# Patient Record
Sex: MALE | Race: BLACK OR AFRICAN AMERICAN | NOT HISPANIC OR LATINO | Employment: FULL TIME | RURAL
[De-identification: names, ages, dates, MRNs, and addresses within clinical notes are randomized per-mention and may not be internally consistent; named-entity substitution may affect disease eponyms.]

---

## 2021-11-12 ENCOUNTER — ANESTHESIA EVENT (OUTPATIENT)
Dept: SURGERY | Facility: HOSPITAL | Age: 46
DRG: 500 | End: 2021-11-12
Payer: COMMERCIAL

## 2021-11-12 ENCOUNTER — ANESTHESIA (OUTPATIENT)
Dept: SURGERY | Facility: HOSPITAL | Age: 46
DRG: 500 | End: 2021-11-12
Payer: COMMERCIAL

## 2021-11-12 ENCOUNTER — HOSPITAL ENCOUNTER (INPATIENT)
Facility: HOSPITAL | Age: 46
LOS: 28 days | Discharge: HOME HEALTH | DRG: 500 | End: 2021-12-10
Attending: INTERNAL MEDICINE | Admitting: INTERNAL MEDICINE
Payer: COMMERCIAL

## 2021-11-12 VITALS — RESPIRATION RATE: 2 BRPM

## 2021-11-12 DIAGNOSIS — R00.0 TACHYCARDIA: ICD-10-CM

## 2021-11-12 DIAGNOSIS — L02.416 ABSCESS OF LEG, LEFT: ICD-10-CM

## 2021-11-12 DIAGNOSIS — E11.10 DKA (DIABETIC KETOACIDOSIS): ICD-10-CM

## 2021-11-12 DIAGNOSIS — L97.509 ULCER OF TOE, UNSPECIFIED LATERALITY, UNSPECIFIED ULCER STAGE: ICD-10-CM

## 2021-11-12 DIAGNOSIS — J96.02 ACUTE RESPIRATORY FAILURE WITH HYPOXIA AND HYPERCARBIA: ICD-10-CM

## 2021-11-12 DIAGNOSIS — E11.22 TYPE 2 DIABETES MELLITUS WITH STAGE 3B CHRONIC KIDNEY DISEASE, WITHOUT LONG-TERM CURRENT USE OF INSULIN: Chronic | ICD-10-CM

## 2021-11-12 DIAGNOSIS — J96.01 ACUTE RESPIRATORY FAILURE WITH HYPOXIA AND HYPERCARBIA: ICD-10-CM

## 2021-11-12 DIAGNOSIS — E87.20 METABOLIC ACIDOSIS: ICD-10-CM

## 2021-11-12 DIAGNOSIS — L02.214 ABSCESS OF GROIN, LEFT: ICD-10-CM

## 2021-11-12 DIAGNOSIS — N17.9 ACUTE RENAL FAILURE SUPERIMPOSED ON STAGE 3A CHRONIC KIDNEY DISEASE, UNSPECIFIED ACUTE RENAL FAILURE TYPE: ICD-10-CM

## 2021-11-12 DIAGNOSIS — F05 ACUTE CONFUSIONAL STATE: ICD-10-CM

## 2021-11-12 DIAGNOSIS — I96 GANGRENE OF FOOT: ICD-10-CM

## 2021-11-12 DIAGNOSIS — E11.10 DIABETIC KETOACIDOSIS: ICD-10-CM

## 2021-11-12 DIAGNOSIS — N18.32 TYPE 2 DIABETES MELLITUS WITH STAGE 3B CHRONIC KIDNEY DISEASE, WITHOUT LONG-TERM CURRENT USE OF INSULIN: Chronic | ICD-10-CM

## 2021-11-12 DIAGNOSIS — N18.31 ACUTE RENAL FAILURE SUPERIMPOSED ON STAGE 3A CHRONIC KIDNEY DISEASE, UNSPECIFIED ACUTE RENAL FAILURE TYPE: ICD-10-CM

## 2021-11-12 DIAGNOSIS — Z93.3 S/P COLOSTOMY: ICD-10-CM

## 2021-11-12 DIAGNOSIS — M72.6 NECROTIZING FASCIITIS: Primary | ICD-10-CM

## 2021-11-12 DIAGNOSIS — D64.9 ANEMIA, UNSPECIFIED TYPE: ICD-10-CM

## 2021-11-12 DIAGNOSIS — I10 BENIGN ESSENTIAL HTN: ICD-10-CM

## 2021-11-12 DIAGNOSIS — J96.01 ACUTE HYPOXEMIC RESPIRATORY FAILURE: ICD-10-CM

## 2021-11-12 DIAGNOSIS — E87.6 HYPOKALEMIA: ICD-10-CM

## 2021-11-12 DIAGNOSIS — G47.33 OSA (OBSTRUCTIVE SLEEP APNEA): ICD-10-CM

## 2021-11-12 LAB
ANION GAP SERPL CALCULATED.3IONS-SCNC: 27 MMOL/L (ref 7–16)
BUN SERPL-MCNC: 20 MG/DL (ref 7–18)
BUN/CREAT SERPL: 11 (ref 6–20)
CALCIUM SERPL-MCNC: 9.8 MG/DL (ref 8.5–10.1)
CHLORIDE SERPL-SCNC: 96 MMOL/L (ref 98–107)
CO2 SERPL-SCNC: 10 MMOL/L (ref 21–32)
CREAT SERPL-MCNC: 1.81 MG/DL (ref 0.7–1.3)
GLUCOSE SERPL-MCNC: 200 MG/DL (ref 70–105)
GLUCOSE SERPL-MCNC: 249 MG/DL (ref 70–105)
GLUCOSE SERPL-MCNC: 258 MG/DL (ref 70–105)
GLUCOSE SERPL-MCNC: 261 MG/DL (ref 70–105)
GLUCOSE SERPL-MCNC: 406 MG/DL (ref 70–105)
GLUCOSE SERPL-MCNC: 406 MG/DL (ref 74–106)
PHOSPHATE SERPL-MCNC: 3.1 MG/DL (ref 2.5–4.5)
POTASSIUM SERPL-SCNC: 4.3 MMOL/L (ref 3.5–5.1)
SODIUM SERPL-SCNC: 129 MMOL/L (ref 136–145)

## 2021-11-12 PROCEDURE — D9220A PRA ANESTHESIA: ICD-10-PCS | Mod: CRNA,,, | Performed by: NURSE ANESTHETIST, CERTIFIED REGISTERED

## 2021-11-12 PROCEDURE — 88305 TISSUE EXAM BY PATHOLOGIST: CPT | Mod: SUR | Performed by: STUDENT IN AN ORGANIZED HEALTH CARE EDUCATION/TRAINING PROGRAM

## 2021-11-12 PROCEDURE — 88305 TISSUE EXAM BY PATHOLOGIST: CPT | Mod: 26,XU,, | Performed by: PATHOLOGY

## 2021-11-12 PROCEDURE — 97597 PR DEBRIDEMENT OPEN WOUND 20 SQ CM<: ICD-10-PCS | Mod: XU,,, | Performed by: STUDENT IN AN ORGANIZED HEALTH CARE EDUCATION/TRAINING PROGRAM

## 2021-11-12 PROCEDURE — 20000000 HC ICU ROOM

## 2021-11-12 PROCEDURE — 97597 DBRDMT OPN WND 1ST 20 CM/<: CPT | Mod: XU,,, | Performed by: STUDENT IN AN ORGANIZED HEALTH CARE EDUCATION/TRAINING PROGRAM

## 2021-11-12 PROCEDURE — 10140 I&D HMTMA SEROMA/FLUID COLLJ: CPT | Mod: 51,,, | Performed by: STUDENT IN AN ORGANIZED HEALTH CARE EDUCATION/TRAINING PROGRAM

## 2021-11-12 PROCEDURE — 87040 BLOOD CULTURE FOR BACTERIA: CPT | Performed by: INTERNAL MEDICINE

## 2021-11-12 PROCEDURE — 88311 DECALCIFY TISSUE: CPT | Mod: 26,,, | Performed by: PATHOLOGY

## 2021-11-12 PROCEDURE — 63600175 PHARM REV CODE 636 W HCPCS: Performed by: NURSE ANESTHETIST, CERTIFIED REGISTERED

## 2021-11-12 PROCEDURE — 10140 PR DRAINAGE OF HEMATOMA/FLUID: ICD-10-PCS | Mod: 51,,, | Performed by: STUDENT IN AN ORGANIZED HEALTH CARE EDUCATION/TRAINING PROGRAM

## 2021-11-12 PROCEDURE — D9220A PRA ANESTHESIA: Mod: CRNA,,, | Performed by: NURSE ANESTHETIST, CERTIFIED REGISTERED

## 2021-11-12 PROCEDURE — 27000716 HC OXISENSOR PROBE, ANY SIZE: Performed by: ANESTHESIOLOGY

## 2021-11-12 PROCEDURE — 25000003 PHARM REV CODE 250: Performed by: STUDENT IN AN ORGANIZED HEALTH CARE EDUCATION/TRAINING PROGRAM

## 2021-11-12 PROCEDURE — 36000706: Performed by: STUDENT IN AN ORGANIZED HEALTH CARE EDUCATION/TRAINING PROGRAM

## 2021-11-12 PROCEDURE — 87075 CULTR BACTERIA EXCEPT BLOOD: CPT | Performed by: STUDENT IN AN ORGANIZED HEALTH CARE EDUCATION/TRAINING PROGRAM

## 2021-11-12 PROCEDURE — 80048 BASIC METABOLIC PNL TOTAL CA: CPT | Performed by: INTERNAL MEDICINE

## 2021-11-12 PROCEDURE — 63600175 PHARM REV CODE 636 W HCPCS: Performed by: INTERNAL MEDICINE

## 2021-11-12 PROCEDURE — 82962 GLUCOSE BLOOD TEST: CPT

## 2021-11-12 PROCEDURE — 87070 CULTURE OTHR SPECIMN AEROBIC: CPT | Performed by: STUDENT IN AN ORGANIZED HEALTH CARE EDUCATION/TRAINING PROGRAM

## 2021-11-12 PROCEDURE — 27000689 HC BLADE LARYNGOSCOPE ANY SIZE: Performed by: ANESTHESIOLOGY

## 2021-11-12 PROCEDURE — 99223 1ST HOSP IP/OBS HIGH 75: CPT | Mod: ,,, | Performed by: INTERNAL MEDICINE

## 2021-11-12 PROCEDURE — 93010 EKG 12-LEAD: ICD-10-PCS | Mod: ,,, | Performed by: INTERNAL MEDICINE

## 2021-11-12 PROCEDURE — P9045 ALBUMIN (HUMAN), 5%, 250 ML: HCPCS | Performed by: NURSE ANESTHETIST, CERTIFIED REGISTERED

## 2021-11-12 PROCEDURE — 11006 DBRDMT SKIN XTRNL GENT PER: CPT | Mod: ,,, | Performed by: STUDENT IN AN ORGANIZED HEALTH CARE EDUCATION/TRAINING PROGRAM

## 2021-11-12 PROCEDURE — 37000008 HC ANESTHESIA 1ST 15 MINUTES: Performed by: STUDENT IN AN ORGANIZED HEALTH CARE EDUCATION/TRAINING PROGRAM

## 2021-11-12 PROCEDURE — 25000003 PHARM REV CODE 250: Performed by: INTERNAL MEDICINE

## 2021-11-12 PROCEDURE — 27201423 OPTIME MED/SURG SUP & DEVICES STERILE SUPPLY: Performed by: STUDENT IN AN ORGANIZED HEALTH CARE EDUCATION/TRAINING PROGRAM

## 2021-11-12 PROCEDURE — 28820 AMPUTATION OF TOE: CPT | Mod: T1,51,, | Performed by: STUDENT IN AN ORGANIZED HEALTH CARE EDUCATION/TRAINING PROGRAM

## 2021-11-12 PROCEDURE — 27000510 HC BLANKET BAIR HUGGER ANY SIZE: Performed by: ANESTHESIOLOGY

## 2021-11-12 PROCEDURE — 27000165 HC TUBE, ETT CUFFED: Performed by: ANESTHESIOLOGY

## 2021-11-12 PROCEDURE — 36415 COLL VENOUS BLD VENIPUNCTURE: CPT | Performed by: INTERNAL MEDICINE

## 2021-11-12 PROCEDURE — 37000009 HC ANESTHESIA EA ADD 15 MINS: Performed by: STUDENT IN AN ORGANIZED HEALTH CARE EDUCATION/TRAINING PROGRAM

## 2021-11-12 PROCEDURE — 84100 ASSAY OF PHOSPHORUS: CPT | Performed by: INTERNAL MEDICINE

## 2021-11-12 PROCEDURE — 93010 ELECTROCARDIOGRAM REPORT: CPT | Mod: ,,, | Performed by: INTERNAL MEDICINE

## 2021-11-12 PROCEDURE — 88305 TISSUE EXAM BY PATHOLOGIST: CPT | Mod: 26,,, | Performed by: PATHOLOGY

## 2021-11-12 PROCEDURE — D9220A PRA ANESTHESIA: ICD-10-PCS | Mod: ANES,,, | Performed by: ANESTHESIOLOGY

## 2021-11-12 PROCEDURE — 88305 TISSUE EXAM BY PATHOLOGIST: ICD-10-PCS | Mod: 26,XU,, | Performed by: PATHOLOGY

## 2021-11-12 PROCEDURE — 80048 BASIC METABOLIC PNL TOTAL CA: CPT | Performed by: STUDENT IN AN ORGANIZED HEALTH CARE EDUCATION/TRAINING PROGRAM

## 2021-11-12 PROCEDURE — 25000003 PHARM REV CODE 250: Performed by: NURSE ANESTHETIST, CERTIFIED REGISTERED

## 2021-11-12 PROCEDURE — 28820 PR AMPUTATION TOE,MT-P JT: ICD-10-PCS | Mod: T1,51,, | Performed by: STUDENT IN AN ORGANIZED HEALTH CARE EDUCATION/TRAINING PROGRAM

## 2021-11-12 PROCEDURE — 93005 ELECTROCARDIOGRAM TRACING: CPT

## 2021-11-12 PROCEDURE — 84100 ASSAY OF PHOSPHORUS: CPT | Performed by: STUDENT IN AN ORGANIZED HEALTH CARE EDUCATION/TRAINING PROGRAM

## 2021-11-12 PROCEDURE — D9220A PRA ANESTHESIA: Mod: ANES,,, | Performed by: ANESTHESIOLOGY

## 2021-11-12 PROCEDURE — 88311 SURGICAL PATHOLOGY: ICD-10-PCS | Mod: 26,,, | Performed by: PATHOLOGY

## 2021-11-12 PROCEDURE — 99223 PR INITIAL HOSPITAL CARE,LEVL III: ICD-10-PCS | Mod: ,,, | Performed by: INTERNAL MEDICINE

## 2021-11-12 PROCEDURE — 36000707: Performed by: STUDENT IN AN ORGANIZED HEALTH CARE EDUCATION/TRAINING PROGRAM

## 2021-11-12 PROCEDURE — 63600175 PHARM REV CODE 636 W HCPCS: Performed by: STUDENT IN AN ORGANIZED HEALTH CARE EDUCATION/TRAINING PROGRAM

## 2021-11-12 PROCEDURE — 27000260 *HC AIRWAY ORAL: Performed by: ANESTHESIOLOGY

## 2021-11-12 PROCEDURE — 11006 PR DEBRIDE NECROT SKIN/ TISS, GENIT,PERI, ADB WALL: ICD-10-PCS | Mod: ,,, | Performed by: STUDENT IN AN ORGANIZED HEALTH CARE EDUCATION/TRAINING PROGRAM

## 2021-11-12 RX ORDER — SODIUM CHLORIDE 9 MG/ML
INJECTION, SOLUTION INTRAVENOUS CONTINUOUS
Status: CANCELLED | OUTPATIENT
Start: 2021-11-12

## 2021-11-12 RX ORDER — PROPOFOL 10 MG/ML
VIAL (ML) INTRAVENOUS
Status: DISCONTINUED | OUTPATIENT
Start: 2021-11-12 | End: 2021-11-12

## 2021-11-12 RX ORDER — HYDROMORPHONE HYDROCHLORIDE 2 MG/ML
1 INJECTION, SOLUTION INTRAMUSCULAR; INTRAVENOUS; SUBCUTANEOUS
Status: DISCONTINUED | OUTPATIENT
Start: 2021-11-12 | End: 2021-11-15

## 2021-11-12 RX ORDER — ENOXAPARIN SODIUM 100 MG/ML
40 INJECTION SUBCUTANEOUS EVERY 24 HOURS
Status: DISCONTINUED | OUTPATIENT
Start: 2021-11-12 | End: 2021-11-17 | Stop reason: SDUPTHER

## 2021-11-12 RX ORDER — FENTANYL CITRATE 50 UG/ML
INJECTION, SOLUTION INTRAMUSCULAR; INTRAVENOUS
Status: DISCONTINUED | OUTPATIENT
Start: 2021-11-12 | End: 2021-11-12

## 2021-11-12 RX ORDER — SODIUM CHLORIDE 9 MG/ML
INJECTION, SOLUTION INTRAVENOUS CONTINUOUS
Status: DISCONTINUED | OUTPATIENT
Start: 2021-11-12 | End: 2021-11-15

## 2021-11-12 RX ORDER — OXYCODONE AND ACETAMINOPHEN 10; 325 MG/1; MG/1
1 TABLET ORAL EVERY 6 HOURS PRN
Status: DISCONTINUED | OUTPATIENT
Start: 2021-11-12 | End: 2021-11-15

## 2021-11-12 RX ORDER — ALBUMIN HUMAN 50 G/1000ML
SOLUTION INTRAVENOUS CONTINUOUS PRN
Status: DISCONTINUED | OUTPATIENT
Start: 2021-11-12 | End: 2021-11-12

## 2021-11-12 RX ORDER — SODIUM CHLORIDE 0.9 % (FLUSH) 0.9 %
10 SYRINGE (ML) INJECTION
Status: DISCONTINUED | OUTPATIENT
Start: 2021-11-12 | End: 2021-12-10 | Stop reason: HOSPADM

## 2021-11-12 RX ORDER — ROCURONIUM BROMIDE 10 MG/ML
INJECTION, SOLUTION INTRAVENOUS
Status: DISCONTINUED | OUTPATIENT
Start: 2021-11-12 | End: 2021-11-12

## 2021-11-12 RX ORDER — LIDOCAINE HYDROCHLORIDE 20 MG/ML
INJECTION, SOLUTION EPIDURAL; INFILTRATION; INTRACAUDAL; PERINEURAL
Status: DISCONTINUED | OUTPATIENT
Start: 2021-11-12 | End: 2021-11-12

## 2021-11-12 RX ORDER — ONDANSETRON 2 MG/ML
INJECTION INTRAMUSCULAR; INTRAVENOUS
Status: DISCONTINUED | OUTPATIENT
Start: 2021-11-12 | End: 2021-11-12

## 2021-11-12 RX ORDER — MIDAZOLAM HYDROCHLORIDE 1 MG/ML
INJECTION INTRAMUSCULAR; INTRAVENOUS
Status: DISCONTINUED | OUTPATIENT
Start: 2021-11-12 | End: 2021-11-12

## 2021-11-12 RX ORDER — CLINDAMYCIN PHOSPHATE 900 MG/50ML
900 INJECTION, SOLUTION INTRAVENOUS
Status: DISCONTINUED | OUTPATIENT
Start: 2021-11-12 | End: 2021-11-22

## 2021-11-12 RX ADMIN — ALBUMIN (HUMAN): 12.5 INJECTION, SOLUTION INTRAVENOUS at 07:11

## 2021-11-12 RX ADMIN — FENTANYL CITRATE 25 MCG: 50 INJECTION INTRAMUSCULAR; INTRAVENOUS at 07:11

## 2021-11-12 RX ADMIN — VANCOMYCIN HYDROCHLORIDE 2250 MG: 5 INJECTION, POWDER, LYOPHILIZED, FOR SOLUTION INTRAVENOUS at 09:11

## 2021-11-12 RX ADMIN — ROCURONIUM BROMIDE 20 MG: 10 INJECTION, SOLUTION INTRAVENOUS at 06:11

## 2021-11-12 RX ADMIN — SODIUM CHLORIDE: 9 INJECTION, SOLUTION INTRAVENOUS at 11:11

## 2021-11-12 RX ADMIN — HUMAN INSULIN 20 UNITS: 100 INJECTION, SOLUTION SUBCUTANEOUS at 05:11

## 2021-11-12 RX ADMIN — MIDAZOLAM HYDROCHLORIDE 2 MG: 1 INJECTION, SOLUTION INTRAMUSCULAR; INTRAVENOUS at 05:11

## 2021-11-12 RX ADMIN — FENTANYL CITRATE 25 MCG: 50 INJECTION INTRAMUSCULAR; INTRAVENOUS at 06:11

## 2021-11-12 RX ADMIN — LIDOCAINE HYDROCHLORIDE 80 MG: 20 INJECTION, SOLUTION INTRAVENOUS at 05:11

## 2021-11-12 RX ADMIN — FENTANYL CITRATE 50 MCG: 50 INJECTION INTRAMUSCULAR; INTRAVENOUS at 05:11

## 2021-11-12 RX ADMIN — SODIUM CHLORIDE: 9 INJECTION, SOLUTION INTRAVENOUS at 06:11

## 2021-11-12 RX ADMIN — SODIUM CHLORIDE: 9 INJECTION, SOLUTION INTRAVENOUS at 05:11

## 2021-11-12 RX ADMIN — SODIUM CHLORIDE 3 UNITS/HR: 9 INJECTION, SOLUTION INTRAVENOUS at 08:11

## 2021-11-12 RX ADMIN — HYDROMORPHONE HYDROCHLORIDE 1 MG: 2 INJECTION INTRAMUSCULAR; INTRAVENOUS; SUBCUTANEOUS at 09:11

## 2021-11-12 RX ADMIN — PIPERACILLIN AND TAZOBACTAM 4.5 G: 4; .5 INJECTION, POWDER, FOR SOLUTION INTRAVENOUS at 06:11

## 2021-11-12 RX ADMIN — PROPOFOL 170 MG: 10 INJECTION, EMULSION INTRAVENOUS at 05:11

## 2021-11-12 RX ADMIN — ROCURONIUM BROMIDE 50 MG: 10 INJECTION, SOLUTION INTRAVENOUS at 05:11

## 2021-11-12 RX ADMIN — CLINDAMYCIN IN 5 PERCENT DEXTROSE 900 MG: 18 INJECTION, SOLUTION INTRAVENOUS at 11:11

## 2021-11-12 RX ADMIN — FENTANYL CITRATE 50 MCG: 50 INJECTION INTRAMUSCULAR; INTRAVENOUS at 06:11

## 2021-11-12 RX ADMIN — SUGAMMADEX 200 MG: 100 INJECTION, SOLUTION INTRAVENOUS at 07:11

## 2021-11-12 RX ADMIN — ONDANSETRON 8 MG: 2 INJECTION INTRAMUSCULAR; INTRAVENOUS at 05:11

## 2021-11-12 RX ADMIN — HUMAN INSULIN 20 UNITS: 100 INJECTION, SOLUTION SUBCUTANEOUS at 06:11

## 2021-11-13 PROBLEM — M72.6 NECROTIZING FASCIITIS: Status: ACTIVE | Noted: 2021-11-12

## 2021-11-13 LAB
ANION GAP SERPL CALCULATED.3IONS-SCNC: 13 MMOL/L (ref 7–16)
ANION GAP SERPL CALCULATED.3IONS-SCNC: 18 MMOL/L (ref 7–16)
ANION GAP SERPL CALCULATED.3IONS-SCNC: 18 MMOL/L (ref 7–16)
ANION GAP SERPL CALCULATED.3IONS-SCNC: 20 MMOL/L (ref 7–16)
ANION GAP SERPL CALCULATED.3IONS-SCNC: 21 MMOL/L (ref 7–16)
ANION GAP SERPL CALCULATED.3IONS-SCNC: 23 MMOL/L (ref 7–16)
BUN SERPL-MCNC: 21 MG/DL (ref 7–18)
BUN SERPL-MCNC: 22 MG/DL (ref 7–18)
BUN SERPL-MCNC: 23 MG/DL (ref 7–18)
BUN SERPL-MCNC: 24 MG/DL (ref 7–18)
BUN SERPL-MCNC: 25 MG/DL (ref 7–18)
BUN SERPL-MCNC: <1 MG/DL (ref 7–18)
BUN/CREAT SERPL: 10 (ref 6–20)
BUN/CREAT SERPL: 10 (ref 6–20)
BUN/CREAT SERPL: 7 (ref 6–20)
BUN/CREAT SERPL: 8 (ref 6–20)
BUN/CREAT SERPL: 8 (ref 6–20)
BUN/CREAT SERPL: <0 (ref 6–20)
CALCIUM SERPL-MCNC: 8.2 MG/DL (ref 8.5–10.1)
CALCIUM SERPL-MCNC: 8.3 MG/DL (ref 8.5–10.1)
CALCIUM SERPL-MCNC: 8.4 MG/DL (ref 8.5–10.1)
CALCIUM SERPL-MCNC: 8.5 MG/DL (ref 8.5–10.1)
CALCIUM SERPL-MCNC: 8.6 MG/DL (ref 8.5–10.1)
CALCIUM SERPL-MCNC: 8.8 MG/DL (ref 8.5–10.1)
CHLORIDE SERPL-SCNC: 100 MMOL/L (ref 98–107)
CHLORIDE SERPL-SCNC: 100 MMOL/L (ref 98–107)
CHLORIDE SERPL-SCNC: 101 MMOL/L (ref 98–107)
CHLORIDE SERPL-SCNC: 96 MMOL/L (ref 98–107)
CHLORIDE SERPL-SCNC: 99 MMOL/L (ref 98–107)
CHLORIDE SERPL-SCNC: 99 MMOL/L (ref 98–107)
CO2 SERPL-SCNC: 12 MMOL/L (ref 21–32)
CO2 SERPL-SCNC: 13 MMOL/L (ref 21–32)
CO2 SERPL-SCNC: 14 MMOL/L (ref 21–32)
CO2 SERPL-SCNC: 16 MMOL/L (ref 21–32)
CO2 SERPL-SCNC: 16 MMOL/L (ref 21–32)
CO2 SERPL-SCNC: 19 MMOL/L (ref 21–32)
CREAT SERPL-MCNC: 2.07 MG/DL (ref 0.7–1.3)
CREAT SERPL-MCNC: 2.33 MG/DL (ref 0.7–1.3)
CREAT SERPL-MCNC: 2.6 MG/DL (ref 0.7–1.3)
CREAT SERPL-MCNC: 2.95 MG/DL (ref 0.7–1.3)
CREAT SERPL-MCNC: 3.24 MG/DL (ref 0.7–1.3)
CREAT SERPL-MCNC: 3.5 MG/DL (ref 0.7–1.3)
GLUCOSE SERPL-MCNC: 201 MG/DL (ref 70–105)
GLUCOSE SERPL-MCNC: 205 MG/DL (ref 70–105)
GLUCOSE SERPL-MCNC: 214 MG/DL (ref 70–105)
GLUCOSE SERPL-MCNC: 217 MG/DL (ref 74–106)
GLUCOSE SERPL-MCNC: 226 MG/DL (ref 70–105)
GLUCOSE SERPL-MCNC: 230 MG/DL (ref 70–105)
GLUCOSE SERPL-MCNC: 231 MG/DL (ref 74–106)
GLUCOSE SERPL-MCNC: 232 MG/DL (ref 74–106)
GLUCOSE SERPL-MCNC: 235 MG/DL (ref 70–105)
GLUCOSE SERPL-MCNC: 238 MG/DL (ref 70–105)
GLUCOSE SERPL-MCNC: 238 MG/DL (ref 70–105)
GLUCOSE SERPL-MCNC: 240 MG/DL (ref 70–105)
GLUCOSE SERPL-MCNC: 241 MG/DL (ref 70–105)
GLUCOSE SERPL-MCNC: 242 MG/DL (ref 70–105)
GLUCOSE SERPL-MCNC: 247 MG/DL (ref 70–105)
GLUCOSE SERPL-MCNC: 250 MG/DL (ref 70–105)
GLUCOSE SERPL-MCNC: 255 MG/DL (ref 74–106)
GLUCOSE SERPL-MCNC: 264 MG/DL (ref 70–105)
GLUCOSE SERPL-MCNC: 264 MG/DL (ref 70–105)
GLUCOSE SERPL-MCNC: 265 MG/DL (ref 70–105)
GLUCOSE SERPL-MCNC: 265 MG/DL (ref 74–106)
GLUCOSE SERPL-MCNC: 266 MG/DL (ref 74–106)
GLUCOSE SERPL-MCNC: 268 MG/DL (ref 70–105)
GLUCOSE SERPL-MCNC: 275 MG/DL (ref 70–105)
GLUCOSE SERPL-MCNC: 282 MG/DL (ref 70–105)
GLUCOSE SERPL-MCNC: 299 MG/DL (ref 70–105)
PHOSPHATE SERPL-MCNC: 1.9 MG/DL (ref 2.5–4.5)
PHOSPHATE SERPL-MCNC: 2 MG/DL (ref 2.5–4.5)
PHOSPHATE SERPL-MCNC: 2 MG/DL (ref 2.5–4.5)
PHOSPHATE SERPL-MCNC: 2.1 MG/DL (ref 2.5–4.5)
PHOSPHATE SERPL-MCNC: 2.3 MG/DL (ref 2.5–4.5)
PHOSPHATE SERPL-MCNC: 2.4 MG/DL (ref 2.5–4.5)
POTASSIUM SERPL-SCNC: 3.2 MMOL/L (ref 3.5–5.1)
POTASSIUM SERPL-SCNC: 3.4 MMOL/L (ref 3.5–5.1)
POTASSIUM SERPL-SCNC: 3.6 MMOL/L (ref 3.5–5.1)
POTASSIUM SERPL-SCNC: 3.7 MMOL/L (ref 3.5–5.1)
POTASSIUM SERPL-SCNC: 3.8 MMOL/L (ref 3.5–5.1)
POTASSIUM SERPL-SCNC: 4.3 MMOL/L (ref 3.5–5.1)
SODIUM SERPL-SCNC: 125 MMOL/L (ref 136–145)
SODIUM SERPL-SCNC: 130 MMOL/L (ref 136–145)
SODIUM SERPL-SCNC: 131 MMOL/L (ref 136–145)

## 2021-11-13 PROCEDURE — 82962 GLUCOSE BLOOD TEST: CPT

## 2021-11-13 PROCEDURE — 25000003 PHARM REV CODE 250: Performed by: STUDENT IN AN ORGANIZED HEALTH CARE EDUCATION/TRAINING PROGRAM

## 2021-11-13 PROCEDURE — 99233 PR SUBSEQUENT HOSPITAL CARE,LEVL III: ICD-10-PCS | Mod: ,,, | Performed by: INTERNAL MEDICINE

## 2021-11-13 PROCEDURE — 80048 BASIC METABOLIC PNL TOTAL CA: CPT | Performed by: STUDENT IN AN ORGANIZED HEALTH CARE EDUCATION/TRAINING PROGRAM

## 2021-11-13 PROCEDURE — 36415 COLL VENOUS BLD VENIPUNCTURE: CPT | Performed by: STUDENT IN AN ORGANIZED HEALTH CARE EDUCATION/TRAINING PROGRAM

## 2021-11-13 PROCEDURE — 63600175 PHARM REV CODE 636 W HCPCS: Performed by: INTERNAL MEDICINE

## 2021-11-13 PROCEDURE — 99233 SBSQ HOSP IP/OBS HIGH 50: CPT | Mod: ,,, | Performed by: INTERNAL MEDICINE

## 2021-11-13 PROCEDURE — 84100 ASSAY OF PHOSPHORUS: CPT | Performed by: STUDENT IN AN ORGANIZED HEALTH CARE EDUCATION/TRAINING PROGRAM

## 2021-11-13 PROCEDURE — 63600175 PHARM REV CODE 636 W HCPCS: Performed by: STUDENT IN AN ORGANIZED HEALTH CARE EDUCATION/TRAINING PROGRAM

## 2021-11-13 PROCEDURE — 20000000 HC ICU ROOM

## 2021-11-13 RX ORDER — POTASSIUM CHLORIDE 7.45 MG/ML
40 INJECTION INTRAVENOUS ONCE
Status: COMPLETED | OUTPATIENT
Start: 2021-11-13 | End: 2021-11-14

## 2021-11-13 RX ORDER — ONDANSETRON 2 MG/ML
4 INJECTION INTRAMUSCULAR; INTRAVENOUS EVERY 6 HOURS PRN
Status: DISCONTINUED | OUTPATIENT
Start: 2021-11-13 | End: 2021-11-17

## 2021-11-13 RX ORDER — PROCHLORPERAZINE EDISYLATE 5 MG/ML
5 INJECTION INTRAMUSCULAR; INTRAVENOUS EVERY 6 HOURS PRN
Status: DISCONTINUED | OUTPATIENT
Start: 2021-11-13 | End: 2021-12-10 | Stop reason: HOSPADM

## 2021-11-13 RX ADMIN — ENOXAPARIN SODIUM 40 MG: 40 INJECTION SUBCUTANEOUS at 05:11

## 2021-11-13 RX ADMIN — PIPERACILLIN AND TAZOBACTAM 4.5 G: 4; .5 INJECTION, POWDER, FOR SOLUTION INTRAVENOUS at 05:11

## 2021-11-13 RX ADMIN — ONDANSETRON 4 MG: 2 INJECTION INTRAMUSCULAR; INTRAVENOUS at 09:11

## 2021-11-13 RX ADMIN — POTASSIUM CHLORIDE 40 MEQ: 7.46 INJECTION, SOLUTION INTRAVENOUS at 08:11

## 2021-11-13 RX ADMIN — SODIUM CHLORIDE: 9 INJECTION, SOLUTION INTRAVENOUS at 06:11

## 2021-11-13 RX ADMIN — HYDROMORPHONE HYDROCHLORIDE 1 MG: 2 INJECTION INTRAMUSCULAR; INTRAVENOUS; SUBCUTANEOUS at 01:11

## 2021-11-13 RX ADMIN — PIPERACILLIN AND TAZOBACTAM 4.5 G: 4; .5 INJECTION, POWDER, FOR SOLUTION INTRAVENOUS at 09:11

## 2021-11-13 RX ADMIN — HYDROMORPHONE HYDROCHLORIDE 1 MG: 2 INJECTION INTRAMUSCULAR; INTRAVENOUS; SUBCUTANEOUS at 09:11

## 2021-11-13 RX ADMIN — PIPERACILLIN AND TAZOBACTAM 4.5 G: 4; .5 INJECTION, POWDER, FOR SOLUTION INTRAVENOUS at 02:11

## 2021-11-13 RX ADMIN — OXYCODONE HYDROCHLORIDE AND ACETAMINOPHEN 1 TABLET: 10; 325 TABLET ORAL at 03:11

## 2021-11-13 RX ADMIN — HYDROMORPHONE HYDROCHLORIDE 1 MG: 2 INJECTION INTRAMUSCULAR; INTRAVENOUS; SUBCUTANEOUS at 03:11

## 2021-11-13 RX ADMIN — CLINDAMYCIN IN 5 PERCENT DEXTROSE 900 MG: 18 INJECTION, SOLUTION INTRAVENOUS at 06:11

## 2021-11-13 RX ADMIN — HYDROMORPHONE HYDROCHLORIDE 1 MG: 2 INJECTION INTRAMUSCULAR; INTRAVENOUS; SUBCUTANEOUS at 07:11

## 2021-11-13 RX ADMIN — SODIUM CHLORIDE 3.95 UNITS/HR: 9 INJECTION, SOLUTION INTRAVENOUS at 01:11

## 2021-11-13 RX ADMIN — SODIUM CHLORIDE: 9 INJECTION, SOLUTION INTRAVENOUS at 01:11

## 2021-11-13 RX ADMIN — SODIUM CHLORIDE: 9 INJECTION, SOLUTION INTRAVENOUS at 08:11

## 2021-11-13 RX ADMIN — CLINDAMYCIN IN 5 PERCENT DEXTROSE 900 MG: 18 INJECTION, SOLUTION INTRAVENOUS at 03:11

## 2021-11-13 RX ADMIN — VANCOMYCIN HYDROCHLORIDE 2250 MG: 5 INJECTION, POWDER, LYOPHILIZED, FOR SOLUTION INTRAVENOUS at 09:11

## 2021-11-13 RX ADMIN — CLINDAMYCIN IN 5 PERCENT DEXTROSE 900 MG: 18 INJECTION, SOLUTION INTRAVENOUS at 11:11

## 2021-11-14 ENCOUNTER — ANESTHESIA (OUTPATIENT)
Dept: SURGERY | Facility: HOSPITAL | Age: 46
DRG: 500 | End: 2021-11-14
Payer: COMMERCIAL

## 2021-11-14 ENCOUNTER — ANESTHESIA EVENT (OUTPATIENT)
Dept: SURGERY | Facility: HOSPITAL | Age: 46
DRG: 500 | End: 2021-11-14
Payer: COMMERCIAL

## 2021-11-14 LAB
ANION GAP SERPL CALCULATED.3IONS-SCNC: 11 MMOL/L (ref 7–16)
ANION GAP SERPL CALCULATED.3IONS-SCNC: 15 MMOL/L (ref 7–16)
ANION GAP SERPL CALCULATED.3IONS-SCNC: 16 MMOL/L (ref 7–16)
ANION GAP SERPL CALCULATED.3IONS-SCNC: 16 MMOL/L (ref 7–16)
ANION GAP SERPL CALCULATED.3IONS-SCNC: 21 MMOL/L (ref 7–16)
BASOPHILS # BLD AUTO: 0.05 K/UL (ref 0–0.2)
BASOPHILS NFR BLD AUTO: 0.3 % (ref 0–1)
BUN SERPL-MCNC: 27 MG/DL (ref 7–18)
BUN SERPL-MCNC: 29 MG/DL (ref 7–18)
BUN SERPL-MCNC: 31 MG/DL (ref 7–18)
BUN SERPL-MCNC: 33 MG/DL (ref 7–18)
BUN SERPL-MCNC: 37 MG/DL (ref 7–18)
BUN/CREAT SERPL: 17 (ref 6–20)
BUN/CREAT SERPL: 8 (ref 6–20)
BUN/CREAT SERPL: 9 (ref 6–20)
CALCIUM SERPL-MCNC: 8 MG/DL (ref 8.5–10.1)
CALCIUM SERPL-MCNC: 8.2 MG/DL (ref 8.5–10.1)
CALCIUM SERPL-MCNC: 8.5 MG/DL (ref 8.5–10.1)
CALCIUM SERPL-MCNC: 8.6 MG/DL (ref 8.5–10.1)
CALCIUM SERPL-MCNC: 8.7 MG/DL (ref 8.5–10.1)
CHLORIDE SERPL-SCNC: 102 MMOL/L (ref 98–107)
CHLORIDE SERPL-SCNC: 104 MMOL/L (ref 98–107)
CHLORIDE SERPL-SCNC: 107 MMOL/L (ref 98–107)
CHLORIDE SERPL-SCNC: 108 MMOL/L (ref 98–107)
CHLORIDE SERPL-SCNC: 96 MMOL/L (ref 98–107)
CO2 SERPL-SCNC: 14 MMOL/L (ref 21–32)
CO2 SERPL-SCNC: 15 MMOL/L (ref 21–32)
CO2 SERPL-SCNC: 16 MMOL/L (ref 21–32)
CO2 SERPL-SCNC: 19 MMOL/L (ref 21–32)
CO2 SERPL-SCNC: 24 MMOL/L (ref 21–32)
CREAT SERPL-MCNC: 2.14 MG/DL (ref 0.7–1.3)
CREAT SERPL-MCNC: 3.53 MG/DL (ref 0.7–1.3)
CREAT SERPL-MCNC: 3.6 MG/DL (ref 0.7–1.3)
CREAT SERPL-MCNC: 3.69 MG/DL (ref 0.7–1.3)
CREAT SERPL-MCNC: 3.78 MG/DL (ref 0.7–1.3)
DIFFERENTIAL METHOD BLD: ABNORMAL
EOSINOPHIL # BLD AUTO: 0.11 K/UL (ref 0–0.5)
EOSINOPHIL NFR BLD AUTO: 0.6 % (ref 1–4)
ERYTHROCYTE [DISTWIDTH] IN BLOOD BY AUTOMATED COUNT: 13.2 % (ref 11.5–14.5)
GLUCOSE SERPL-MCNC: 198 MG/DL (ref 74–106)
GLUCOSE SERPL-MCNC: 202 MG/DL (ref 70–105)
GLUCOSE SERPL-MCNC: 209 MG/DL (ref 70–105)
GLUCOSE SERPL-MCNC: 211 MG/DL (ref 74–106)
GLUCOSE SERPL-MCNC: 213 MG/DL (ref 70–105)
GLUCOSE SERPL-MCNC: 221 MG/DL (ref 70–105)
GLUCOSE SERPL-MCNC: 233 MG/DL (ref 70–105)
GLUCOSE SERPL-MCNC: 236 MG/DL (ref 70–105)
GLUCOSE SERPL-MCNC: 238 MG/DL (ref 74–106)
GLUCOSE SERPL-MCNC: 243 MG/DL (ref 70–105)
GLUCOSE SERPL-MCNC: 244 MG/DL (ref 74–106)
GLUCOSE SERPL-MCNC: 246 MG/DL (ref 70–105)
GLUCOSE SERPL-MCNC: 250 MG/DL (ref 70–105)
GLUCOSE SERPL-MCNC: 253 MG/DL (ref 70–105)
GLUCOSE SERPL-MCNC: 256 MG/DL (ref 70–105)
GLUCOSE SERPL-MCNC: 259 MG/DL (ref 70–105)
GLUCOSE SERPL-MCNC: 262 MG/DL (ref 70–105)
GLUCOSE SERPL-MCNC: 263 MG/DL (ref 70–105)
GLUCOSE SERPL-MCNC: 265 MG/DL (ref 70–105)
GLUCOSE SERPL-MCNC: 267 MG/DL (ref 70–105)
GLUCOSE SERPL-MCNC: 272 MG/DL (ref 70–105)
GLUCOSE SERPL-MCNC: 276 MG/DL (ref 70–105)
GLUCOSE SERPL-MCNC: 286 MG/DL (ref 70–105)
GLUCOSE SERPL-MCNC: 99 MG/DL (ref 74–106)
HCT VFR BLD AUTO: 30.2 % (ref 40–54)
HGB BLD-MCNC: 10.2 G/DL (ref 13.5–18)
IMM GRANULOCYTES # BLD AUTO: 0.17 K/UL (ref 0–0.04)
IMM GRANULOCYTES NFR BLD: 0.9 % (ref 0–0.4)
LYMPHOCYTES # BLD AUTO: 0.95 K/UL (ref 1–4.8)
LYMPHOCYTES NFR BLD AUTO: 4.8 % (ref 27–41)
MCH RBC QN AUTO: 30.4 PG (ref 27–31)
MCHC RBC AUTO-ENTMCNC: 33.8 G/DL (ref 32–36)
MCV RBC AUTO: 90.1 FL (ref 80–96)
MONOCYTES # BLD AUTO: 0.88 K/UL (ref 0–0.8)
MONOCYTES NFR BLD AUTO: 4.4 % (ref 2–6)
MPC BLD CALC-MCNC: 9.9 FL (ref 9.4–12.4)
NEUTROPHILS # BLD AUTO: 17.62 K/UL (ref 1.8–7.7)
NEUTROPHILS NFR BLD AUTO: 89 % (ref 53–65)
NRBC # BLD AUTO: 0 X10E3/UL
NRBC, AUTO (.00): 0 %
PHOSPHATE SERPL-MCNC: 2 MG/DL (ref 2.5–4.5)
PHOSPHATE SERPL-MCNC: 2.1 MG/DL (ref 2.5–4.5)
PHOSPHATE SERPL-MCNC: 2.7 MG/DL (ref 2.5–4.5)
PHOSPHATE SERPL-MCNC: 2.8 MG/DL (ref 2.5–4.5)
PHOSPHATE SERPL-MCNC: 3.6 MG/DL (ref 2.5–4.5)
PLATELET # BLD AUTO: 314 K/UL (ref 150–400)
POTASSIUM SERPL-SCNC: 3.3 MMOL/L (ref 3.5–5.1)
POTASSIUM SERPL-SCNC: 3.4 MMOL/L (ref 3.5–5.1)
POTASSIUM SERPL-SCNC: 3.5 MMOL/L (ref 3.5–5.1)
POTASSIUM SERPL-SCNC: 3.6 MMOL/L (ref 3.5–5.1)
POTASSIUM SERPL-SCNC: 3.8 MMOL/L (ref 3.5–5.1)
RBC # BLD AUTO: 3.35 M/UL (ref 4.6–6.2)
SODIUM SERPL-SCNC: 128 MMOL/L (ref 136–145)
SODIUM SERPL-SCNC: 134 MMOL/L (ref 136–145)
SODIUM SERPL-SCNC: 134 MMOL/L (ref 136–145)
SODIUM SERPL-SCNC: 135 MMOL/L (ref 136–145)
SODIUM SERPL-SCNC: 135 MMOL/L (ref 136–145)
VANCOMYCIN TROUGH SERPL-MCNC: <0.8 ΜG/ML (ref 10–20)
WBC # BLD AUTO: 19.78 K/UL (ref 4.5–11)

## 2021-11-14 PROCEDURE — 36415 COLL VENOUS BLD VENIPUNCTURE: CPT | Performed by: STUDENT IN AN ORGANIZED HEALTH CARE EDUCATION/TRAINING PROGRAM

## 2021-11-14 PROCEDURE — 80048 BASIC METABOLIC PNL TOTAL CA: CPT | Performed by: STUDENT IN AN ORGANIZED HEALTH CARE EDUCATION/TRAINING PROGRAM

## 2021-11-14 PROCEDURE — 36000712 HC OR TIME LEV V 1ST 15 MIN: Performed by: STUDENT IN AN ORGANIZED HEALTH CARE EDUCATION/TRAINING PROGRAM

## 2021-11-14 PROCEDURE — 11006 DBRDMT SKIN XTRNL GENT PER: CPT | Mod: 79,51,, | Performed by: STUDENT IN AN ORGANIZED HEALTH CARE EDUCATION/TRAINING PROGRAM

## 2021-11-14 PROCEDURE — 85025 COMPLETE CBC W/AUTO DIFF WBC: CPT | Performed by: STUDENT IN AN ORGANIZED HEALTH CARE EDUCATION/TRAINING PROGRAM

## 2021-11-14 PROCEDURE — 27000716 HC OXISENSOR PROBE, ANY SIZE: Performed by: ANESTHESIOLOGY

## 2021-11-14 PROCEDURE — D9220A PRA ANESTHESIA: Mod: CRNA,,, | Performed by: NURSE ANESTHETIST, CERTIFIED REGISTERED

## 2021-11-14 PROCEDURE — 63600175 PHARM REV CODE 636 W HCPCS: Performed by: NURSE ANESTHETIST, CERTIFIED REGISTERED

## 2021-11-14 PROCEDURE — 44188 LAP COLOSTOMY: CPT | Mod: 79,,, | Performed by: STUDENT IN AN ORGANIZED HEALTH CARE EDUCATION/TRAINING PROGRAM

## 2021-11-14 PROCEDURE — 84100 ASSAY OF PHOSPHORUS: CPT | Performed by: STUDENT IN AN ORGANIZED HEALTH CARE EDUCATION/TRAINING PROGRAM

## 2021-11-14 PROCEDURE — 63600175 PHARM REV CODE 636 W HCPCS: Performed by: STUDENT IN AN ORGANIZED HEALTH CARE EDUCATION/TRAINING PROGRAM

## 2021-11-14 PROCEDURE — 20000000 HC ICU ROOM

## 2021-11-14 PROCEDURE — 25000003 PHARM REV CODE 250: Performed by: STUDENT IN AN ORGANIZED HEALTH CARE EDUCATION/TRAINING PROGRAM

## 2021-11-14 PROCEDURE — 11006 PR DEBRIDE NECROT SKIN/ TISS, GENIT,PERI, ADB WALL: ICD-10-PCS | Mod: 79,51,, | Performed by: STUDENT IN AN ORGANIZED HEALTH CARE EDUCATION/TRAINING PROGRAM

## 2021-11-14 PROCEDURE — 80202 ASSAY OF VANCOMYCIN: CPT | Performed by: INTERNAL MEDICINE

## 2021-11-14 PROCEDURE — 27000165 HC TUBE, ETT CUFFED: Performed by: ANESTHESIOLOGY

## 2021-11-14 PROCEDURE — 27201423 OPTIME MED/SURG SUP & DEVICES STERILE SUPPLY: Performed by: STUDENT IN AN ORGANIZED HEALTH CARE EDUCATION/TRAINING PROGRAM

## 2021-11-14 PROCEDURE — 37000008 HC ANESTHESIA 1ST 15 MINUTES: Performed by: STUDENT IN AN ORGANIZED HEALTH CARE EDUCATION/TRAINING PROGRAM

## 2021-11-14 PROCEDURE — 99900035 HC TECH TIME PER 15 MIN (STAT)

## 2021-11-14 PROCEDURE — 36415 COLL VENOUS BLD VENIPUNCTURE: CPT | Performed by: INTERNAL MEDICINE

## 2021-11-14 PROCEDURE — D9220A PRA ANESTHESIA: ICD-10-PCS | Mod: ANES,,, | Performed by: ANESTHESIOLOGY

## 2021-11-14 PROCEDURE — 27000655: Performed by: ANESTHESIOLOGY

## 2021-11-14 PROCEDURE — 63600175 PHARM REV CODE 636 W HCPCS: Performed by: INTERNAL MEDICINE

## 2021-11-14 PROCEDURE — 25000242 PHARM REV CODE 250 ALT 637 W/ HCPCS

## 2021-11-14 PROCEDURE — 37000009 HC ANESTHESIA EA ADD 15 MINS: Performed by: STUDENT IN AN ORGANIZED HEALTH CARE EDUCATION/TRAINING PROGRAM

## 2021-11-14 PROCEDURE — 27000510 HC BLANKET BAIR HUGGER ANY SIZE: Performed by: ANESTHESIOLOGY

## 2021-11-14 PROCEDURE — 94761 N-INVAS EAR/PLS OXIMETRY MLT: CPT

## 2021-11-14 PROCEDURE — 27000260 *HC AIRWAY ORAL: Performed by: ANESTHESIOLOGY

## 2021-11-14 PROCEDURE — 25000003 PHARM REV CODE 250: Performed by: NURSE ANESTHETIST, CERTIFIED REGISTERED

## 2021-11-14 PROCEDURE — D9220A PRA ANESTHESIA: Mod: ANES,,, | Performed by: ANESTHESIOLOGY

## 2021-11-14 PROCEDURE — D9220A PRA ANESTHESIA: ICD-10-PCS | Mod: CRNA,,, | Performed by: NURSE ANESTHETIST, CERTIFIED REGISTERED

## 2021-11-14 PROCEDURE — 25000003 PHARM REV CODE 250: Performed by: INTERNAL MEDICINE

## 2021-11-14 PROCEDURE — 99233 SBSQ HOSP IP/OBS HIGH 50: CPT | Mod: ,,, | Performed by: INTERNAL MEDICINE

## 2021-11-14 PROCEDURE — 27000689 HC BLADE LARYNGOSCOPE ANY SIZE: Performed by: ANESTHESIOLOGY

## 2021-11-14 PROCEDURE — P9045 ALBUMIN (HUMAN), 5%, 250 ML: HCPCS | Performed by: INTERNAL MEDICINE

## 2021-11-14 PROCEDURE — 27000221 HC OXYGEN, UP TO 24 HOURS

## 2021-11-14 PROCEDURE — 44188 PR LAP, SURG COLOSTOMY: ICD-10-PCS | Mod: 79,,, | Performed by: STUDENT IN AN ORGANIZED HEALTH CARE EDUCATION/TRAINING PROGRAM

## 2021-11-14 PROCEDURE — 82962 GLUCOSE BLOOD TEST: CPT

## 2021-11-14 PROCEDURE — 36000713 HC OR TIME LEV V EA ADD 15 MIN: Performed by: STUDENT IN AN ORGANIZED HEALTH CARE EDUCATION/TRAINING PROGRAM

## 2021-11-14 PROCEDURE — 99233 PR SUBSEQUENT HOSPITAL CARE,LEVL III: ICD-10-PCS | Mod: ,,, | Performed by: INTERNAL MEDICINE

## 2021-11-14 RX ORDER — IPRATROPIUM BROMIDE AND ALBUTEROL SULFATE 2.5; .5 MG/3ML; MG/3ML
3 SOLUTION RESPIRATORY (INHALATION) EVERY 4 HOURS PRN
Status: DISCONTINUED | OUTPATIENT
Start: 2021-11-14 | End: 2021-12-10 | Stop reason: HOSPADM

## 2021-11-14 RX ORDER — GLYCOPYRROLATE 0.2 MG/ML
INJECTION INTRAMUSCULAR; INTRAVENOUS
Status: DISCONTINUED | OUTPATIENT
Start: 2021-11-14 | End: 2021-11-14

## 2021-11-14 RX ORDER — NEOSTIGMINE METHYLSULFATE 1 MG/ML
INJECTION, SOLUTION INTRAVENOUS
Status: DISCONTINUED | OUTPATIENT
Start: 2021-11-14 | End: 2021-11-14

## 2021-11-14 RX ORDER — LIDOCAINE HYDROCHLORIDE AND EPINEPHRINE 10; 10 MG/ML; UG/ML
INJECTION, SOLUTION INFILTRATION; PERINEURAL
Status: DISCONTINUED | OUTPATIENT
Start: 2021-11-14 | End: 2021-11-14 | Stop reason: HOSPADM

## 2021-11-14 RX ORDER — LIDOCAINE HYDROCHLORIDE 20 MG/ML
INJECTION, SOLUTION EPIDURAL; INFILTRATION; INTRACAUDAL; PERINEURAL
Status: DISCONTINUED | OUTPATIENT
Start: 2021-11-14 | End: 2021-11-14

## 2021-11-14 RX ORDER — FENTANYL CITRATE 50 UG/ML
INJECTION, SOLUTION INTRAMUSCULAR; INTRAVENOUS
Status: DISCONTINUED | OUTPATIENT
Start: 2021-11-14 | End: 2021-11-14

## 2021-11-14 RX ORDER — AMLODIPINE BESYLATE 10 MG/1
10 TABLET ORAL DAILY
Status: DISCONTINUED | OUTPATIENT
Start: 2021-11-15 | End: 2021-11-14

## 2021-11-14 RX ORDER — PROPOFOL 10 MG/ML
VIAL (ML) INTRAVENOUS
Status: DISCONTINUED | OUTPATIENT
Start: 2021-11-14 | End: 2021-11-14

## 2021-11-14 RX ORDER — IPRATROPIUM BROMIDE AND ALBUTEROL SULFATE 2.5; .5 MG/3ML; MG/3ML
SOLUTION RESPIRATORY (INHALATION)
Status: COMPLETED
Start: 2021-11-14 | End: 2021-11-14

## 2021-11-14 RX ORDER — AMLODIPINE BESYLATE 10 MG/1
10 TABLET ORAL DAILY
Status: DISCONTINUED | OUTPATIENT
Start: 2021-11-14 | End: 2021-12-10 | Stop reason: HOSPADM

## 2021-11-14 RX ORDER — ROCURONIUM BROMIDE 10 MG/ML
INJECTION, SOLUTION INTRAVENOUS
Status: DISCONTINUED | OUTPATIENT
Start: 2021-11-14 | End: 2021-11-14

## 2021-11-14 RX ORDER — BUPIVACAINE HYDROCHLORIDE 2.5 MG/ML
INJECTION, SOLUTION EPIDURAL; INFILTRATION; INTRACAUDAL
Status: DISCONTINUED | OUTPATIENT
Start: 2021-11-14 | End: 2021-11-14 | Stop reason: HOSPADM

## 2021-11-14 RX ORDER — LABETALOL HYDROCHLORIDE 5 MG/ML
INJECTION, SOLUTION INTRAVENOUS
Status: DISCONTINUED | OUTPATIENT
Start: 2021-11-14 | End: 2021-11-14

## 2021-11-14 RX ORDER — ALBUMIN HUMAN 50 G/1000ML
25 SOLUTION INTRAVENOUS ONCE
Status: COMPLETED | OUTPATIENT
Start: 2021-11-14 | End: 2021-11-14

## 2021-11-14 RX ADMIN — SODIUM CHLORIDE: 9 INJECTION, SOLUTION INTRAVENOUS at 03:11

## 2021-11-14 RX ADMIN — AMLODIPINE BESYLATE 10 MG: 10 TABLET ORAL at 04:11

## 2021-11-14 RX ADMIN — HYDROMORPHONE HYDROCHLORIDE 1 MG: 2 INJECTION INTRAMUSCULAR; INTRAVENOUS; SUBCUTANEOUS at 04:11

## 2021-11-14 RX ADMIN — LABETALOL HYDROCHLORIDE 5 MG: 5 INJECTION, SOLUTION INTRAVENOUS at 09:11

## 2021-11-14 RX ADMIN — ROCURONIUM BROMIDE 50 MG: 10 INJECTION, SOLUTION INTRAVENOUS at 08:11

## 2021-11-14 RX ADMIN — PIPERACILLIN AND TAZOBACTAM 4.5 G: 4; .5 INJECTION, POWDER, FOR SOLUTION INTRAVENOUS at 02:11

## 2021-11-14 RX ADMIN — PROPOFOL 150 MG: 10 INJECTION, EMULSION INTRAVENOUS at 08:11

## 2021-11-14 RX ADMIN — CLINDAMYCIN IN 5 PERCENT DEXTROSE 900 MG: 18 INJECTION, SOLUTION INTRAVENOUS at 02:11

## 2021-11-14 RX ADMIN — LIDOCAINE HYDROCHLORIDE 100 MG: 20 INJECTION, SOLUTION INTRAVENOUS at 08:11

## 2021-11-14 RX ADMIN — FENTANYL CITRATE 100 MCG: 50 INJECTION INTRAMUSCULAR; INTRAVENOUS at 08:11

## 2021-11-14 RX ADMIN — OXYCODONE HYDROCHLORIDE AND ACETAMINOPHEN 1 TABLET: 10; 325 TABLET ORAL at 01:11

## 2021-11-14 RX ADMIN — HYDROMORPHONE HYDROCHLORIDE 1 MG: 2 INJECTION INTRAMUSCULAR; INTRAVENOUS; SUBCUTANEOUS at 08:11

## 2021-11-14 RX ADMIN — HYDROMORPHONE HYDROCHLORIDE 1 MG: 2 INJECTION INTRAMUSCULAR; INTRAVENOUS; SUBCUTANEOUS at 01:11

## 2021-11-14 RX ADMIN — CLINDAMYCIN IN 5 PERCENT DEXTROSE 900 MG: 18 INJECTION, SOLUTION INTRAVENOUS at 11:11

## 2021-11-14 RX ADMIN — ALBUMIN (HUMAN) 25 G: 2.5 SOLUTION INTRAVENOUS at 12:11

## 2021-11-14 RX ADMIN — NEOSTIGMINE METHYLSULFATE 3 MG: 1 INJECTION INTRAVENOUS at 11:11

## 2021-11-14 RX ADMIN — GLYCOPYRROLATE 0.4 MG: 0.2 INJECTION INTRAMUSCULAR; INTRAVENOUS at 11:11

## 2021-11-14 RX ADMIN — FENTANYL CITRATE 100 MCG: 50 INJECTION INTRAMUSCULAR; INTRAVENOUS at 09:11

## 2021-11-14 RX ADMIN — SUGAMMADEX 200 MG: 100 INJECTION, SOLUTION INTRAVENOUS at 11:11

## 2021-11-14 RX ADMIN — PIPERACILLIN AND TAZOBACTAM 4.5 G: 4; .5 INJECTION, POWDER, FOR SOLUTION INTRAVENOUS at 01:11

## 2021-11-14 RX ADMIN — CLINDAMYCIN IN 5 PERCENT DEXTROSE 900 MG: 18 INJECTION, SOLUTION INTRAVENOUS at 06:11

## 2021-11-14 RX ADMIN — SODIUM CHLORIDE: 9 INJECTION, SOLUTION INTRAVENOUS at 11:11

## 2021-11-14 RX ADMIN — SODIUM CHLORIDE 2.24 UNITS/HR: 9 INJECTION, SOLUTION INTRAVENOUS at 12:11

## 2021-11-14 RX ADMIN — ONDANSETRON 4 MG: 2 INJECTION INTRAMUSCULAR; INTRAVENOUS at 11:11

## 2021-11-14 RX ADMIN — SODIUM CHLORIDE: 9 INJECTION, SOLUTION INTRAVENOUS at 05:11

## 2021-11-14 RX ADMIN — IPRATROPIUM BROMIDE AND ALBUTEROL SULFATE 3 ML: .5; 3 SOLUTION RESPIRATORY (INHALATION) at 12:11

## 2021-11-14 RX ADMIN — PIPERACILLIN AND TAZOBACTAM 4.5 G: 4; .5 INJECTION, POWDER, FOR SOLUTION INTRAVENOUS at 09:11

## 2021-11-15 PROBLEM — E87.6 HYPOKALEMIA: Status: ACTIVE | Noted: 2021-11-15

## 2021-11-15 LAB
ACETONE SERPL QL SCN: NEGATIVE
ANION GAP SERPL CALCULATED.3IONS-SCNC: 13 MMOL/L (ref 7–16)
ANION GAP SERPL CALCULATED.3IONS-SCNC: 14 MMOL/L (ref 7–16)
ANION GAP SERPL CALCULATED.3IONS-SCNC: 17 MMOL/L (ref 7–16)
BACTERIA SPEC ANAEROBE CULT: NORMAL
BASOPHILS # BLD AUTO: 0.05 K/UL (ref 0–0.2)
BASOPHILS NFR BLD AUTO: 0.3 % (ref 0–1)
BUN SERPL-MCNC: 35 MG/DL (ref 7–18)
BUN SERPL-MCNC: 39 MG/DL (ref 7–18)
BUN SERPL-MCNC: 39 MG/DL (ref 7–18)
BUN/CREAT SERPL: 9 (ref 6–20)
CALCIUM SERPL-MCNC: 8.2 MG/DL (ref 8.5–10.1)
CALCIUM SERPL-MCNC: 8.4 MG/DL (ref 8.5–10.1)
CALCIUM SERPL-MCNC: 8.4 MG/DL (ref 8.5–10.1)
CHLORIDE SERPL-SCNC: 109 MMOL/L (ref 98–107)
CHLORIDE SERPL-SCNC: 109 MMOL/L (ref 98–107)
CHLORIDE SERPL-SCNC: 111 MMOL/L (ref 98–107)
CO2 SERPL-SCNC: 12 MMOL/L (ref 21–32)
CO2 SERPL-SCNC: 16 MMOL/L (ref 21–32)
CO2 SERPL-SCNC: 18 MMOL/L (ref 21–32)
CREAT SERPL-MCNC: 4.01 MG/DL (ref 0.7–1.3)
CREAT SERPL-MCNC: 4.12 MG/DL (ref 0.7–1.3)
CREAT SERPL-MCNC: 4.21 MG/DL (ref 0.7–1.3)
DIFFERENTIAL METHOD BLD: ABNORMAL
EOSINOPHIL # BLD AUTO: 0.01 K/UL (ref 0–0.5)
EOSINOPHIL NFR BLD AUTO: 0.1 % (ref 1–4)
ERYTHROCYTE [DISTWIDTH] IN BLOOD BY AUTOMATED COUNT: 13.8 % (ref 11.5–14.5)
GLUCOSE SERPL-MCNC: 188 MG/DL (ref 74–106)
GLUCOSE SERPL-MCNC: 201 MG/DL (ref 70–105)
GLUCOSE SERPL-MCNC: 201 MG/DL (ref 70–105)
GLUCOSE SERPL-MCNC: 202 MG/DL (ref 70–105)
GLUCOSE SERPL-MCNC: 210 MG/DL (ref 70–105)
GLUCOSE SERPL-MCNC: 215 MG/DL (ref 70–105)
GLUCOSE SERPL-MCNC: 217 MG/DL (ref 70–105)
GLUCOSE SERPL-MCNC: 222 MG/DL (ref 74–106)
GLUCOSE SERPL-MCNC: 224 MG/DL (ref 70–105)
GLUCOSE SERPL-MCNC: 224 MG/DL (ref 74–106)
GLUCOSE SERPL-MCNC: 229 MG/DL (ref 70–105)
GLUCOSE SERPL-MCNC: 239 MG/DL (ref 70–105)
GLUCOSE SERPL-MCNC: 241 MG/DL (ref 70–105)
GLUCOSE SERPL-MCNC: 250 MG/DL (ref 70–105)
GLUCOSE SERPL-MCNC: 261 MG/DL (ref 70–105)
GLUCOSE SERPL-MCNC: 272 MG/DL (ref 70–105)
GLUCOSE SERPL-MCNC: 276 MG/DL (ref 70–105)
GLUCOSE SERPL-MCNC: 283 MG/DL (ref 70–105)
GLUCOSE SERPL-MCNC: 300 MG/DL (ref 70–105)
GLUCOSE SERPL-MCNC: 389 MG/DL (ref 70–105)
HCO3 UR-SCNC: 14.1 MMOL/L (ref 21–28)
HCT VFR BLD AUTO: 30.4 % (ref 40–54)
HGB BLD-MCNC: 10.2 G/DL (ref 13.5–18)
IMM GRANULOCYTES # BLD AUTO: 0.28 K/UL (ref 0–0.04)
IMM GRANULOCYTES NFR BLD: 1.8 % (ref 0–0.4)
LACTATE SERPL-SCNC: 0.7 MMOL/L (ref 0.4–2)
LYMPHOCYTES # BLD AUTO: 0.75 K/UL (ref 1–4.8)
LYMPHOCYTES NFR BLD AUTO: 4.7 % (ref 27–41)
LYMPHOCYTES NFR BLD MANUAL: 12 % (ref 27–41)
MCH RBC QN AUTO: 30.7 PG (ref 27–31)
MCHC RBC AUTO-ENTMCNC: 33.6 G/DL (ref 32–36)
MCV RBC AUTO: 91.6 FL (ref 80–96)
MICROORGANISM SPEC CULT: NORMAL
MONOCYTES # BLD AUTO: 0.83 K/UL (ref 0–0.8)
MONOCYTES NFR BLD AUTO: 5.2 % (ref 2–6)
MONOCYTES NFR BLD MANUAL: 6 % (ref 2–6)
MPC BLD CALC-MCNC: 10.3 FL (ref 9.4–12.4)
NEUTROPHILS # BLD AUTO: 13.97 K/UL (ref 1.8–7.7)
NEUTROPHILS NFR BLD AUTO: 87.9 % (ref 53–65)
NEUTS BAND NFR BLD MANUAL: 9 % (ref 1–5)
NEUTS SEG NFR BLD MANUAL: 73 % (ref 50–62)
NRBC # BLD AUTO: 0.02 X10E3/UL
NRBC, AUTO (.00): 0.1 %
PCO2 BLDA: 36 MMHG (ref 35–48)
PH SMN: 7.2 [PH] (ref 7.35–7.45)
PHOSPHATE SERPL-MCNC: 2.6 MG/DL (ref 2.5–4.5)
PHOSPHATE SERPL-MCNC: 2.9 MG/DL (ref 2.5–4.5)
PHOSPHATE SERPL-MCNC: 3 MG/DL (ref 2.5–4.5)
PLATELET # BLD AUTO: 283 K/UL (ref 150–400)
PLATELET MORPHOLOGY: ABNORMAL
PO2 BLDA: 47 MMHG (ref 83–108)
POC BASE EXCESS: -13 MMOL/L (ref -2–3)
POC SATURATED O2: 71 % (ref 95–98)
POTASSIUM SERPL-SCNC: 3.2 MMOL/L (ref 3.5–5.1)
POTASSIUM SERPL-SCNC: 3.3 MMOL/L (ref 3.5–5.1)
POTASSIUM SERPL-SCNC: 3.4 MMOL/L (ref 3.5–5.1)
RBC # BLD AUTO: 3.32 M/UL (ref 4.6–6.2)
RBC MORPH BLD: NORMAL
SODIUM SERPL-SCNC: 136 MMOL/L (ref 136–145)
SODIUM SERPL-SCNC: 137 MMOL/L (ref 136–145)
SODIUM SERPL-SCNC: 137 MMOL/L (ref 136–145)
WBC # BLD AUTO: 15.89 K/UL (ref 4.5–11)

## 2021-11-15 PROCEDURE — 63600175 PHARM REV CODE 636 W HCPCS: Performed by: STUDENT IN AN ORGANIZED HEALTH CARE EDUCATION/TRAINING PROGRAM

## 2021-11-15 PROCEDURE — 82009 KETONE BODYS QUAL: CPT | Performed by: NURSE PRACTITIONER

## 2021-11-15 PROCEDURE — 36600 WITHDRAWAL OF ARTERIAL BLOOD: CPT

## 2021-11-15 PROCEDURE — 99233 SBSQ HOSP IP/OBS HIGH 50: CPT | Mod: ,,, | Performed by: INTERNAL MEDICINE

## 2021-11-15 PROCEDURE — 25000003 PHARM REV CODE 250: Performed by: INTERNAL MEDICINE

## 2021-11-15 PROCEDURE — 36415 COLL VENOUS BLD VENIPUNCTURE: CPT | Performed by: STUDENT IN AN ORGANIZED HEALTH CARE EDUCATION/TRAINING PROGRAM

## 2021-11-15 PROCEDURE — 94761 N-INVAS EAR/PLS OXIMETRY MLT: CPT

## 2021-11-15 PROCEDURE — 83605 ASSAY OF LACTIC ACID: CPT | Performed by: NURSE PRACTITIONER

## 2021-11-15 PROCEDURE — 85025 COMPLETE CBC W/AUTO DIFF WBC: CPT | Performed by: STUDENT IN AN ORGANIZED HEALTH CARE EDUCATION/TRAINING PROGRAM

## 2021-11-15 PROCEDURE — S5010 5% DEXTROSE AND 0.45% SALINE: HCPCS | Performed by: NURSE PRACTITIONER

## 2021-11-15 PROCEDURE — 99233 PR SUBSEQUENT HOSPITAL CARE,LEVL III: ICD-10-PCS | Mod: ,,, | Performed by: INTERNAL MEDICINE

## 2021-11-15 PROCEDURE — 25000003 PHARM REV CODE 250: Performed by: STUDENT IN AN ORGANIZED HEALTH CARE EDUCATION/TRAINING PROGRAM

## 2021-11-15 PROCEDURE — 20000000 HC ICU ROOM

## 2021-11-15 PROCEDURE — 25000003 PHARM REV CODE 250: Performed by: NURSE PRACTITIONER

## 2021-11-15 PROCEDURE — 82803 BLOOD GASES ANY COMBINATION: CPT

## 2021-11-15 PROCEDURE — 80048 BASIC METABOLIC PNL TOTAL CA: CPT | Performed by: STUDENT IN AN ORGANIZED HEALTH CARE EDUCATION/TRAINING PROGRAM

## 2021-11-15 PROCEDURE — 99900035 HC TECH TIME PER 15 MIN (STAT)

## 2021-11-15 PROCEDURE — C9399 UNCLASSIFIED DRUGS OR BIOLOG: HCPCS | Performed by: NURSE PRACTITIONER

## 2021-11-15 PROCEDURE — 63600175 PHARM REV CODE 636 W HCPCS: Performed by: NURSE PRACTITIONER

## 2021-11-15 PROCEDURE — 84100 ASSAY OF PHOSPHORUS: CPT | Performed by: STUDENT IN AN ORGANIZED HEALTH CARE EDUCATION/TRAINING PROGRAM

## 2021-11-15 PROCEDURE — 36592 COLLECT BLOOD FROM PICC: CPT | Performed by: STUDENT IN AN ORGANIZED HEALTH CARE EDUCATION/TRAINING PROGRAM

## 2021-11-15 PROCEDURE — 82962 GLUCOSE BLOOD TEST: CPT

## 2021-11-15 PROCEDURE — 27000221 HC OXYGEN, UP TO 24 HOURS

## 2021-11-15 RX ORDER — DEXTROSE MONOHYDRATE AND SODIUM CHLORIDE 5; .45 G/100ML; G/100ML
INJECTION, SOLUTION INTRAVENOUS CONTINUOUS
Status: DISCONTINUED | OUTPATIENT
Start: 2021-11-15 | End: 2021-11-16

## 2021-11-15 RX ORDER — GLUCAGON 1 MG
1 KIT INJECTION
Status: DISCONTINUED | OUTPATIENT
Start: 2021-11-15 | End: 2021-12-10 | Stop reason: HOSPADM

## 2021-11-15 RX ORDER — SODIUM CHLORIDE, SODIUM GLUCONATE, SODIUM ACETATE, POTASSIUM CHLORIDE AND MAGNESIUM CHLORIDE 30; 37; 368; 526; 502 MG/100ML; MG/100ML; MG/100ML; MG/100ML; MG/100ML
1000 INJECTION, SOLUTION INTRAVENOUS CONTINUOUS
Status: DISPENSED | OUTPATIENT
Start: 2021-11-15 | End: 2021-11-15

## 2021-11-15 RX ORDER — METOPROLOL TARTRATE 25 MG/1
25 TABLET, FILM COATED ORAL 2 TIMES DAILY
Status: DISCONTINUED | OUTPATIENT
Start: 2021-11-15 | End: 2021-11-24

## 2021-11-15 RX ORDER — INSULIN ASPART 100 [IU]/ML
1-10 INJECTION, SOLUTION INTRAVENOUS; SUBCUTANEOUS
Status: DISCONTINUED | OUTPATIENT
Start: 2021-11-15 | End: 2021-11-27

## 2021-11-15 RX ORDER — FUROSEMIDE 10 MG/ML
60 INJECTION INTRAMUSCULAR; INTRAVENOUS ONCE
Status: COMPLETED | OUTPATIENT
Start: 2021-11-15 | End: 2021-11-15

## 2021-11-15 RX ORDER — HYDROCODONE BITARTRATE AND ACETAMINOPHEN 5; 325 MG/1; MG/1
1 TABLET ORAL EVERY 6 HOURS PRN
Status: DISCONTINUED | OUTPATIENT
Start: 2021-11-15 | End: 2021-11-24

## 2021-11-15 RX ORDER — LINEZOLID 2 MG/ML
600 INJECTION, SOLUTION INTRAVENOUS
Status: DISCONTINUED | OUTPATIENT
Start: 2021-11-15 | End: 2021-11-18

## 2021-11-15 RX ORDER — SODIUM BICARBONATE 650 MG/1
650 TABLET ORAL 3 TIMES DAILY
Status: DISCONTINUED | OUTPATIENT
Start: 2021-11-15 | End: 2021-12-10 | Stop reason: HOSPADM

## 2021-11-15 RX ORDER — FOLIC ACID 1 MG/1
1 TABLET ORAL DAILY
Status: DISCONTINUED | OUTPATIENT
Start: 2021-11-16 | End: 2021-12-10 | Stop reason: HOSPADM

## 2021-11-15 RX ORDER — THIAMINE HCL 100 MG
100 TABLET ORAL DAILY
Status: DISCONTINUED | OUTPATIENT
Start: 2021-11-16 | End: 2021-11-24

## 2021-11-15 RX ORDER — POTASSIUM CHLORIDE 20 MEQ/1
40 TABLET, EXTENDED RELEASE ORAL ONCE
Status: COMPLETED | OUTPATIENT
Start: 2021-11-15 | End: 2021-11-15

## 2021-11-15 RX ADMIN — ENOXAPARIN SODIUM 40 MG: 40 INJECTION SUBCUTANEOUS at 04:11

## 2021-11-15 RX ADMIN — SODIUM BICARBONATE 650 MG: 650 TABLET ORAL at 02:11

## 2021-11-15 RX ADMIN — SODIUM CHLORIDE: 9 INJECTION, SOLUTION INTRAVENOUS at 05:11

## 2021-11-15 RX ADMIN — DEXTROSE AND SODIUM CHLORIDE: 5; 450 INJECTION, SOLUTION INTRAVENOUS at 01:11

## 2021-11-15 RX ADMIN — OXYCODONE HYDROCHLORIDE AND ACETAMINOPHEN 1 TABLET: 10; 325 TABLET ORAL at 02:11

## 2021-11-15 RX ADMIN — OXYCODONE HYDROCHLORIDE AND ACETAMINOPHEN 1 TABLET: 10; 325 TABLET ORAL at 10:11

## 2021-11-15 RX ADMIN — PIPERACILLIN AND TAZOBACTAM 4.5 G: 4; .5 INJECTION, POWDER, FOR SOLUTION INTRAVENOUS at 02:11

## 2021-11-15 RX ADMIN — CLINDAMYCIN IN 5 PERCENT DEXTROSE 900 MG: 18 INJECTION, SOLUTION INTRAVENOUS at 06:11

## 2021-11-15 RX ADMIN — INSULIN ASPART 6 UNITS: 100 INJECTION, SOLUTION INTRAVENOUS; SUBCUTANEOUS at 04:11

## 2021-11-15 RX ADMIN — SODIUM CHLORIDE, SODIUM GLUCONATE, SODIUM ACETATE, POTASSIUM CHLORIDE AND MAGNESIUM CHLORIDE 1000 ML: 526; 502; 368; 37; 30 INJECTION, SOLUTION INTRAVENOUS at 10:11

## 2021-11-15 RX ADMIN — POTASSIUM BICARBONATE 20 MEQ: 782 TABLET, EFFERVESCENT ORAL at 08:11

## 2021-11-15 RX ADMIN — PIPERACILLIN AND TAZOBACTAM 4.5 G: 4; .5 INJECTION, POWDER, FOR SOLUTION INTRAVENOUS at 05:11

## 2021-11-15 RX ADMIN — AMLODIPINE BESYLATE 10 MG: 10 TABLET ORAL at 10:11

## 2021-11-15 RX ADMIN — POTASSIUM CHLORIDE 40 MEQ: 20 TABLET, EXTENDED RELEASE ORAL at 12:11

## 2021-11-15 RX ADMIN — INSULIN DETEMIR 35 UNITS: 100 INJECTION, SOLUTION SUBCUTANEOUS at 02:11

## 2021-11-15 RX ADMIN — LINEZOLID 600 MG: 600 INJECTION, SOLUTION INTRAVENOUS at 12:11

## 2021-11-15 RX ADMIN — SODIUM BICARBONATE 650 MG: 650 TABLET ORAL at 08:11

## 2021-11-15 RX ADMIN — FUROSEMIDE 60 MG: 20 INJECTION, SOLUTION INTRAMUSCULAR; INTRAVENOUS at 06:11

## 2021-11-15 RX ADMIN — CLINDAMYCIN IN 5 PERCENT DEXTROSE 900 MG: 18 INJECTION, SOLUTION INTRAVENOUS at 10:11

## 2021-11-15 RX ADMIN — CLINDAMYCIN IN 5 PERCENT DEXTROSE 900 MG: 18 INJECTION, SOLUTION INTRAVENOUS at 04:11

## 2021-11-15 RX ADMIN — PIPERACILLIN AND TAZOBACTAM 4.5 G: 4; .5 INJECTION, POWDER, FOR SOLUTION INTRAVENOUS at 09:11

## 2021-11-15 RX ADMIN — INSULIN ASPART 5 UNITS: 100 INJECTION, SOLUTION INTRAVENOUS; SUBCUTANEOUS at 09:11

## 2021-11-15 RX ADMIN — METOPROLOL TARTRATE 25 MG: 25 TABLET, FILM COATED ORAL at 08:11

## 2021-11-16 PROBLEM — R00.0 TACHYCARDIA: Status: ACTIVE | Noted: 2021-11-16

## 2021-11-16 PROBLEM — J96.01 ACUTE HYPOXEMIC RESPIRATORY FAILURE: Status: ACTIVE | Noted: 2021-11-16

## 2021-11-16 PROBLEM — J96.01 ACUTE RESPIRATORY FAILURE WITH HYPOXIA AND HYPERCARBIA: Status: RESOLVED | Noted: 2021-11-16 | Resolved: 2021-11-16

## 2021-11-16 PROBLEM — E87.20 METABOLIC ACIDOSIS: Status: ACTIVE | Noted: 2021-11-16

## 2021-11-16 PROBLEM — J96.01 ACUTE RESPIRATORY FAILURE WITH HYPOXIA AND HYPERCARBIA: Status: ACTIVE | Noted: 2021-11-16

## 2021-11-16 PROBLEM — J96.02 ACUTE RESPIRATORY FAILURE WITH HYPOXIA AND HYPERCARBIA: Status: RESOLVED | Noted: 2021-11-16 | Resolved: 2021-11-16

## 2021-11-16 PROBLEM — J96.02 ACUTE RESPIRATORY FAILURE WITH HYPOXIA AND HYPERCARBIA: Status: ACTIVE | Noted: 2021-11-16

## 2021-11-16 PROBLEM — F05 ACUTE CONFUSIONAL STATE: Status: ACTIVE | Noted: 2021-11-16

## 2021-11-16 LAB
ANION GAP SERPL CALCULATED.3IONS-SCNC: 17 MMOL/L (ref 7–16)
BASOPHILS # BLD AUTO: 0.06 K/UL (ref 0–0.2)
BASOPHILS NFR BLD AUTO: 0.4 % (ref 0–1)
BUN SERPL-MCNC: 43 MG/DL (ref 7–18)
BUN/CREAT SERPL: 9 (ref 6–20)
CALCIUM SERPL-MCNC: 8.3 MG/DL (ref 8.5–10.1)
CHLORIDE SERPL-SCNC: 108 MMOL/L (ref 98–107)
CO2 SERPL-SCNC: 16 MMOL/L (ref 21–32)
CREAT SERPL-MCNC: 4.74 MG/DL (ref 0.7–1.3)
DIFFERENTIAL METHOD BLD: ABNORMAL
EOSINOPHIL # BLD AUTO: 0.02 K/UL (ref 0–0.5)
EOSINOPHIL NFR BLD AUTO: 0.1 % (ref 1–4)
ERYTHROCYTE [DISTWIDTH] IN BLOOD BY AUTOMATED COUNT: 13.8 % (ref 11.5–14.5)
GLUCOSE SERPL-MCNC: 214 MG/DL (ref 70–105)
GLUCOSE SERPL-MCNC: 266 MG/DL (ref 70–105)
GLUCOSE SERPL-MCNC: 337 MG/DL (ref 74–106)
GLUCOSE SERPL-MCNC: 347 MG/DL (ref 70–105)
GLUCOSE SERPL-MCNC: 349 MG/DL (ref 70–105)
GLUCOSE SERPL-MCNC: 382 MG/DL (ref 70–105)
GLUCOSE SERPL-MCNC: 405 MG/DL (ref 70–105)
HCT VFR BLD AUTO: 25.6 % (ref 40–54)
HGB BLD-MCNC: 8.6 G/DL (ref 13.5–18)
IMM GRANULOCYTES # BLD AUTO: 0.33 K/UL (ref 0–0.04)
IMM GRANULOCYTES NFR BLD: 2.1 % (ref 0–0.4)
LYMPHOCYTES # BLD AUTO: 0.97 K/UL (ref 1–4.8)
LYMPHOCYTES NFR BLD AUTO: 6.1 % (ref 27–41)
LYMPHOCYTES NFR BLD MANUAL: 5 % (ref 27–41)
MCH RBC QN AUTO: 30.4 PG (ref 27–31)
MCHC RBC AUTO-ENTMCNC: 33.6 G/DL (ref 32–36)
MCV RBC AUTO: 90.5 FL (ref 80–96)
METAMYELOCYTES NFR BLD MANUAL: 1 %
MICROORGANISM SPEC CULT: ABNORMAL
MONOCYTES # BLD AUTO: 1.17 K/UL (ref 0–0.8)
MONOCYTES NFR BLD AUTO: 7.3 % (ref 2–6)
MONOCYTES NFR BLD MANUAL: 8 % (ref 2–6)
MPC BLD CALC-MCNC: 9.5 FL (ref 9.4–12.4)
NEUTROPHILS # BLD AUTO: 13.45 K/UL (ref 1.8–7.7)
NEUTROPHILS NFR BLD AUTO: 84 % (ref 53–65)
NEUTS BAND NFR BLD MANUAL: 11 % (ref 1–5)
NEUTS SEG NFR BLD MANUAL: 75 % (ref 50–62)
NRBC # BLD AUTO: 0 X10E3/UL
NRBC, AUTO (.00): 0 %
PLATELET # BLD AUTO: 279 K/UL (ref 150–400)
PLATELET MORPHOLOGY: ABNORMAL
POTASSIUM SERPL-SCNC: 2.8 MMOL/L (ref 3.5–5.1)
RBC # BLD AUTO: 2.83 M/UL (ref 4.6–6.2)
RBC MORPH BLD: NORMAL
SODIUM SERPL-SCNC: 138 MMOL/L (ref 136–145)
WBC # BLD AUTO: 16 K/UL (ref 4.5–11)

## 2021-11-16 PROCEDURE — 36415 COLL VENOUS BLD VENIPUNCTURE: CPT | Mod: ,,, | Performed by: CLINICAL MEDICAL LABORATORY

## 2021-11-16 PROCEDURE — 25000003 PHARM REV CODE 250: Performed by: STUDENT IN AN ORGANIZED HEALTH CARE EDUCATION/TRAINING PROGRAM

## 2021-11-16 PROCEDURE — 94761 N-INVAS EAR/PLS OXIMETRY MLT: CPT

## 2021-11-16 PROCEDURE — C9399 UNCLASSIFIED DRUGS OR BIOLOG: HCPCS | Performed by: NURSE PRACTITIONER

## 2021-11-16 PROCEDURE — 63600175 PHARM REV CODE 636 W HCPCS: Performed by: STUDENT IN AN ORGANIZED HEALTH CARE EDUCATION/TRAINING PROGRAM

## 2021-11-16 PROCEDURE — 20000000 HC ICU ROOM

## 2021-11-16 PROCEDURE — 25000003 PHARM REV CODE 250: Performed by: INTERNAL MEDICINE

## 2021-11-16 PROCEDURE — 99233 SBSQ HOSP IP/OBS HIGH 50: CPT | Mod: ,,, | Performed by: INTERNAL MEDICINE

## 2021-11-16 PROCEDURE — 25000003 PHARM REV CODE 250: Performed by: NURSE PRACTITIONER

## 2021-11-16 PROCEDURE — 36415 PR COLLECTION VENOUS BLOOD,VENIPUNCTURE: ICD-10-PCS | Mod: ,,, | Performed by: CLINICAL MEDICAL LABORATORY

## 2021-11-16 PROCEDURE — 99233 PR SUBSEQUENT HOSPITAL CARE,LEVL III: ICD-10-PCS | Mod: ,,, | Performed by: INTERNAL MEDICINE

## 2021-11-16 PROCEDURE — 63600175 PHARM REV CODE 636 W HCPCS: Performed by: NURSE PRACTITIONER

## 2021-11-16 PROCEDURE — 80048 BASIC METABOLIC PNL TOTAL CA: CPT | Performed by: NURSE PRACTITIONER

## 2021-11-16 PROCEDURE — 85025 COMPLETE CBC W/AUTO DIFF WBC: CPT | Performed by: STUDENT IN AN ORGANIZED HEALTH CARE EDUCATION/TRAINING PROGRAM

## 2021-11-16 PROCEDURE — 82962 GLUCOSE BLOOD TEST: CPT

## 2021-11-16 PROCEDURE — 27000221 HC OXYGEN, UP TO 24 HOURS

## 2021-11-16 PROCEDURE — 99900035 HC TECH TIME PER 15 MIN (STAT)

## 2021-11-16 RX ADMIN — FOLIC ACID 1 MG: 1 TABLET ORAL at 08:11

## 2021-11-16 RX ADMIN — LINEZOLID 600 MG: 600 INJECTION, SOLUTION INTRAVENOUS at 11:11

## 2021-11-16 RX ADMIN — POTASSIUM BICARBONATE 40 MEQ: 782 TABLET, EFFERVESCENT ORAL at 09:11

## 2021-11-16 RX ADMIN — HYDROCODONE BITARTRATE AND ACETAMINOPHEN 1 TABLET: 5; 325 TABLET ORAL at 06:11

## 2021-11-16 RX ADMIN — PIPERACILLIN AND TAZOBACTAM 4.5 G: 4; .5 INJECTION, POWDER, FOR SOLUTION INTRAVENOUS at 09:11

## 2021-11-16 RX ADMIN — CLINDAMYCIN IN 5 PERCENT DEXTROSE 900 MG: 18 INJECTION, SOLUTION INTRAVENOUS at 11:11

## 2021-11-16 RX ADMIN — METOPROLOL TARTRATE 25 MG: 25 TABLET, FILM COATED ORAL at 09:11

## 2021-11-16 RX ADMIN — INSULIN ASPART 10 UNITS: 100 INJECTION, SOLUTION INTRAVENOUS; SUBCUTANEOUS at 11:11

## 2021-11-16 RX ADMIN — CLINDAMYCIN IN 5 PERCENT DEXTROSE 900 MG: 18 INJECTION, SOLUTION INTRAVENOUS at 06:11

## 2021-11-16 RX ADMIN — INSULIN ASPART 2 UNITS: 100 INJECTION, SOLUTION INTRAVENOUS; SUBCUTANEOUS at 09:11

## 2021-11-16 RX ADMIN — PIPERACILLIN AND TAZOBACTAM 4.5 G: 4; .5 INJECTION, POWDER, FOR SOLUTION INTRAVENOUS at 02:11

## 2021-11-16 RX ADMIN — POTASSIUM BICARBONATE 20 MEQ: 782 TABLET, EFFERVESCENT ORAL at 08:11

## 2021-11-16 RX ADMIN — SODIUM BICARBONATE 650 MG: 650 TABLET ORAL at 09:11

## 2021-11-16 RX ADMIN — THIAMINE HCL TAB 100 MG 100 MG: 100 TAB at 08:11

## 2021-11-16 RX ADMIN — AMLODIPINE BESYLATE 10 MG: 10 TABLET ORAL at 08:11

## 2021-11-16 RX ADMIN — LINEZOLID 600 MG: 600 INJECTION, SOLUTION INTRAVENOUS at 12:11

## 2021-11-16 RX ADMIN — POTASSIUM BICARBONATE 40 MEQ: 782 TABLET, EFFERVESCENT ORAL at 02:11

## 2021-11-16 RX ADMIN — HYDROCODONE BITARTRATE AND ACETAMINOPHEN 1 TABLET: 5; 325 TABLET ORAL at 02:11

## 2021-11-16 RX ADMIN — INSULIN DETEMIR 35 UNITS: 100 INJECTION, SOLUTION SUBCUTANEOUS at 08:11

## 2021-11-16 RX ADMIN — INSULIN DETEMIR 10 UNITS: 100 INJECTION, SOLUTION SUBCUTANEOUS at 11:11

## 2021-11-16 RX ADMIN — PIPERACILLIN AND TAZOBACTAM 4.5 G: 4; .5 INJECTION, POWDER, FOR SOLUTION INTRAVENOUS at 05:11

## 2021-11-16 RX ADMIN — SODIUM BICARBONATE 650 MG: 650 TABLET ORAL at 02:11

## 2021-11-16 RX ADMIN — HYDROCODONE BITARTRATE AND ACETAMINOPHEN 1 TABLET: 5; 325 TABLET ORAL at 11:11

## 2021-11-16 RX ADMIN — CLINDAMYCIN IN 5 PERCENT DEXTROSE 900 MG: 18 INJECTION, SOLUTION INTRAVENOUS at 02:11

## 2021-11-16 RX ADMIN — ENOXAPARIN SODIUM 40 MG: 40 INJECTION SUBCUTANEOUS at 04:11

## 2021-11-16 RX ADMIN — INSULIN ASPART 8 UNITS: 100 INJECTION, SOLUTION INTRAVENOUS; SUBCUTANEOUS at 05:11

## 2021-11-16 RX ADMIN — METOPROLOL TARTRATE 25 MG: 25 TABLET, FILM COATED ORAL at 08:11

## 2021-11-16 RX ADMIN — SODIUM BICARBONATE 650 MG: 650 TABLET ORAL at 08:11

## 2021-11-16 RX ADMIN — INSULIN ASPART 10 UNITS: 100 INJECTION, SOLUTION INTRAVENOUS; SUBCUTANEOUS at 04:11

## 2021-11-17 LAB
ANION GAP SERPL CALCULATED.3IONS-SCNC: 17 MMOL/L (ref 7–16)
BACTERIA SPEC ANAEROBE CULT: ABNORMAL
BASOPHILS # BLD AUTO: 0.03 K/UL (ref 0–0.2)
BASOPHILS NFR BLD AUTO: 0.2 % (ref 0–1)
BUN SERPL-MCNC: 50 MG/DL (ref 7–18)
BUN/CREAT SERPL: 10 (ref 6–20)
CALCIUM SERPL-MCNC: 8.2 MG/DL (ref 8.5–10.1)
CHLORIDE SERPL-SCNC: 100 MMOL/L (ref 98–107)
CO2 SERPL-SCNC: 18 MMOL/L (ref 21–32)
CREAT SERPL-MCNC: 5.03 MG/DL (ref 0.7–1.3)
DIFFERENTIAL METHOD BLD: ABNORMAL
EOSINOPHIL # BLD AUTO: 0.12 K/UL (ref 0–0.5)
EOSINOPHIL NFR BLD AUTO: 0.7 % (ref 1–4)
EOSINOPHIL NFR BLD MANUAL: 2 % (ref 1–4)
ERYTHROCYTE [DISTWIDTH] IN BLOOD BY AUTOMATED COUNT: 13.4 % (ref 11.5–14.5)
GLUCOSE SERPL-MCNC: 174 MG/DL (ref 74–106)
GLUCOSE SERPL-MCNC: 227 MG/DL (ref 70–105)
GLUCOSE SERPL-MCNC: 253 MG/DL (ref 70–105)
GLUCOSE SERPL-MCNC: 262 MG/DL (ref 70–105)
GLUCOSE SERPL-MCNC: 287 MG/DL (ref 70–105)
HCT VFR BLD AUTO: 22.3 % (ref 40–54)
HGB BLD-MCNC: 7.8 G/DL (ref 13.5–18)
IMM GRANULOCYTES # BLD AUTO: 0.87 K/UL (ref 0–0.04)
IMM GRANULOCYTES NFR BLD: 5.2 % (ref 0–0.4)
LYMPHOCYTES # BLD AUTO: 1.44 K/UL (ref 1–4.8)
LYMPHOCYTES NFR BLD AUTO: 8.6 % (ref 27–41)
LYMPHOCYTES NFR BLD MANUAL: 6 % (ref 27–41)
MCH RBC QN AUTO: 30.2 PG (ref 27–31)
MCHC RBC AUTO-ENTMCNC: 35 G/DL (ref 32–36)
MCV RBC AUTO: 86.4 FL (ref 80–96)
MONOCYTES # BLD AUTO: 1.71 K/UL (ref 0–0.8)
MONOCYTES NFR BLD AUTO: 10.2 % (ref 2–6)
MONOCYTES NFR BLD MANUAL: 13 % (ref 2–6)
MPC BLD CALC-MCNC: 10.1 FL (ref 9.4–12.4)
NEUTROPHILS # BLD AUTO: 12.52 K/UL (ref 1.8–7.7)
NEUTROPHILS NFR BLD AUTO: 75.1 % (ref 53–65)
NEUTS BAND NFR BLD MANUAL: 4 % (ref 1–5)
NEUTS SEG NFR BLD MANUAL: 75 % (ref 50–62)
NRBC # BLD AUTO: 0.04 X10E3/UL
NRBC, AUTO (.00): 0.2 %
PLATELET # BLD AUTO: 274 K/UL (ref 150–400)
PLATELET MORPHOLOGY: ABNORMAL
POTASSIUM SERPL-SCNC: 2.6 MMOL/L (ref 3.5–5.1)
RBC # BLD AUTO: 2.58 M/UL (ref 4.6–6.2)
RBC MORPH BLD: NORMAL
SODIUM SERPL-SCNC: 132 MMOL/L (ref 136–145)
WBC # BLD AUTO: 16.69 K/UL (ref 4.5–11)

## 2021-11-17 PROCEDURE — 80048 BASIC METABOLIC PNL TOTAL CA: CPT | Performed by: NURSE PRACTITIONER

## 2021-11-17 PROCEDURE — 25000003 PHARM REV CODE 250: Performed by: NURSE PRACTITIONER

## 2021-11-17 PROCEDURE — 25000003 PHARM REV CODE 250: Performed by: STUDENT IN AN ORGANIZED HEALTH CARE EDUCATION/TRAINING PROGRAM

## 2021-11-17 PROCEDURE — 36415 COLL VENOUS BLD VENIPUNCTURE: CPT | Performed by: NURSE PRACTITIONER

## 2021-11-17 PROCEDURE — 63600175 PHARM REV CODE 636 W HCPCS: Performed by: NURSE PRACTITIONER

## 2021-11-17 PROCEDURE — 63600175 PHARM REV CODE 636 W HCPCS: Performed by: STUDENT IN AN ORGANIZED HEALTH CARE EDUCATION/TRAINING PROGRAM

## 2021-11-17 PROCEDURE — 85025 COMPLETE CBC W/AUTO DIFF WBC: CPT | Performed by: STUDENT IN AN ORGANIZED HEALTH CARE EDUCATION/TRAINING PROGRAM

## 2021-11-17 PROCEDURE — 99900035 HC TECH TIME PER 15 MIN (STAT)

## 2021-11-17 PROCEDURE — 82962 GLUCOSE BLOOD TEST: CPT

## 2021-11-17 PROCEDURE — C9399 UNCLASSIFIED DRUGS OR BIOLOG: HCPCS | Performed by: NURSE PRACTITIONER

## 2021-11-17 PROCEDURE — 99233 PR SUBSEQUENT HOSPITAL CARE,LEVL III: ICD-10-PCS | Mod: ,,, | Performed by: INTERNAL MEDICINE

## 2021-11-17 PROCEDURE — 94761 N-INVAS EAR/PLS OXIMETRY MLT: CPT

## 2021-11-17 PROCEDURE — 25000003 PHARM REV CODE 250: Performed by: INTERNAL MEDICINE

## 2021-11-17 PROCEDURE — 97163 PT EVAL HIGH COMPLEX 45 MIN: CPT

## 2021-11-17 PROCEDURE — 63600175 PHARM REV CODE 636 W HCPCS: Performed by: INTERNAL MEDICINE

## 2021-11-17 PROCEDURE — 20000000 HC ICU ROOM

## 2021-11-17 PROCEDURE — 25000003 PHARM REV CODE 250: Performed by: HOSPITALIST

## 2021-11-17 PROCEDURE — 99233 SBSQ HOSP IP/OBS HIGH 50: CPT | Mod: ,,, | Performed by: INTERNAL MEDICINE

## 2021-11-17 PROCEDURE — 27000221 HC OXYGEN, UP TO 24 HOURS

## 2021-11-17 RX ORDER — SODIUM CHLORIDE AND POTASSIUM CHLORIDE 150; 450 MG/100ML; MG/100ML
INJECTION, SOLUTION INTRAVENOUS CONTINUOUS
Status: DISCONTINUED | OUTPATIENT
Start: 2021-11-17 | End: 2021-11-18

## 2021-11-17 RX ORDER — MAGNESIUM SULFATE HEPTAHYDRATE 40 MG/ML
2 INJECTION, SOLUTION INTRAVENOUS ONCE
Status: COMPLETED | OUTPATIENT
Start: 2021-11-17 | End: 2021-11-17

## 2021-11-17 RX ORDER — ONDANSETRON 2 MG/ML
8 INJECTION INTRAMUSCULAR; INTRAVENOUS EVERY 6 HOURS PRN
Status: DISCONTINUED | OUTPATIENT
Start: 2021-11-17 | End: 2021-12-10 | Stop reason: HOSPADM

## 2021-11-17 RX ORDER — ENOXAPARIN SODIUM 100 MG/ML
30 INJECTION SUBCUTANEOUS EVERY 24 HOURS
Status: DISCONTINUED | OUTPATIENT
Start: 2021-11-17 | End: 2021-11-29

## 2021-11-17 RX ORDER — GUAIFENESIN/DEXTROMETHORPHAN 100-10MG/5
10 SYRUP ORAL EVERY 6 HOURS PRN
Status: DISCONTINUED | OUTPATIENT
Start: 2021-11-17 | End: 2021-12-10 | Stop reason: HOSPADM

## 2021-11-17 RX ORDER — TRAZODONE HYDROCHLORIDE 50 MG/1
50 TABLET ORAL NIGHTLY PRN
Status: DISCONTINUED | OUTPATIENT
Start: 2021-11-17 | End: 2021-12-10 | Stop reason: HOSPADM

## 2021-11-17 RX ORDER — POTASSIUM CHLORIDE 20 MEQ/1
40 TABLET, EXTENDED RELEASE ORAL EVERY 4 HOURS
Status: COMPLETED | OUTPATIENT
Start: 2021-11-17 | End: 2021-11-17

## 2021-11-17 RX ORDER — ACETAMINOPHEN 500 MG
1000 TABLET ORAL EVERY 6 HOURS PRN
Status: DISCONTINUED | OUTPATIENT
Start: 2021-11-17 | End: 2021-12-10 | Stop reason: HOSPADM

## 2021-11-17 RX ORDER — BISACODYL 5 MG
10 TABLET, DELAYED RELEASE (ENTERIC COATED) ORAL DAILY PRN
Status: DISCONTINUED | OUTPATIENT
Start: 2021-11-17 | End: 2021-12-10 | Stop reason: HOSPADM

## 2021-11-17 RX ORDER — SIMETHICONE 80 MG
1 TABLET,CHEWABLE ORAL 3 TIMES DAILY PRN
Status: DISCONTINUED | OUTPATIENT
Start: 2021-11-17 | End: 2021-12-10 | Stop reason: HOSPADM

## 2021-11-17 RX ADMIN — POTASSIUM CHLORIDE 40 MEQ: 1500 TABLET, EXTENDED RELEASE ORAL at 09:11

## 2021-11-17 RX ADMIN — CLINDAMYCIN IN 5 PERCENT DEXTROSE 900 MG: 18 INJECTION, SOLUTION INTRAVENOUS at 10:11

## 2021-11-17 RX ADMIN — INSULIN ASPART 4 UNITS: 100 INJECTION, SOLUTION INTRAVENOUS; SUBCUTANEOUS at 05:11

## 2021-11-17 RX ADMIN — HYDROCODONE BITARTRATE AND ACETAMINOPHEN 1 TABLET: 5; 325 TABLET ORAL at 11:11

## 2021-11-17 RX ADMIN — METOPROLOL TARTRATE 25 MG: 25 TABLET, FILM COATED ORAL at 08:11

## 2021-11-17 RX ADMIN — INSULIN ASPART 3 UNITS: 100 INJECTION, SOLUTION INTRAVENOUS; SUBCUTANEOUS at 09:11

## 2021-11-17 RX ADMIN — CLINDAMYCIN IN 5 PERCENT DEXTROSE 900 MG: 18 INJECTION, SOLUTION INTRAVENOUS at 06:11

## 2021-11-17 RX ADMIN — INSULIN DETEMIR 45 UNITS: 100 INJECTION, SOLUTION SUBCUTANEOUS at 08:11

## 2021-11-17 RX ADMIN — METOPROLOL TARTRATE 25 MG: 25 TABLET, FILM COATED ORAL at 09:11

## 2021-11-17 RX ADMIN — FOLIC ACID 1 MG: 1 TABLET ORAL at 08:11

## 2021-11-17 RX ADMIN — ACETAMINOPHEN 1000 MG: 500 TABLET ORAL at 10:11

## 2021-11-17 RX ADMIN — INSULIN ASPART 6 UNITS: 100 INJECTION, SOLUTION INTRAVENOUS; SUBCUTANEOUS at 05:11

## 2021-11-17 RX ADMIN — MAGNESIUM SULFATE IN WATER 2 G: 40 INJECTION, SOLUTION INTRAVENOUS at 10:11

## 2021-11-17 RX ADMIN — HYDROCODONE BITARTRATE AND ACETAMINOPHEN 1 TABLET: 5; 325 TABLET ORAL at 05:11

## 2021-11-17 RX ADMIN — POTASSIUM CHLORIDE AND SODIUM CHLORIDE: 450; 150 INJECTION, SOLUTION INTRAVENOUS at 10:11

## 2021-11-17 RX ADMIN — INSULIN ASPART 6 UNITS: 100 INJECTION, SOLUTION INTRAVENOUS; SUBCUTANEOUS at 11:11

## 2021-11-17 RX ADMIN — PIPERACILLIN AND TAZOBACTAM 4.5 G: 4; .5 INJECTION, POWDER, FOR SOLUTION INTRAVENOUS at 09:11

## 2021-11-17 RX ADMIN — PIPERACILLIN AND TAZOBACTAM 4.5 G: 4; .5 INJECTION, POWDER, FOR SOLUTION INTRAVENOUS at 05:11

## 2021-11-17 RX ADMIN — POTASSIUM BICARBONATE 40 MEQ: 782 TABLET, EFFERVESCENT ORAL at 02:11

## 2021-11-17 RX ADMIN — CLINDAMYCIN IN 5 PERCENT DEXTROSE 900 MG: 18 INJECTION, SOLUTION INTRAVENOUS at 02:11

## 2021-11-17 RX ADMIN — THIAMINE HCL TAB 100 MG 100 MG: 100 TAB at 08:11

## 2021-11-17 RX ADMIN — POTASSIUM CHLORIDE AND SODIUM CHLORIDE: 450; 150 INJECTION, SOLUTION INTRAVENOUS at 09:11

## 2021-11-17 RX ADMIN — ENOXAPARIN SODIUM 30 MG: 30 INJECTION SUBCUTANEOUS at 05:11

## 2021-11-17 RX ADMIN — POTASSIUM CHLORIDE 40 MEQ: 1500 TABLET, EXTENDED RELEASE ORAL at 02:11

## 2021-11-17 RX ADMIN — LINEZOLID 600 MG: 600 INJECTION, SOLUTION INTRAVENOUS at 11:11

## 2021-11-17 RX ADMIN — POTASSIUM BICARBONATE 40 MEQ: 782 TABLET, EFFERVESCENT ORAL at 08:11

## 2021-11-17 RX ADMIN — POTASSIUM CHLORIDE AND SODIUM CHLORIDE: 450; 150 INJECTION, SOLUTION INTRAVENOUS at 03:11

## 2021-11-17 RX ADMIN — SODIUM BICARBONATE 650 MG: 650 TABLET ORAL at 02:11

## 2021-11-17 RX ADMIN — TRAZODONE HYDROCHLORIDE 50 MG: 50 TABLET ORAL at 10:11

## 2021-11-17 RX ADMIN — AMLODIPINE BESYLATE 10 MG: 10 TABLET ORAL at 08:11

## 2021-11-17 RX ADMIN — POTASSIUM CHLORIDE 40 MEQ: 1500 TABLET, EXTENDED RELEASE ORAL at 05:11

## 2021-11-17 RX ADMIN — SODIUM BICARBONATE 650 MG: 650 TABLET ORAL at 08:11

## 2021-11-17 RX ADMIN — PIPERACILLIN AND TAZOBACTAM 4.5 G: 4; .5 INJECTION, POWDER, FOR SOLUTION INTRAVENOUS at 02:11

## 2021-11-17 RX ADMIN — SODIUM BICARBONATE 650 MG: 650 TABLET ORAL at 09:11

## 2021-11-18 ENCOUNTER — ANESTHESIA (OUTPATIENT)
Dept: SURGERY | Facility: HOSPITAL | Age: 46
DRG: 500 | End: 2021-11-18
Payer: COMMERCIAL

## 2021-11-18 ENCOUNTER — ANESTHESIA EVENT (OUTPATIENT)
Dept: SURGERY | Facility: HOSPITAL | Age: 46
DRG: 500 | End: 2021-11-18
Payer: COMMERCIAL

## 2021-11-18 LAB
ANION GAP SERPL CALCULATED.3IONS-SCNC: 19 MMOL/L (ref 7–16)
BACTERIA BLD CULT: NORMAL
BACTERIA BLD CULT: NORMAL
BASOPHILS # BLD AUTO: 0.05 K/UL (ref 0–0.2)
BASOPHILS NFR BLD AUTO: 0.2 % (ref 0–1)
BUN SERPL-MCNC: 55 MG/DL (ref 7–18)
BUN/CREAT SERPL: 11 (ref 6–20)
CALCIUM SERPL-MCNC: 8.2 MG/DL (ref 8.5–10.1)
CHLORIDE SERPL-SCNC: 99 MMOL/L (ref 98–107)
CO2 SERPL-SCNC: 16 MMOL/L (ref 21–32)
CREAT SERPL-MCNC: 5.11 MG/DL (ref 0.7–1.3)
DIFFERENTIAL METHOD BLD: ABNORMAL
EOSINOPHIL # BLD AUTO: 0.22 K/UL (ref 0–0.5)
EOSINOPHIL NFR BLD AUTO: 1 % (ref 1–4)
EOSINOPHIL NFR BLD MANUAL: 1 % (ref 1–4)
ERYTHROCYTE [DISTWIDTH] IN BLOOD BY AUTOMATED COUNT: 13.7 % (ref 11.5–14.5)
ESTROGEN SERPL-MCNC: NORMAL PG/ML
GLUCOSE SERPL-MCNC: 194 MG/DL (ref 74–106)
GLUCOSE SERPL-MCNC: 231 MG/DL (ref 70–105)
GLUCOSE SERPL-MCNC: 241 MG/DL (ref 70–105)
GLUCOSE SERPL-MCNC: 300 MG/DL (ref 70–105)
GLUCOSE SERPL-MCNC: 328 MG/DL (ref 70–105)
GLUCOSE SERPL-MCNC: 345 MG/DL (ref 70–105)
HCT VFR BLD AUTO: 21.5 % (ref 40–54)
HGB BLD-MCNC: 7.5 G/DL (ref 13.5–18)
IMM GRANULOCYTES # BLD AUTO: 2.48 K/UL (ref 0–0.04)
IMM GRANULOCYTES NFR BLD: 11.6 % (ref 0–0.4)
LAB AP GROSS DESCRIPTION: NORMAL
LAB AP LABORATORY NOTES: NORMAL
LYMPHOCYTES # BLD AUTO: 2.33 K/UL (ref 1–4.8)
LYMPHOCYTES NFR BLD AUTO: 10.9 % (ref 27–41)
LYMPHOCYTES NFR BLD MANUAL: 11 % (ref 27–41)
MAGNESIUM SERPL-MCNC: 1.8 MG/DL (ref 1.7–2.3)
MCH RBC QN AUTO: 30.5 PG (ref 27–31)
MCHC RBC AUTO-ENTMCNC: 34.9 G/DL (ref 32–36)
MCV RBC AUTO: 87.4 FL (ref 80–96)
METAMYELOCYTES NFR BLD MANUAL: 6 %
MONOCYTES # BLD AUTO: 2.31 K/UL (ref 0–0.8)
MONOCYTES NFR BLD AUTO: 10.8 % (ref 2–6)
MONOCYTES NFR BLD MANUAL: 10 % (ref 2–6)
MPC BLD CALC-MCNC: 10 FL (ref 9.4–12.4)
MYELOCYTES NFR BLD MANUAL: 6 %
NEUTROPHILS # BLD AUTO: 14.05 K/UL (ref 1.8–7.7)
NEUTROPHILS NFR BLD AUTO: 65.5 % (ref 53–65)
NEUTS BAND NFR BLD MANUAL: 8 % (ref 1–5)
NEUTS SEG NFR BLD MANUAL: 58 % (ref 50–62)
NRBC # BLD AUTO: 0.05 X10E3/UL
NRBC, AUTO (.00): 0.2 %
PLATELET # BLD AUTO: 320 K/UL (ref 150–400)
PLATELET MORPHOLOGY: ABNORMAL
POTASSIUM SERPL-SCNC: 3.8 MMOL/L (ref 3.5–5.1)
RBC # BLD AUTO: 2.46 M/UL (ref 4.6–6.2)
RBC MORPH BLD: NORMAL
SODIUM SERPL-SCNC: 130 MMOL/L (ref 136–145)
T3RU NFR SERPL: NORMAL %
WBC # BLD AUTO: 21.44 K/UL (ref 4.5–11)

## 2021-11-18 PROCEDURE — 27000221 HC OXYGEN, UP TO 24 HOURS

## 2021-11-18 PROCEDURE — 63600175 PHARM REV CODE 636 W HCPCS: Performed by: STUDENT IN AN ORGANIZED HEALTH CARE EDUCATION/TRAINING PROGRAM

## 2021-11-18 PROCEDURE — C1750 CATH, HEMODIALYSIS,LONG-TERM: HCPCS | Performed by: STUDENT IN AN ORGANIZED HEALTH CARE EDUCATION/TRAINING PROGRAM

## 2021-11-18 PROCEDURE — 25000003 PHARM REV CODE 250: Performed by: STUDENT IN AN ORGANIZED HEALTH CARE EDUCATION/TRAINING PROGRAM

## 2021-11-18 PROCEDURE — D9220A PRA ANESTHESIA: Mod: ANES,,, | Performed by: ANESTHESIOLOGY

## 2021-11-18 PROCEDURE — D9220A PRA ANESTHESIA: Mod: CRNA,,, | Performed by: NURSE ANESTHETIST, CERTIFIED REGISTERED

## 2021-11-18 PROCEDURE — 99900035 HC TECH TIME PER 15 MIN (STAT)

## 2021-11-18 PROCEDURE — 25000003 PHARM REV CODE 250: Performed by: NURSE PRACTITIONER

## 2021-11-18 PROCEDURE — 63600175 PHARM REV CODE 636 W HCPCS: Performed by: HOSPITALIST

## 2021-11-18 PROCEDURE — 99233 PR SUBSEQUENT HOSPITAL CARE,LEVL III: ICD-10-PCS | Mod: ,,, | Performed by: INTERNAL MEDICINE

## 2021-11-18 PROCEDURE — 63600175 PHARM REV CODE 636 W HCPCS: Performed by: NURSE ANESTHETIST, CERTIFIED REGISTERED

## 2021-11-18 PROCEDURE — 82962 GLUCOSE BLOOD TEST: CPT

## 2021-11-18 PROCEDURE — 63600175 PHARM REV CODE 636 W HCPCS: Performed by: NURSE PRACTITIONER

## 2021-11-18 PROCEDURE — 80048 BASIC METABOLIC PNL TOTAL CA: CPT | Performed by: NURSE PRACTITIONER

## 2021-11-18 PROCEDURE — 27000655: Performed by: ANESTHESIOLOGY

## 2021-11-18 PROCEDURE — C9399 UNCLASSIFIED DRUGS OR BIOLOG: HCPCS | Performed by: STUDENT IN AN ORGANIZED HEALTH CARE EDUCATION/TRAINING PROGRAM

## 2021-11-18 PROCEDURE — 27000165 HC TUBE, ETT CUFFED: Performed by: ANESTHESIOLOGY

## 2021-11-18 PROCEDURE — 11006 DBRDMT SKIN XTRNL GENT PER: CPT | Mod: 79,XU,, | Performed by: STUDENT IN AN ORGANIZED HEALTH CARE EDUCATION/TRAINING PROGRAM

## 2021-11-18 PROCEDURE — 36558 INSERT TUNNELED CV CATH: CPT | Mod: 79,RT,, | Performed by: STUDENT IN AN ORGANIZED HEALTH CARE EDUCATION/TRAINING PROGRAM

## 2021-11-18 PROCEDURE — 27000716 HC OXISENSOR PROBE, ANY SIZE: Performed by: ANESTHESIOLOGY

## 2021-11-18 PROCEDURE — 36558 PR INSERT TUNNELED CV CATH W/O PORT OR PUMP: ICD-10-PCS | Mod: 79,RT,, | Performed by: STUDENT IN AN ORGANIZED HEALTH CARE EDUCATION/TRAINING PROGRAM

## 2021-11-18 PROCEDURE — 77001 CHG FLUOROGUIDE CNTRL VEN ACCESS,PLACE,REPLACE,REMOVE: ICD-10-PCS | Mod: 26,,, | Performed by: STUDENT IN AN ORGANIZED HEALTH CARE EDUCATION/TRAINING PROGRAM

## 2021-11-18 PROCEDURE — 85025 COMPLETE CBC W/AUTO DIFF WBC: CPT | Performed by: STUDENT IN AN ORGANIZED HEALTH CARE EDUCATION/TRAINING PROGRAM

## 2021-11-18 PROCEDURE — 36000706: Performed by: STUDENT IN AN ORGANIZED HEALTH CARE EDUCATION/TRAINING PROGRAM

## 2021-11-18 PROCEDURE — 27201423 OPTIME MED/SURG SUP & DEVICES STERILE SUPPLY: Performed by: STUDENT IN AN ORGANIZED HEALTH CARE EDUCATION/TRAINING PROGRAM

## 2021-11-18 PROCEDURE — 36000707: Performed by: STUDENT IN AN ORGANIZED HEALTH CARE EDUCATION/TRAINING PROGRAM

## 2021-11-18 PROCEDURE — 37000008 HC ANESTHESIA 1ST 15 MINUTES: Performed by: STUDENT IN AN ORGANIZED HEALTH CARE EDUCATION/TRAINING PROGRAM

## 2021-11-18 PROCEDURE — 36415 COLL VENOUS BLD VENIPUNCTURE: CPT | Performed by: STUDENT IN AN ORGANIZED HEALTH CARE EDUCATION/TRAINING PROGRAM

## 2021-11-18 PROCEDURE — 37000009 HC ANESTHESIA EA ADD 15 MINS: Performed by: STUDENT IN AN ORGANIZED HEALTH CARE EDUCATION/TRAINING PROGRAM

## 2021-11-18 PROCEDURE — D9220A PRA ANESTHESIA: ICD-10-PCS | Mod: ANES,,, | Performed by: ANESTHESIOLOGY

## 2021-11-18 PROCEDURE — 25000003 PHARM REV CODE 250: Performed by: NURSE ANESTHETIST, CERTIFIED REGISTERED

## 2021-11-18 PROCEDURE — 77001 FLUOROGUIDE FOR VEIN DEVICE: CPT | Mod: 26,,, | Performed by: STUDENT IN AN ORGANIZED HEALTH CARE EDUCATION/TRAINING PROGRAM

## 2021-11-18 PROCEDURE — 96372 THER/PROPH/DIAG INJ SC/IM: CPT

## 2021-11-18 PROCEDURE — 20000000 HC ICU ROOM

## 2021-11-18 PROCEDURE — 11006 PR DEBRIDE NECROT SKIN/ TISS, GENIT,PERI, ADB WALL: ICD-10-PCS | Mod: 79,XU,, | Performed by: STUDENT IN AN ORGANIZED HEALTH CARE EDUCATION/TRAINING PROGRAM

## 2021-11-18 PROCEDURE — D9220A PRA ANESTHESIA: ICD-10-PCS | Mod: CRNA,,, | Performed by: NURSE ANESTHETIST, CERTIFIED REGISTERED

## 2021-11-18 PROCEDURE — 27000689 HC BLADE LARYNGOSCOPE ANY SIZE: Performed by: ANESTHESIOLOGY

## 2021-11-18 PROCEDURE — 94761 N-INVAS EAR/PLS OXIMETRY MLT: CPT

## 2021-11-18 PROCEDURE — 27000260 *HC AIRWAY ORAL: Performed by: ANESTHESIOLOGY

## 2021-11-18 PROCEDURE — 99233 SBSQ HOSP IP/OBS HIGH 50: CPT | Mod: ,,, | Performed by: INTERNAL MEDICINE

## 2021-11-18 PROCEDURE — 83735 ASSAY OF MAGNESIUM: CPT | Performed by: NURSE PRACTITIONER

## 2021-11-18 RX ORDER — PHENYLEPHRINE HYDROCHLORIDE 10 MG/ML
INJECTION INTRAVENOUS
Status: DISCONTINUED | OUTPATIENT
Start: 2021-11-18 | End: 2021-11-18

## 2021-11-18 RX ORDER — SODIUM CHLORIDE 9 MG/ML
INJECTION, SOLUTION INTRAVENOUS CONTINUOUS
Status: DISCONTINUED | OUTPATIENT
Start: 2021-11-18 | End: 2021-11-18

## 2021-11-18 RX ORDER — FENTANYL CITRATE 50 UG/ML
INJECTION, SOLUTION INTRAMUSCULAR; INTRAVENOUS
Status: DISCONTINUED | OUTPATIENT
Start: 2021-11-18 | End: 2021-11-18

## 2021-11-18 RX ORDER — ROCURONIUM BROMIDE 10 MG/ML
INJECTION, SOLUTION INTRAVENOUS
Status: DISCONTINUED | OUTPATIENT
Start: 2021-11-18 | End: 2021-11-18

## 2021-11-18 RX ORDER — LIDOCAINE HYDROCHLORIDE 10 MG/ML
INJECTION INFILTRATION; PERINEURAL
Status: DISCONTINUED | OUTPATIENT
Start: 2021-11-18 | End: 2021-11-18 | Stop reason: HOSPADM

## 2021-11-18 RX ORDER — PROPOFOL 10 MG/ML
VIAL (ML) INTRAVENOUS
Status: DISCONTINUED | OUTPATIENT
Start: 2021-11-18 | End: 2021-11-18

## 2021-11-18 RX ORDER — LIDOCAINE HYDROCHLORIDE 20 MG/ML
INJECTION, SOLUTION EPIDURAL; INFILTRATION; INTRACAUDAL; PERINEURAL
Status: DISCONTINUED | OUTPATIENT
Start: 2021-11-18 | End: 2021-11-18

## 2021-11-18 RX ADMIN — CLINDAMYCIN IN 5 PERCENT DEXTROSE 900 MG: 18 INJECTION, SOLUTION INTRAVENOUS at 02:11

## 2021-11-18 RX ADMIN — LINEZOLID 600 MG: 600 INJECTION, SOLUTION INTRAVENOUS at 12:11

## 2021-11-18 RX ADMIN — INSULIN ASPART 8 UNITS: 100 INJECTION, SOLUTION INTRAVENOUS; SUBCUTANEOUS at 04:11

## 2021-11-18 RX ADMIN — INSULIN ASPART 6 UNITS: 100 INJECTION, SOLUTION INTRAVENOUS; SUBCUTANEOUS at 12:11

## 2021-11-18 RX ADMIN — AMLODIPINE BESYLATE 10 MG: 10 TABLET ORAL at 10:11

## 2021-11-18 RX ADMIN — METOPROLOL TARTRATE 25 MG: 25 TABLET, FILM COATED ORAL at 10:11

## 2021-11-18 RX ADMIN — INSULIN ASPART 4 UNITS: 100 INJECTION, SOLUTION INTRAVENOUS; SUBCUTANEOUS at 09:11

## 2021-11-18 RX ADMIN — BISACODYL 10 MG: 5 TABLET, COATED ORAL at 09:11

## 2021-11-18 RX ADMIN — METOPROLOL TARTRATE 25 MG: 25 TABLET, FILM COATED ORAL at 09:11

## 2021-11-18 RX ADMIN — CLINDAMYCIN IN 5 PERCENT DEXTROSE 900 MG: 18 INJECTION, SOLUTION INTRAVENOUS at 10:11

## 2021-11-18 RX ADMIN — LIDOCAINE HYDROCHLORIDE 50 MG: 20 INJECTION, SOLUTION EPIDURAL; INFILTRATION; INTRACAUDAL; PERINEURAL at 08:11

## 2021-11-18 RX ADMIN — PIPERACILLIN AND TAZOBACTAM 4.5 G: 4; .5 INJECTION, POWDER, FOR SOLUTION INTRAVENOUS at 06:11

## 2021-11-18 RX ADMIN — SUGAMMADEX 200 MG: 100 INJECTION, SOLUTION INTRAVENOUS at 09:11

## 2021-11-18 RX ADMIN — SODIUM BICARBONATE 650 MG: 650 TABLET ORAL at 10:11

## 2021-11-18 RX ADMIN — INSULIN ASPART 4 UNITS: 100 INJECTION, SOLUTION INTRAVENOUS; SUBCUTANEOUS at 06:11

## 2021-11-18 RX ADMIN — CLINDAMYCIN IN 5 PERCENT DEXTROSE 900 MG: 18 INJECTION, SOLUTION INTRAVENOUS at 06:11

## 2021-11-18 RX ADMIN — POTASSIUM CHLORIDE AND SODIUM CHLORIDE: 450; 150 INJECTION, SOLUTION INTRAVENOUS at 04:11

## 2021-11-18 RX ADMIN — PIPERACILLIN AND TAZOBACTAM 4.5 G: 4; .5 INJECTION, POWDER, FOR SOLUTION INTRAVENOUS at 02:11

## 2021-11-18 RX ADMIN — PHENYLEPHRINE HYDROCHLORIDE 100 MCG: 10 INJECTION INTRAVENOUS at 08:11

## 2021-11-18 RX ADMIN — ENOXAPARIN SODIUM 30 MG: 30 INJECTION SUBCUTANEOUS at 05:11

## 2021-11-18 RX ADMIN — SODIUM CHLORIDE: 9 INJECTION, SOLUTION INTRAVENOUS at 08:11

## 2021-11-18 RX ADMIN — HYDROCODONE BITARTRATE AND ACETAMINOPHEN 1 TABLET: 5; 325 TABLET ORAL at 04:11

## 2021-11-18 RX ADMIN — THIAMINE HCL TAB 100 MG 100 MG: 100 TAB at 10:11

## 2021-11-18 RX ADMIN — FENTANYL CITRATE 50 MCG: 50 INJECTION INTRAMUSCULAR; INTRAVENOUS at 09:11

## 2021-11-18 RX ADMIN — PROPOFOL 100 MG: 10 INJECTION, EMULSION INTRAVENOUS at 09:11

## 2021-11-18 RX ADMIN — SODIUM BICARBONATE 650 MG: 650 TABLET ORAL at 02:11

## 2021-11-18 RX ADMIN — ROCURONIUM BROMIDE 50 MG: 10 INJECTION, SOLUTION INTRAVENOUS at 08:11

## 2021-11-18 RX ADMIN — FOLIC ACID 1 MG: 1 TABLET ORAL at 10:11

## 2021-11-18 RX ADMIN — PROPOFOL 150 MG: 10 INJECTION, EMULSION INTRAVENOUS at 08:11

## 2021-11-18 RX ADMIN — ONDANSETRON 4 MG: 2 INJECTION INTRAMUSCULAR; INTRAVENOUS at 08:11

## 2021-11-18 RX ADMIN — INSULIN DETEMIR 45 UNITS: 100 INJECTION, SOLUTION SUBCUTANEOUS at 10:11

## 2021-11-18 RX ADMIN — HYDROCODONE BITARTRATE AND ACETAMINOPHEN 1 TABLET: 5; 325 TABLET ORAL at 10:11

## 2021-11-18 RX ADMIN — FENTANYL CITRATE 50 MCG: 50 INJECTION INTRAMUSCULAR; INTRAVENOUS at 08:11

## 2021-11-18 RX ADMIN — PIPERACILLIN AND TAZOBACTAM 4.5 G: 4; .5 INJECTION, POWDER, FOR SOLUTION INTRAVENOUS at 09:11

## 2021-11-18 RX ADMIN — SODIUM BICARBONATE 650 MG: 650 TABLET ORAL at 09:11

## 2021-11-19 LAB
ANION GAP SERPL CALCULATED.3IONS-SCNC: 19 MMOL/L (ref 7–16)
BUN SERPL-MCNC: 63 MG/DL (ref 7–18)
BUN/CREAT SERPL: 12 (ref 6–20)
CALCIUM SERPL-MCNC: 8.4 MG/DL (ref 8.5–10.1)
CHLORIDE SERPL-SCNC: 98 MMOL/L (ref 98–107)
CO2 SERPL-SCNC: 14 MMOL/L (ref 21–32)
CREAT SERPL-MCNC: 5.24 MG/DL (ref 0.7–1.3)
GLUCOSE SERPL-MCNC: 201 MG/DL (ref 74–106)
GLUCOSE SERPL-MCNC: 248 MG/DL (ref 70–105)
GLUCOSE SERPL-MCNC: 333 MG/DL (ref 70–105)
GLUCOSE SERPL-MCNC: 340 MG/DL (ref 70–105)
GLUCOSE SERPL-MCNC: 397 MG/DL (ref 70–105)
MAGNESIUM SERPL-MCNC: 2 MG/DL (ref 1.7–2.3)
POTASSIUM SERPL-SCNC: 3.9 MMOL/L (ref 3.5–5.1)
SODIUM SERPL-SCNC: 127 MMOL/L (ref 136–145)

## 2021-11-19 PROCEDURE — 80048 BASIC METABOLIC PNL TOTAL CA: CPT | Performed by: STUDENT IN AN ORGANIZED HEALTH CARE EDUCATION/TRAINING PROGRAM

## 2021-11-19 PROCEDURE — 27000221 HC OXYGEN, UP TO 24 HOURS

## 2021-11-19 PROCEDURE — 63600175 PHARM REV CODE 636 W HCPCS: Performed by: INTERNAL MEDICINE

## 2021-11-19 PROCEDURE — 99233 PR SUBSEQUENT HOSPITAL CARE,LEVL III: ICD-10-PCS | Mod: ,,, | Performed by: INTERNAL MEDICINE

## 2021-11-19 PROCEDURE — 20000000 HC ICU ROOM

## 2021-11-19 PROCEDURE — 63600175 PHARM REV CODE 636 W HCPCS: Performed by: STUDENT IN AN ORGANIZED HEALTH CARE EDUCATION/TRAINING PROGRAM

## 2021-11-19 PROCEDURE — 97165 OT EVAL LOW COMPLEX 30 MIN: CPT

## 2021-11-19 PROCEDURE — 99233 SBSQ HOSP IP/OBS HIGH 50: CPT | Mod: ,,, | Performed by: INTERNAL MEDICINE

## 2021-11-19 PROCEDURE — 25000003 PHARM REV CODE 250: Performed by: NURSE PRACTITIONER

## 2021-11-19 PROCEDURE — 25000003 PHARM REV CODE 250: Performed by: STUDENT IN AN ORGANIZED HEALTH CARE EDUCATION/TRAINING PROGRAM

## 2021-11-19 PROCEDURE — 83735 ASSAY OF MAGNESIUM: CPT | Performed by: STUDENT IN AN ORGANIZED HEALTH CARE EDUCATION/TRAINING PROGRAM

## 2021-11-19 PROCEDURE — 94761 N-INVAS EAR/PLS OXIMETRY MLT: CPT

## 2021-11-19 PROCEDURE — 99900035 HC TECH TIME PER 15 MIN (STAT)

## 2021-11-19 PROCEDURE — C9399 UNCLASSIFIED DRUGS OR BIOLOG: HCPCS | Performed by: INTERNAL MEDICINE

## 2021-11-19 PROCEDURE — 82962 GLUCOSE BLOOD TEST: CPT

## 2021-11-19 PROCEDURE — 36415 COLL VENOUS BLD VENIPUNCTURE: CPT | Performed by: STUDENT IN AN ORGANIZED HEALTH CARE EDUCATION/TRAINING PROGRAM

## 2021-11-19 PROCEDURE — 63600175 PHARM REV CODE 636 W HCPCS: Performed by: NURSE PRACTITIONER

## 2021-11-19 RX ADMIN — INSULIN ASPART 8 UNITS: 100 INJECTION, SOLUTION INTRAVENOUS; SUBCUTANEOUS at 11:11

## 2021-11-19 RX ADMIN — INSULIN DETEMIR 30 UNITS: 100 INJECTION, SOLUTION SUBCUTANEOUS at 08:11

## 2021-11-19 RX ADMIN — PIPERACILLIN AND TAZOBACTAM 4.5 G: 4; .5 INJECTION, POWDER, FOR SOLUTION INTRAVENOUS at 02:11

## 2021-11-19 RX ADMIN — CLINDAMYCIN IN 5 PERCENT DEXTROSE 900 MG: 18 INJECTION, SOLUTION INTRAVENOUS at 06:11

## 2021-11-19 RX ADMIN — PIPERACILLIN AND TAZOBACTAM 4.5 G: 4; .5 INJECTION, POWDER, FOR SOLUTION INTRAVENOUS at 10:11

## 2021-11-19 RX ADMIN — CLINDAMYCIN IN 5 PERCENT DEXTROSE 900 MG: 18 INJECTION, SOLUTION INTRAVENOUS at 02:11

## 2021-11-19 RX ADMIN — METOPROLOL TARTRATE 25 MG: 25 TABLET, FILM COATED ORAL at 08:11

## 2021-11-19 RX ADMIN — HYDROCODONE BITARTRATE AND ACETAMINOPHEN 1 TABLET: 5; 325 TABLET ORAL at 08:11

## 2021-11-19 RX ADMIN — AMLODIPINE BESYLATE 10 MG: 10 TABLET ORAL at 08:11

## 2021-11-19 RX ADMIN — ENOXAPARIN SODIUM 30 MG: 30 INJECTION SUBCUTANEOUS at 04:11

## 2021-11-19 RX ADMIN — HYDROCODONE BITARTRATE AND ACETAMINOPHEN 1 TABLET: 5; 325 TABLET ORAL at 02:11

## 2021-11-19 RX ADMIN — SODIUM BICARBONATE 650 MG: 650 TABLET ORAL at 02:11

## 2021-11-19 RX ADMIN — FOLIC ACID 1 MG: 1 TABLET ORAL at 08:11

## 2021-11-19 RX ADMIN — SODIUM BICARBONATE 650 MG: 650 TABLET ORAL at 08:11

## 2021-11-19 RX ADMIN — TRAZODONE HYDROCHLORIDE 50 MG: 50 TABLET ORAL at 08:11

## 2021-11-19 RX ADMIN — CLINDAMYCIN IN 5 PERCENT DEXTROSE 900 MG: 18 INJECTION, SOLUTION INTRAVENOUS at 11:11

## 2021-11-19 RX ADMIN — ACETAMINOPHEN 1000 MG: 500 TABLET ORAL at 06:11

## 2021-11-19 RX ADMIN — INSULIN ASPART 10 UNITS: 100 INJECTION, SOLUTION INTRAVENOUS; SUBCUTANEOUS at 04:11

## 2021-11-19 RX ADMIN — PIPERACILLIN AND TAZOBACTAM 4.5 G: 4; .5 INJECTION, POWDER, FOR SOLUTION INTRAVENOUS at 06:11

## 2021-11-19 RX ADMIN — INSULIN ASPART 4 UNITS: 100 INJECTION, SOLUTION INTRAVENOUS; SUBCUTANEOUS at 06:11

## 2021-11-19 RX ADMIN — INSULIN ASPART 4 UNITS: 100 INJECTION, SOLUTION INTRAVENOUS; SUBCUTANEOUS at 08:11

## 2021-11-19 RX ADMIN — THIAMINE HCL TAB 100 MG 100 MG: 100 TAB at 08:11

## 2021-11-20 LAB
ANION GAP SERPL CALCULATED.3IONS-SCNC: 16 MMOL/L (ref 7–16)
BUN SERPL-MCNC: 65 MG/DL (ref 7–18)
BUN/CREAT SERPL: 12 (ref 6–20)
CALCIUM SERPL-MCNC: 8.6 MG/DL (ref 8.5–10.1)
CHLORIDE SERPL-SCNC: 100 MMOL/L (ref 98–107)
CO2 SERPL-SCNC: 19 MMOL/L (ref 21–32)
CREAT SERPL-MCNC: 5.37 MG/DL (ref 0.7–1.3)
GLUCOSE SERPL-MCNC: 245 MG/DL (ref 74–106)
GLUCOSE SERPL-MCNC: 261 MG/DL (ref 70–105)
GLUCOSE SERPL-MCNC: 281 MG/DL (ref 70–105)
GLUCOSE SERPL-MCNC: 288 MG/DL (ref 70–105)
GLUCOSE SERPL-MCNC: 301 MG/DL (ref 70–105)
MAGNESIUM SERPL-MCNC: 2.2 MG/DL (ref 1.7–2.3)
POTASSIUM SERPL-SCNC: 3.2 MMOL/L (ref 3.5–5.1)
SODIUM SERPL-SCNC: 132 MMOL/L (ref 136–145)

## 2021-11-20 PROCEDURE — 25000003 PHARM REV CODE 250: Performed by: NURSE PRACTITIONER

## 2021-11-20 PROCEDURE — 99233 SBSQ HOSP IP/OBS HIGH 50: CPT | Mod: ,,, | Performed by: INTERNAL MEDICINE

## 2021-11-20 PROCEDURE — 20000000 HC ICU ROOM

## 2021-11-20 PROCEDURE — 27000221 HC OXYGEN, UP TO 24 HOURS

## 2021-11-20 PROCEDURE — 63600175 PHARM REV CODE 636 W HCPCS: Performed by: INTERNAL MEDICINE

## 2021-11-20 PROCEDURE — 82962 GLUCOSE BLOOD TEST: CPT

## 2021-11-20 PROCEDURE — 25000003 PHARM REV CODE 250: Performed by: STUDENT IN AN ORGANIZED HEALTH CARE EDUCATION/TRAINING PROGRAM

## 2021-11-20 PROCEDURE — 63600175 PHARM REV CODE 636 W HCPCS: Performed by: STUDENT IN AN ORGANIZED HEALTH CARE EDUCATION/TRAINING PROGRAM

## 2021-11-20 PROCEDURE — 83735 ASSAY OF MAGNESIUM: CPT | Performed by: STUDENT IN AN ORGANIZED HEALTH CARE EDUCATION/TRAINING PROGRAM

## 2021-11-20 PROCEDURE — 99233 PR SUBSEQUENT HOSPITAL CARE,LEVL III: ICD-10-PCS | Mod: ,,, | Performed by: INTERNAL MEDICINE

## 2021-11-20 PROCEDURE — 63600175 PHARM REV CODE 636 W HCPCS: Performed by: NURSE PRACTITIONER

## 2021-11-20 PROCEDURE — C9399 UNCLASSIFIED DRUGS OR BIOLOG: HCPCS | Performed by: INTERNAL MEDICINE

## 2021-11-20 PROCEDURE — 80048 BASIC METABOLIC PNL TOTAL CA: CPT | Performed by: STUDENT IN AN ORGANIZED HEALTH CARE EDUCATION/TRAINING PROGRAM

## 2021-11-20 PROCEDURE — 36415 COLL VENOUS BLD VENIPUNCTURE: CPT | Performed by: STUDENT IN AN ORGANIZED HEALTH CARE EDUCATION/TRAINING PROGRAM

## 2021-11-20 RX ADMIN — METOPROLOL TARTRATE 25 MG: 25 TABLET, FILM COATED ORAL at 09:11

## 2021-11-20 RX ADMIN — HYDROCODONE BITARTRATE AND ACETAMINOPHEN 1 TABLET: 5; 325 TABLET ORAL at 02:11

## 2021-11-20 RX ADMIN — CLINDAMYCIN IN 5 PERCENT DEXTROSE 900 MG: 18 INJECTION, SOLUTION INTRAVENOUS at 11:11

## 2021-11-20 RX ADMIN — INSULIN ASPART 8 UNITS: 100 INJECTION, SOLUTION INTRAVENOUS; SUBCUTANEOUS at 05:11

## 2021-11-20 RX ADMIN — HYDROCODONE BITARTRATE AND ACETAMINOPHEN 1 TABLET: 5; 325 TABLET ORAL at 08:11

## 2021-11-20 RX ADMIN — THIAMINE HCL TAB 100 MG 100 MG: 100 TAB at 09:11

## 2021-11-20 RX ADMIN — SODIUM BICARBONATE 650 MG: 650 TABLET ORAL at 09:11

## 2021-11-20 RX ADMIN — TRAZODONE HYDROCHLORIDE 50 MG: 50 TABLET ORAL at 09:11

## 2021-11-20 RX ADMIN — INSULIN ASPART 6 UNITS: 100 INJECTION, SOLUTION INTRAVENOUS; SUBCUTANEOUS at 12:11

## 2021-11-20 RX ADMIN — HYDROCODONE BITARTRATE AND ACETAMINOPHEN 1 TABLET: 5; 325 TABLET ORAL at 09:11

## 2021-11-20 RX ADMIN — PIPERACILLIN AND TAZOBACTAM 4.5 G: 4; .5 INJECTION, POWDER, FOR SOLUTION INTRAVENOUS at 05:11

## 2021-11-20 RX ADMIN — INSULIN ASPART 3 UNITS: 100 INJECTION, SOLUTION INTRAVENOUS; SUBCUTANEOUS at 09:11

## 2021-11-20 RX ADMIN — PIPERACILLIN AND TAZOBACTAM 4.5 G: 4; .5 INJECTION, POWDER, FOR SOLUTION INTRAVENOUS at 09:11

## 2021-11-20 RX ADMIN — ENOXAPARIN SODIUM 30 MG: 30 INJECTION SUBCUTANEOUS at 05:11

## 2021-11-20 RX ADMIN — PIPERACILLIN AND TAZOBACTAM 4.5 G: 4; .5 INJECTION, POWDER, FOR SOLUTION INTRAVENOUS at 02:11

## 2021-11-20 RX ADMIN — INSULIN DETEMIR 30 UNITS: 100 INJECTION, SOLUTION SUBCUTANEOUS at 09:11

## 2021-11-20 RX ADMIN — SODIUM BICARBONATE 650 MG: 650 TABLET ORAL at 02:11

## 2021-11-20 RX ADMIN — CLINDAMYCIN IN 5 PERCENT DEXTROSE 900 MG: 18 INJECTION, SOLUTION INTRAVENOUS at 03:11

## 2021-11-20 RX ADMIN — INSULIN ASPART 6 UNITS: 100 INJECTION, SOLUTION INTRAVENOUS; SUBCUTANEOUS at 05:11

## 2021-11-20 RX ADMIN — AMLODIPINE BESYLATE 10 MG: 10 TABLET ORAL at 09:11

## 2021-11-20 RX ADMIN — FOLIC ACID 1 MG: 1 TABLET ORAL at 09:11

## 2021-11-20 RX ADMIN — CLINDAMYCIN IN 5 PERCENT DEXTROSE 900 MG: 18 INJECTION, SOLUTION INTRAVENOUS at 05:11

## 2021-11-21 LAB
GLUCOSE SERPL-MCNC: 125 MG/DL (ref 70–105)
GLUCOSE SERPL-MCNC: 306 MG/DL (ref 70–105)
GLUCOSE SERPL-MCNC: 314 MG/DL (ref 70–105)
GLUCOSE SERPL-MCNC: 413 MG/DL (ref 70–105)

## 2021-11-21 PROCEDURE — 25000003 PHARM REV CODE 250: Performed by: NURSE PRACTITIONER

## 2021-11-21 PROCEDURE — 25000003 PHARM REV CODE 250: Performed by: STUDENT IN AN ORGANIZED HEALTH CARE EDUCATION/TRAINING PROGRAM

## 2021-11-21 PROCEDURE — C9399 UNCLASSIFIED DRUGS OR BIOLOG: HCPCS | Performed by: INTERNAL MEDICINE

## 2021-11-21 PROCEDURE — 20000000 HC ICU ROOM

## 2021-11-21 PROCEDURE — 63600175 PHARM REV CODE 636 W HCPCS: Performed by: STUDENT IN AN ORGANIZED HEALTH CARE EDUCATION/TRAINING PROGRAM

## 2021-11-21 PROCEDURE — 94003 VENT MGMT INPAT SUBQ DAY: CPT

## 2021-11-21 PROCEDURE — 63600175 PHARM REV CODE 636 W HCPCS: Performed by: NURSE PRACTITIONER

## 2021-11-21 PROCEDURE — 63600175 PHARM REV CODE 636 W HCPCS: Performed by: INTERNAL MEDICINE

## 2021-11-21 PROCEDURE — 99233 PR SUBSEQUENT HOSPITAL CARE,LEVL III: ICD-10-PCS | Mod: ,,, | Performed by: INTERNAL MEDICINE

## 2021-11-21 PROCEDURE — 27000221 HC OXYGEN, UP TO 24 HOURS

## 2021-11-21 PROCEDURE — 94761 N-INVAS EAR/PLS OXIMETRY MLT: CPT

## 2021-11-21 PROCEDURE — 82962 GLUCOSE BLOOD TEST: CPT

## 2021-11-21 PROCEDURE — 99233 SBSQ HOSP IP/OBS HIGH 50: CPT | Mod: ,,, | Performed by: INTERNAL MEDICINE

## 2021-11-21 RX ADMIN — THIAMINE HCL TAB 100 MG 100 MG: 100 TAB at 08:11

## 2021-11-21 RX ADMIN — INSULIN ASPART 10 UNITS: 100 INJECTION, SOLUTION INTRAVENOUS; SUBCUTANEOUS at 04:11

## 2021-11-21 RX ADMIN — METOPROLOL TARTRATE 25 MG: 25 TABLET, FILM COATED ORAL at 09:11

## 2021-11-21 RX ADMIN — SODIUM BICARBONATE 650 MG: 650 TABLET ORAL at 09:11

## 2021-11-21 RX ADMIN — CLINDAMYCIN IN 5 PERCENT DEXTROSE 900 MG: 18 INJECTION, SOLUTION INTRAVENOUS at 05:11

## 2021-11-21 RX ADMIN — HYDROCODONE BITARTRATE AND ACETAMINOPHEN 1 TABLET: 5; 325 TABLET ORAL at 05:11

## 2021-11-21 RX ADMIN — AMLODIPINE BESYLATE 10 MG: 10 TABLET ORAL at 08:11

## 2021-11-21 RX ADMIN — PIPERACILLIN AND TAZOBACTAM 4.5 G: 4; .5 INJECTION, POWDER, FOR SOLUTION INTRAVENOUS at 11:11

## 2021-11-21 RX ADMIN — ENOXAPARIN SODIUM 30 MG: 30 INJECTION SUBCUTANEOUS at 04:11

## 2021-11-21 RX ADMIN — INSULIN ASPART 4 UNITS: 100 INJECTION, SOLUTION INTRAVENOUS; SUBCUTANEOUS at 09:11

## 2021-11-21 RX ADMIN — INSULIN ASPART 8 UNITS: 100 INJECTION, SOLUTION INTRAVENOUS; SUBCUTANEOUS at 11:11

## 2021-11-21 RX ADMIN — HYDROCODONE BITARTRATE AND ACETAMINOPHEN 1 TABLET: 5; 325 TABLET ORAL at 11:11

## 2021-11-21 RX ADMIN — INSULIN DETEMIR 30 UNITS: 100 INJECTION, SOLUTION SUBCUTANEOUS at 09:11

## 2021-11-21 RX ADMIN — FOLIC ACID 1 MG: 1 TABLET ORAL at 08:11

## 2021-11-21 RX ADMIN — METOPROLOL TARTRATE 25 MG: 25 TABLET, FILM COATED ORAL at 08:11

## 2021-11-21 RX ADMIN — PIPERACILLIN AND TAZOBACTAM 4.5 G: 4; .5 INJECTION, POWDER, FOR SOLUTION INTRAVENOUS at 05:11

## 2021-11-21 RX ADMIN — CLINDAMYCIN IN 5 PERCENT DEXTROSE 900 MG: 18 INJECTION, SOLUTION INTRAVENOUS at 11:11

## 2021-11-21 RX ADMIN — TRAZODONE HYDROCHLORIDE 50 MG: 50 TABLET ORAL at 11:11

## 2021-11-21 RX ADMIN — INSULIN DETEMIR 30 UNITS: 100 INJECTION, SOLUTION SUBCUTANEOUS at 08:11

## 2021-11-21 RX ADMIN — SODIUM BICARBONATE 650 MG: 650 TABLET ORAL at 08:11

## 2021-11-21 RX ADMIN — HYDROCODONE BITARTRATE AND ACETAMINOPHEN 1 TABLET: 5; 325 TABLET ORAL at 04:11

## 2021-11-21 RX ADMIN — PIPERACILLIN AND TAZOBACTAM 4.5 G: 4; .5 INJECTION, POWDER, FOR SOLUTION INTRAVENOUS at 02:11

## 2021-11-21 RX ADMIN — SODIUM BICARBONATE 650 MG: 650 TABLET ORAL at 02:11

## 2021-11-21 RX ADMIN — CLINDAMYCIN IN 5 PERCENT DEXTROSE 900 MG: 18 INJECTION, SOLUTION INTRAVENOUS at 02:11

## 2021-11-21 RX ADMIN — SIMETHICONE 80 MG: 80 TABLET, CHEWABLE ORAL at 09:11

## 2021-11-22 ENCOUNTER — ANESTHESIA (OUTPATIENT)
Dept: SURGERY | Facility: HOSPITAL | Age: 46
DRG: 500 | End: 2021-11-22
Payer: COMMERCIAL

## 2021-11-22 ENCOUNTER — ANESTHESIA EVENT (OUTPATIENT)
Dept: SURGERY | Facility: HOSPITAL | Age: 46
DRG: 500 | End: 2021-11-22
Payer: COMMERCIAL

## 2021-11-22 PROBLEM — D64.9 ANEMIA: Status: ACTIVE | Noted: 2021-11-22

## 2021-11-22 LAB
ANION GAP SERPL CALCULATED.3IONS-SCNC: 18 MMOL/L (ref 7–16)
BASOPHILS # BLD AUTO: 0.05 K/UL (ref 0–0.2)
BASOPHILS NFR BLD AUTO: 0.2 % (ref 0–1)
BUN SERPL-MCNC: 73 MG/DL (ref 7–18)
BUN/CREAT SERPL: 15 (ref 6–20)
CALCIUM SERPL-MCNC: 8.7 MG/DL (ref 8.5–10.1)
CHLORIDE SERPL-SCNC: 103 MMOL/L (ref 98–107)
CO2 SERPL-SCNC: 18 MMOL/L (ref 21–32)
CREAT SERPL-MCNC: 5.02 MG/DL (ref 0.7–1.3)
DIFFERENTIAL METHOD BLD: ABNORMAL
EOSINOPHIL # BLD AUTO: 0.22 K/UL (ref 0–0.5)
EOSINOPHIL NFR BLD AUTO: 0.9 % (ref 1–4)
ERYTHROCYTE [DISTWIDTH] IN BLOOD BY AUTOMATED COUNT: 14.6 % (ref 11.5–14.5)
GLUCOSE SERPL-MCNC: 118 MG/DL (ref 70–105)
GLUCOSE SERPL-MCNC: 145 MG/DL (ref 74–106)
GLUCOSE SERPL-MCNC: 177 MG/DL (ref 70–105)
GLUCOSE SERPL-MCNC: 405 MG/DL (ref 70–105)
GLUCOSE SERPL-MCNC: 427 MG/DL (ref 70–105)
HCT VFR BLD AUTO: 21.8 % (ref 40–54)
HGB BLD-MCNC: 7.2 G/DL (ref 13.5–18)
HYPOCHROMIA BLD QL SMEAR: ABNORMAL
IMM GRANULOCYTES # BLD AUTO: 1.2 K/UL (ref 0–0.04)
IMM GRANULOCYTES NFR BLD: 5.2 % (ref 0–0.4)
LYMPHOCYTES # BLD AUTO: 1.56 K/UL (ref 1–4.8)
LYMPHOCYTES NFR BLD AUTO: 6.7 % (ref 27–41)
LYMPHOCYTES NFR BLD MANUAL: 9 % (ref 27–41)
MCH RBC QN AUTO: 30.1 PG (ref 27–31)
MCHC RBC AUTO-ENTMCNC: 33 G/DL (ref 32–36)
MCV RBC AUTO: 91.2 FL (ref 80–96)
MONOCYTES # BLD AUTO: 1.83 K/UL (ref 0–0.8)
MONOCYTES NFR BLD AUTO: 7.9 % (ref 2–6)
MONOCYTES NFR BLD MANUAL: 9 % (ref 2–6)
MPC BLD CALC-MCNC: 9.3 FL (ref 9.4–12.4)
NEUTROPHILS # BLD AUTO: 18.38 K/UL (ref 1.8–7.7)
NEUTROPHILS NFR BLD AUTO: 79.1 % (ref 53–65)
NEUTS BAND NFR BLD MANUAL: 2 % (ref 1–5)
NEUTS SEG NFR BLD MANUAL: 80 % (ref 50–62)
NRBC # BLD AUTO: 0.03 X10E3/UL
NRBC BLD MANUAL-RTO: 1 /100 WBC
NRBC, AUTO (.00): 0.1 %
PLATELET # BLD AUTO: 448 K/UL (ref 150–400)
PLATELET MORPHOLOGY: ABNORMAL
POLYCHROMASIA BLD QL SMEAR: ABNORMAL
POTASSIUM SERPL-SCNC: 3 MMOL/L (ref 3.5–5.1)
RBC # BLD AUTO: 2.39 M/UL (ref 4.6–6.2)
SODIUM SERPL-SCNC: 136 MMOL/L (ref 136–145)
WBC # BLD AUTO: 23.24 K/UL (ref 4.5–11)

## 2021-11-22 PROCEDURE — 82962 GLUCOSE BLOOD TEST: CPT

## 2021-11-22 PROCEDURE — 25000003 PHARM REV CODE 250: Performed by: NURSE PRACTITIONER

## 2021-11-22 PROCEDURE — 37000008 HC ANESTHESIA 1ST 15 MINUTES: Performed by: SURGERY

## 2021-11-22 PROCEDURE — C9399 UNCLASSIFIED DRUGS OR BIOLOG: HCPCS | Performed by: INTERNAL MEDICINE

## 2021-11-22 PROCEDURE — 11043 DBRDMT MUSC&/FSCA 1ST 20/<: CPT | Mod: 79,,, | Performed by: SURGERY

## 2021-11-22 PROCEDURE — 37000009 HC ANESTHESIA EA ADD 15 MINS: Performed by: SURGERY

## 2021-11-22 PROCEDURE — 63600175 PHARM REV CODE 636 W HCPCS: Performed by: NURSE ANESTHETIST, CERTIFIED REGISTERED

## 2021-11-22 PROCEDURE — 63600175 PHARM REV CODE 636 W HCPCS: Performed by: NURSE PRACTITIONER

## 2021-11-22 PROCEDURE — 63600175 PHARM REV CODE 636 W HCPCS: Performed by: INTERNAL MEDICINE

## 2021-11-22 PROCEDURE — 63600175 PHARM REV CODE 636 W HCPCS: Performed by: STUDENT IN AN ORGANIZED HEALTH CARE EDUCATION/TRAINING PROGRAM

## 2021-11-22 PROCEDURE — 27000221 HC OXYGEN, UP TO 24 HOURS

## 2021-11-22 PROCEDURE — 99232 PR SUBSEQUENT HOSPITAL CARE,LEVL II: ICD-10-PCS | Mod: ,,, | Performed by: INTERNAL MEDICINE

## 2021-11-22 PROCEDURE — 80048 BASIC METABOLIC PNL TOTAL CA: CPT | Performed by: INTERNAL MEDICINE

## 2021-11-22 PROCEDURE — D9220A PRA ANESTHESIA: ICD-10-PCS | Mod: CRNA,,, | Performed by: NURSE ANESTHETIST, CERTIFIED REGISTERED

## 2021-11-22 PROCEDURE — D9220A PRA ANESTHESIA: Mod: CRNA,,, | Performed by: NURSE ANESTHETIST, CERTIFIED REGISTERED

## 2021-11-22 PROCEDURE — 25000003 PHARM REV CODE 250: Performed by: STUDENT IN AN ORGANIZED HEALTH CARE EDUCATION/TRAINING PROGRAM

## 2021-11-22 PROCEDURE — 99223 PR INITIAL HOSPITAL CARE,LEVL III: ICD-10-PCS | Mod: ,,, | Performed by: INTERNAL MEDICINE

## 2021-11-22 PROCEDURE — 36000706: Performed by: SURGERY

## 2021-11-22 PROCEDURE — 25000003 PHARM REV CODE 250: Performed by: INTERNAL MEDICINE

## 2021-11-22 PROCEDURE — 11043 PR DEBRIDEMENT, SKIN, SUB-Q TISSUE,MUSCLE,=<20 SQ CM: ICD-10-PCS | Mod: 79,,, | Performed by: SURGERY

## 2021-11-22 PROCEDURE — D9220A PRA ANESTHESIA: Mod: ANES,,, | Performed by: ANESTHESIOLOGY

## 2021-11-22 PROCEDURE — 20000000 HC ICU ROOM

## 2021-11-22 PROCEDURE — 36000707: Performed by: SURGERY

## 2021-11-22 PROCEDURE — 11046 DBRDMT MUSC&/FSCA EA ADDL: CPT | Mod: 79,,, | Performed by: SURGERY

## 2021-11-22 PROCEDURE — 27201423 OPTIME MED/SURG SUP & DEVICES STERILE SUPPLY: Performed by: SURGERY

## 2021-11-22 PROCEDURE — 25000003 PHARM REV CODE 250: Performed by: NURSE ANESTHETIST, CERTIFIED REGISTERED

## 2021-11-22 PROCEDURE — 99232 SBSQ HOSP IP/OBS MODERATE 35: CPT | Mod: ,,, | Performed by: INTERNAL MEDICINE

## 2021-11-22 PROCEDURE — 85025 COMPLETE CBC W/AUTO DIFF WBC: CPT | Performed by: INTERNAL MEDICINE

## 2021-11-22 PROCEDURE — D9220A PRA ANESTHESIA: ICD-10-PCS | Mod: ANES,,, | Performed by: ANESTHESIOLOGY

## 2021-11-22 PROCEDURE — 11046 PR DEB MUSC/FASCIA ADD-ON: ICD-10-PCS | Mod: 79,,, | Performed by: SURGERY

## 2021-11-22 PROCEDURE — 94761 N-INVAS EAR/PLS OXIMETRY MLT: CPT

## 2021-11-22 PROCEDURE — 36415 COLL VENOUS BLD VENIPUNCTURE: CPT | Performed by: INTERNAL MEDICINE

## 2021-11-22 PROCEDURE — 99223 1ST HOSP IP/OBS HIGH 75: CPT | Mod: ,,, | Performed by: INTERNAL MEDICINE

## 2021-11-22 RX ORDER — ONDANSETRON 2 MG/ML
4 INJECTION INTRAMUSCULAR; INTRAVENOUS DAILY PRN
Status: DISCONTINUED | OUTPATIENT
Start: 2021-11-22 | End: 2021-11-22 | Stop reason: HOSPADM

## 2021-11-22 RX ORDER — HYDROMORPHONE HYDROCHLORIDE 2 MG/ML
0.5 INJECTION, SOLUTION INTRAMUSCULAR; INTRAVENOUS; SUBCUTANEOUS EVERY 5 MIN PRN
Status: DISCONTINUED | OUTPATIENT
Start: 2021-11-22 | End: 2021-11-22 | Stop reason: HOSPADM

## 2021-11-22 RX ORDER — PROPOFOL 10 MG/ML
VIAL (ML) INTRAVENOUS
Status: DISCONTINUED | OUTPATIENT
Start: 2021-11-22 | End: 2021-11-22

## 2021-11-22 RX ORDER — PHENYLEPHRINE HYDROCHLORIDE 10 MG/ML
INJECTION INTRAVENOUS
Status: DISCONTINUED | OUTPATIENT
Start: 2021-11-22 | End: 2021-11-22

## 2021-11-22 RX ORDER — FENTANYL CITRATE 50 UG/ML
INJECTION, SOLUTION INTRAMUSCULAR; INTRAVENOUS
Status: DISCONTINUED | OUTPATIENT
Start: 2021-11-22 | End: 2021-11-22

## 2021-11-22 RX ORDER — CEFAZOLIN SODIUM 1 G/3ML
INJECTION, POWDER, FOR SOLUTION INTRAMUSCULAR; INTRAVENOUS
Status: DISCONTINUED | OUTPATIENT
Start: 2021-11-22 | End: 2021-11-22

## 2021-11-22 RX ORDER — POTASSIUM CHLORIDE 20 MEQ/1
20 TABLET, EXTENDED RELEASE ORAL 2 TIMES DAILY
Status: COMPLETED | OUTPATIENT
Start: 2021-11-22 | End: 2021-11-22

## 2021-11-22 RX ORDER — SODIUM CHLORIDE 9 MG/ML
INJECTION, SOLUTION INTRAVENOUS CONTINUOUS PRN
Status: DISCONTINUED | OUTPATIENT
Start: 2021-11-22 | End: 2021-11-22

## 2021-11-22 RX ORDER — ETOMIDATE 2 MG/ML
INJECTION INTRAVENOUS
Status: DISCONTINUED | OUTPATIENT
Start: 2021-11-22 | End: 2021-11-22

## 2021-11-22 RX ORDER — MORPHINE SULFATE 10 MG/ML
4 INJECTION INTRAMUSCULAR; INTRAVENOUS; SUBCUTANEOUS EVERY 5 MIN PRN
Status: DISCONTINUED | OUTPATIENT
Start: 2021-11-22 | End: 2021-11-22 | Stop reason: HOSPADM

## 2021-11-22 RX ORDER — OXYCODONE HYDROCHLORIDE 5 MG/1
5 TABLET ORAL
Status: DISCONTINUED | OUTPATIENT
Start: 2021-11-22 | End: 2021-11-22 | Stop reason: HOSPADM

## 2021-11-22 RX ORDER — ROCURONIUM BROMIDE 50 MG/5 ML
SYRINGE (ML) INTRAVENOUS
Status: DISCONTINUED | OUTPATIENT
Start: 2021-11-22 | End: 2021-11-22

## 2021-11-22 RX ORDER — LIDOCAINE HYDROCHLORIDE 20 MG/ML
INJECTION, SOLUTION EPIDURAL; INFILTRATION; INTRACAUDAL; PERINEURAL
Status: DISCONTINUED | OUTPATIENT
Start: 2021-11-22 | End: 2021-11-22

## 2021-11-22 RX ORDER — LACTOBACILLUS ACIDOPHILUS 500MM CELL
2 CAPSULE ORAL
Status: DISCONTINUED | OUTPATIENT
Start: 2021-11-22 | End: 2021-12-10 | Stop reason: HOSPADM

## 2021-11-22 RX ORDER — MEPERIDINE HYDROCHLORIDE 25 MG/ML
25 INJECTION INTRAMUSCULAR; INTRAVENOUS; SUBCUTANEOUS EVERY 10 MIN PRN
Status: DISCONTINUED | OUTPATIENT
Start: 2021-11-22 | End: 2021-11-22 | Stop reason: HOSPADM

## 2021-11-22 RX ORDER — DIPHENHYDRAMINE HYDROCHLORIDE 50 MG/ML
25 INJECTION INTRAMUSCULAR; INTRAVENOUS EVERY 6 HOURS PRN
Status: DISCONTINUED | OUTPATIENT
Start: 2021-11-22 | End: 2021-11-22 | Stop reason: HOSPADM

## 2021-11-22 RX ADMIN — LIDOCAINE HYDROCHLORIDE 50 MG: 20 INJECTION, SOLUTION EPIDURAL; INFILTRATION; INTRACAUDAL; PERINEURAL at 07:11

## 2021-11-22 RX ADMIN — SUGAMMADEX 200 MG: 100 INJECTION, SOLUTION INTRAVENOUS at 08:11

## 2021-11-22 RX ADMIN — PHENYLEPHRINE HYDROCHLORIDE 100 MCG: 10 INJECTION INTRAVENOUS at 08:11

## 2021-11-22 RX ADMIN — INSULIN ASPART 2 UNITS: 100 INJECTION, SOLUTION INTRAVENOUS; SUBCUTANEOUS at 05:11

## 2021-11-22 RX ADMIN — PIPERACILLIN AND TAZOBACTAM 4.5 G: 4; .5 INJECTION, POWDER, FOR SOLUTION INTRAVENOUS at 05:11

## 2021-11-22 RX ADMIN — FOLIC ACID 1 MG: 1 TABLET ORAL at 09:11

## 2021-11-22 RX ADMIN — ENOXAPARIN SODIUM 30 MG: 30 INJECTION SUBCUTANEOUS at 04:11

## 2021-11-22 RX ADMIN — METOPROLOL TARTRATE 25 MG: 25 TABLET, FILM COATED ORAL at 09:11

## 2021-11-22 RX ADMIN — FENTANYL CITRATE 100 MCG: 50 INJECTION INTRAMUSCULAR; INTRAVENOUS at 08:11

## 2021-11-22 RX ADMIN — HYDROCODONE BITARTRATE AND ACETAMINOPHEN 1 TABLET: 5; 325 TABLET ORAL at 09:11

## 2021-11-22 RX ADMIN — SODIUM CHLORIDE: 9 INJECTION, SOLUTION INTRAVENOUS at 07:11

## 2021-11-22 RX ADMIN — POTASSIUM CHLORIDE 20 MEQ: 1500 TABLET, EXTENDED RELEASE ORAL at 09:11

## 2021-11-22 RX ADMIN — SODIUM BICARBONATE 650 MG: 650 TABLET ORAL at 09:11

## 2021-11-22 RX ADMIN — SODIUM BICARBONATE 650 MG: 650 TABLET ORAL at 04:11

## 2021-11-22 RX ADMIN — TRAZODONE HYDROCHLORIDE 50 MG: 50 TABLET ORAL at 09:11

## 2021-11-22 RX ADMIN — PROPOFOL 100 MG: 10 INJECTION, EMULSION INTRAVENOUS at 07:11

## 2021-11-22 RX ADMIN — CEFAZOLIN 2 G: 1 INJECTION, POWDER, FOR SOLUTION INTRAMUSCULAR; INTRAVENOUS; PARENTERAL at 08:11

## 2021-11-22 RX ADMIN — HYDROCODONE BITARTRATE AND ACETAMINOPHEN 1 TABLET: 5; 325 TABLET ORAL at 11:11

## 2021-11-22 RX ADMIN — PIPERACILLIN AND TAZOBACTAM 4.5 G: 4; .5 INJECTION, POWDER, FOR SOLUTION INTRAVENOUS at 01:11

## 2021-11-22 RX ADMIN — ETOMIDATE 10 MG: 2 INJECTION, SOLUTION INTRAVENOUS at 07:11

## 2021-11-22 RX ADMIN — INSULIN ASPART 10 UNITS: 100 INJECTION, SOLUTION INTRAVENOUS; SUBCUTANEOUS at 04:11

## 2021-11-22 RX ADMIN — POTASSIUM CHLORIDE 20 MEQ: 1500 TABLET, EXTENDED RELEASE ORAL at 01:11

## 2021-11-22 RX ADMIN — INSULIN ASPART 5 UNITS: 100 INJECTION, SOLUTION INTRAVENOUS; SUBCUTANEOUS at 11:11

## 2021-11-22 RX ADMIN — INSULIN DETEMIR 30 UNITS: 100 INJECTION, SOLUTION SUBCUTANEOUS at 11:11

## 2021-11-22 RX ADMIN — ONDANSETRON 4 MG: 2 INJECTION INTRAMUSCULAR; INTRAVENOUS at 08:11

## 2021-11-22 RX ADMIN — THIAMINE HCL TAB 100 MG 100 MG: 100 TAB at 09:11

## 2021-11-22 RX ADMIN — CLINDAMYCIN IN 5 PERCENT DEXTROSE 900 MG: 18 INJECTION, SOLUTION INTRAVENOUS at 05:11

## 2021-11-22 RX ADMIN — Medication 50 MG: at 07:11

## 2021-11-22 RX ADMIN — Medication 20 MG: at 08:11

## 2021-11-22 RX ADMIN — AMPICILLIN SODIUM AND SULBACTAM SODIUM 3 G: 2; 1 INJECTION, POWDER, FOR SOLUTION INTRAMUSCULAR; INTRAVENOUS at 03:11

## 2021-11-22 RX ADMIN — Medication 2 CAPSULE: at 04:11

## 2021-11-22 RX ADMIN — HYDROCODONE BITARTRATE AND ACETAMINOPHEN 1 TABLET: 5; 325 TABLET ORAL at 03:11

## 2021-11-22 RX ADMIN — AMLODIPINE BESYLATE 10 MG: 10 TABLET ORAL at 09:11

## 2021-11-22 RX ADMIN — CLINDAMYCIN IN 5 PERCENT DEXTROSE 900 MG: 18 INJECTION, SOLUTION INTRAVENOUS at 02:11

## 2021-11-23 PROBLEM — F05 ACUTE CONFUSIONAL STATE: Status: RESOLVED | Noted: 2021-11-16 | Resolved: 2021-11-23

## 2021-11-23 PROBLEM — N18.31 ACUTE RENAL FAILURE SUPERIMPOSED ON STAGE 3A CHRONIC KIDNEY DISEASE: Status: ACTIVE | Noted: 2021-11-12

## 2021-11-23 PROBLEM — E11.29 TYPE 2 DIABETES MELLITUS WITH KIDNEY COMPLICATION, WITHOUT LONG-TERM CURRENT USE OF INSULIN: Chronic | Status: ACTIVE | Noted: 2021-11-12

## 2021-11-23 PROBLEM — J96.01 ACUTE HYPOXEMIC RESPIRATORY FAILURE: Status: RESOLVED | Noted: 2021-11-16 | Resolved: 2021-11-23

## 2021-11-23 LAB
ANION GAP SERPL CALCULATED.3IONS-SCNC: 14 MMOL/L (ref 7–16)
BASOPHILS # BLD AUTO: 0.03 K/UL (ref 0–0.2)
BASOPHILS NFR BLD AUTO: 0.1 % (ref 0–1)
BUN SERPL-MCNC: 68 MG/DL (ref 7–18)
BUN/CREAT SERPL: 14 (ref 6–20)
CALCIUM SERPL-MCNC: 8.8 MG/DL (ref 8.5–10.1)
CHLORIDE SERPL-SCNC: 98 MMOL/L (ref 98–107)
CO2 SERPL-SCNC: 17 MMOL/L (ref 21–32)
CREAT SERPL-MCNC: 4.92 MG/DL (ref 0.7–1.3)
DIFFERENTIAL METHOD BLD: ABNORMAL
EOSINOPHIL # BLD AUTO: 0.21 K/UL (ref 0–0.5)
EOSINOPHIL NFR BLD AUTO: 0.9 % (ref 1–4)
ERYTHROCYTE [DISTWIDTH] IN BLOOD BY AUTOMATED COUNT: 14.9 % (ref 11.5–14.5)
FERRITIN SERPL-MCNC: 1613 NG/ML (ref 26–388)
FOLATE SERPL-MCNC: 5.8 NG/ML (ref 3.1–17.5)
GLUCOSE SERPL-MCNC: 172 MG/DL (ref 74–106)
GLUCOSE SERPL-MCNC: 255 MG/DL (ref 70–105)
GLUCOSE SERPL-MCNC: 257 MG/DL (ref 70–105)
GLUCOSE SERPL-MCNC: 269 MG/DL (ref 70–105)
GLUCOSE SERPL-MCNC: 300 MG/DL (ref 70–105)
HCT VFR BLD AUTO: 20.4 % (ref 40–54)
HGB BLD-MCNC: 6.7 G/DL (ref 13.5–18)
IMM GRANULOCYTES # BLD AUTO: 0.97 K/UL (ref 0–0.04)
IMM GRANULOCYTES NFR BLD: 4 % (ref 0–0.4)
IMM RETICS NFR: 36.4 % (ref 2.3–13.4)
IRON SATN MFR SERPL: 27 % (ref 14–50)
IRON SERPL-MCNC: 44 ΜG/DL (ref 65–175)
LYMPHOCYTES # BLD AUTO: 1.69 K/UL (ref 1–4.8)
LYMPHOCYTES NFR BLD AUTO: 6.9 % (ref 27–41)
MAGNESIUM SERPL-MCNC: 2.8 MG/DL (ref 1.7–2.3)
MCH RBC QN AUTO: 30 PG (ref 27–31)
MCHC RBC AUTO-ENTMCNC: 32.8 G/DL (ref 32–36)
MCV RBC AUTO: 91.5 FL (ref 80–96)
MONOCYTES # BLD AUTO: 1.53 K/UL (ref 0–0.8)
MONOCYTES NFR BLD AUTO: 6.3 % (ref 2–6)
MPC BLD CALC-MCNC: 9.6 FL (ref 9.4–12.4)
NEUTROPHILS # BLD AUTO: 19.9 K/UL (ref 1.8–7.7)
NEUTROPHILS NFR BLD AUTO: 81.8 % (ref 53–65)
NRBC # BLD AUTO: 0.03 X10E3/UL
NRBC, AUTO (.00): 0.1 %
PLATELET # BLD AUTO: 494 K/UL (ref 150–400)
POTASSIUM SERPL-SCNC: 3.4 MMOL/L (ref 3.5–5.1)
RBC # BLD AUTO: 2.23 M/UL (ref 4.6–6.2)
RETICS # AUTO: 0.07 X10E6/UL (ref 0.02–0.11)
RETICS/RBC NFR AUTO: 3.1 % (ref 0.4–2.2)
SODIUM SERPL-SCNC: 126 MMOL/L (ref 136–145)
TIBC SERPL-MCNC: 162 ΜG/DL (ref 250–450)
VIT B12 SERPL-MCNC: 607 PG/ML (ref 193–986)
WBC # BLD AUTO: 24.33 K/UL (ref 4.5–11)

## 2021-11-23 PROCEDURE — 25000003 PHARM REV CODE 250: Performed by: STUDENT IN AN ORGANIZED HEALTH CARE EDUCATION/TRAINING PROGRAM

## 2021-11-23 PROCEDURE — 63600175 PHARM REV CODE 636 W HCPCS: Performed by: INTERNAL MEDICINE

## 2021-11-23 PROCEDURE — 82728 ASSAY OF FERRITIN: CPT | Performed by: INTERNAL MEDICINE

## 2021-11-23 PROCEDURE — C9399 UNCLASSIFIED DRUGS OR BIOLOG: HCPCS | Performed by: INTERNAL MEDICINE

## 2021-11-23 PROCEDURE — 82746 ASSAY OF FOLIC ACID SERUM: CPT | Performed by: INTERNAL MEDICINE

## 2021-11-23 PROCEDURE — 99232 SBSQ HOSP IP/OBS MODERATE 35: CPT | Mod: ,,, | Performed by: INTERNAL MEDICINE

## 2021-11-23 PROCEDURE — 85025 COMPLETE CBC W/AUTO DIFF WBC: CPT | Performed by: INTERNAL MEDICINE

## 2021-11-23 PROCEDURE — 85045 AUTOMATED RETICULOCYTE COUNT: CPT | Performed by: INTERNAL MEDICINE

## 2021-11-23 PROCEDURE — 63600175 PHARM REV CODE 636 W HCPCS: Performed by: STUDENT IN AN ORGANIZED HEALTH CARE EDUCATION/TRAINING PROGRAM

## 2021-11-23 PROCEDURE — 20000000 HC ICU ROOM

## 2021-11-23 PROCEDURE — 25000003 PHARM REV CODE 250: Performed by: INTERNAL MEDICINE

## 2021-11-23 PROCEDURE — 83735 ASSAY OF MAGNESIUM: CPT | Performed by: INTERNAL MEDICINE

## 2021-11-23 PROCEDURE — 36415 COLL VENOUS BLD VENIPUNCTURE: CPT | Performed by: INTERNAL MEDICINE

## 2021-11-23 PROCEDURE — 97110 THERAPEUTIC EXERCISES: CPT

## 2021-11-23 PROCEDURE — 99232 PR SUBSEQUENT HOSPITAL CARE,LEVL II: ICD-10-PCS | Mod: ,,, | Performed by: INTERNAL MEDICINE

## 2021-11-23 PROCEDURE — 82962 GLUCOSE BLOOD TEST: CPT

## 2021-11-23 PROCEDURE — 83550 IRON BINDING TEST: CPT | Performed by: INTERNAL MEDICINE

## 2021-11-23 PROCEDURE — 80048 BASIC METABOLIC PNL TOTAL CA: CPT | Performed by: INTERNAL MEDICINE

## 2021-11-23 PROCEDURE — 94761 N-INVAS EAR/PLS OXIMETRY MLT: CPT

## 2021-11-23 PROCEDURE — 27000221 HC OXYGEN, UP TO 24 HOURS

## 2021-11-23 RX ADMIN — METOPROLOL TARTRATE 25 MG: 25 TABLET, FILM COATED ORAL at 08:11

## 2021-11-23 RX ADMIN — HYDROCODONE BITARTRATE AND ACETAMINOPHEN 1 TABLET: 5; 325 TABLET ORAL at 08:11

## 2021-11-23 RX ADMIN — Medication 2 CAPSULE: at 11:11

## 2021-11-23 RX ADMIN — SODIUM BICARBONATE 650 MG: 650 TABLET ORAL at 08:11

## 2021-11-23 RX ADMIN — SIMETHICONE 80 MG: 80 TABLET, CHEWABLE ORAL at 02:11

## 2021-11-23 RX ADMIN — ENOXAPARIN SODIUM 30 MG: 30 INJECTION SUBCUTANEOUS at 04:11

## 2021-11-23 RX ADMIN — AMPICILLIN SODIUM AND SULBACTAM SODIUM 3 G: 2; 1 INJECTION, POWDER, FOR SOLUTION INTRAMUSCULAR; INTRAVENOUS at 04:11

## 2021-11-23 RX ADMIN — SIMETHICONE 80 MG: 80 TABLET, CHEWABLE ORAL at 08:11

## 2021-11-23 RX ADMIN — Medication 2 CAPSULE: at 08:11

## 2021-11-23 RX ADMIN — AMLODIPINE BESYLATE 10 MG: 10 TABLET ORAL at 08:11

## 2021-11-23 RX ADMIN — INSULIN ASPART 3 UNITS: 100 INJECTION, SOLUTION INTRAVENOUS; SUBCUTANEOUS at 09:11

## 2021-11-23 RX ADMIN — SODIUM BICARBONATE 650 MG: 650 TABLET ORAL at 02:11

## 2021-11-23 RX ADMIN — INSULIN ASPART 6 UNITS: 100 INJECTION, SOLUTION INTRAVENOUS; SUBCUTANEOUS at 04:11

## 2021-11-23 RX ADMIN — INSULIN DETEMIR 40 UNITS: 100 INJECTION, SOLUTION SUBCUTANEOUS at 08:11

## 2021-11-23 RX ADMIN — Medication 2 CAPSULE: at 04:11

## 2021-11-23 RX ADMIN — INSULIN ASPART 6 UNITS: 100 INJECTION, SOLUTION INTRAVENOUS; SUBCUTANEOUS at 05:11

## 2021-11-23 RX ADMIN — THIAMINE HCL TAB 100 MG 100 MG: 100 TAB at 08:11

## 2021-11-23 RX ADMIN — FOLIC ACID 1 MG: 1 TABLET ORAL at 08:11

## 2021-11-23 RX ADMIN — INSULIN ASPART 6 UNITS: 100 INJECTION, SOLUTION INTRAVENOUS; SUBCUTANEOUS at 11:11

## 2021-11-23 RX ADMIN — HYDROCODONE BITARTRATE AND ACETAMINOPHEN 1 TABLET: 5; 325 TABLET ORAL at 02:11

## 2021-11-23 RX ADMIN — AMPICILLIN SODIUM AND SULBACTAM SODIUM 3 G: 2; 1 INJECTION, POWDER, FOR SOLUTION INTRAMUSCULAR; INTRAVENOUS at 03:11

## 2021-11-24 ENCOUNTER — ANESTHESIA EVENT (OUTPATIENT)
Dept: SURGERY | Facility: HOSPITAL | Age: 46
DRG: 500 | End: 2021-11-24
Payer: COMMERCIAL

## 2021-11-24 ENCOUNTER — ANESTHESIA (OUTPATIENT)
Dept: SURGERY | Facility: HOSPITAL | Age: 46
DRG: 500 | End: 2021-11-24
Payer: COMMERCIAL

## 2021-11-24 PROBLEM — R00.0 TACHYCARDIA: Status: RESOLVED | Noted: 2021-11-16 | Resolved: 2021-11-24

## 2021-11-24 LAB
ABO + RH BLD: NORMAL
ABO + RH BLD: NORMAL
ANION GAP SERPL CALCULATED.3IONS-SCNC: 17 MMOL/L (ref 7–16)
BASOPHILS # BLD AUTO: 0.03 K/UL (ref 0–0.2)
BASOPHILS # BLD AUTO: 0.06 K/UL (ref 0–0.2)
BASOPHILS NFR BLD AUTO: 0.1 % (ref 0–1)
BASOPHILS NFR BLD AUTO: 0.2 % (ref 0–1)
BLD PROD TYP BPU: NORMAL
BLD PROD TYP BPU: NORMAL
BLOOD UNIT EXPIRATION DATE: NORMAL
BLOOD UNIT EXPIRATION DATE: NORMAL
BLOOD UNIT TYPE CODE: 5100
BLOOD UNIT TYPE CODE: 5100
BUN SERPL-MCNC: 69 MG/DL (ref 7–18)
BUN/CREAT SERPL: 15 (ref 6–20)
CALCIUM SERPL-MCNC: 8.6 MG/DL (ref 8.5–10.1)
CHLORIDE SERPL-SCNC: 104 MMOL/L (ref 98–107)
CO2 SERPL-SCNC: 17 MMOL/L (ref 21–32)
CREAT SERPL-MCNC: 4.67 MG/DL (ref 0.7–1.3)
CROSSMATCH INTERPRETATION: NORMAL
CROSSMATCH INTERPRETATION: NORMAL
DIFFERENTIAL METHOD BLD: ABNORMAL
DIFFERENTIAL METHOD BLD: ABNORMAL
DISPENSE STATUS: NORMAL
DISPENSE STATUS: NORMAL
EOSINOPHIL # BLD AUTO: 0.11 K/UL (ref 0–0.5)
EOSINOPHIL # BLD AUTO: 0.2 K/UL (ref 0–0.5)
EOSINOPHIL NFR BLD AUTO: 0.4 % (ref 1–4)
EOSINOPHIL NFR BLD AUTO: 0.9 % (ref 1–4)
ERYTHROCYTE [DISTWIDTH] IN BLOOD BY AUTOMATED COUNT: 14.8 % (ref 11.5–14.5)
ERYTHROCYTE [DISTWIDTH] IN BLOOD BY AUTOMATED COUNT: 15 % (ref 11.5–14.5)
GLUCOSE SERPL-MCNC: 105 MG/DL (ref 74–106)
GLUCOSE SERPL-MCNC: 107 MG/DL (ref 70–105)
GLUCOSE SERPL-MCNC: 123 MG/DL (ref 70–105)
GLUCOSE SERPL-MCNC: 135 MG/DL (ref 70–105)
GLUCOSE SERPL-MCNC: 136 MG/DL (ref 70–105)
GLUCOSE SERPL-MCNC: 88 MG/DL (ref 70–105)
HCT VFR BLD AUTO: 17.6 % (ref 40–54)
HCT VFR BLD AUTO: 19.3 % (ref 40–54)
HCT VFR BLD AUTO: 27.6 % (ref 40–54)
HGB BLD-MCNC: 5.9 G/DL (ref 13.5–18)
HGB BLD-MCNC: 6.4 G/DL (ref 13.5–18)
HGB BLD-MCNC: 9.1 G/DL (ref 13.5–18)
IMM GRANULOCYTES # BLD AUTO: 0.93 K/UL (ref 0–0.04)
IMM GRANULOCYTES # BLD AUTO: 1.03 K/UL (ref 0–0.04)
IMM GRANULOCYTES NFR BLD: 3.3 % (ref 0–0.4)
IMM GRANULOCYTES NFR BLD: 4.2 % (ref 0–0.4)
INDIRECT COOMBS: NORMAL
LYMPHOCYTES # BLD AUTO: 1.67 K/UL (ref 1–4.8)
LYMPHOCYTES # BLD AUTO: 2.05 K/UL (ref 1–4.8)
LYMPHOCYTES NFR BLD AUTO: 6.6 % (ref 27–41)
LYMPHOCYTES NFR BLD AUTO: 7.5 % (ref 27–41)
LYMPHOCYTES NFR BLD MANUAL: 16 % (ref 27–41)
MCH RBC QN AUTO: 29.9 PG (ref 27–31)
MCH RBC QN AUTO: 30.4 PG (ref 27–31)
MCHC RBC AUTO-ENTMCNC: 33 G/DL (ref 32–36)
MCHC RBC AUTO-ENTMCNC: 33.5 G/DL (ref 32–36)
MCV RBC AUTO: 90.7 FL (ref 80–96)
MCV RBC AUTO: 90.8 FL (ref 80–96)
METAMYELOCYTES NFR BLD MANUAL: 3 %
MONOCYTES # BLD AUTO: 1.15 K/UL (ref 0–0.8)
MONOCYTES # BLD AUTO: 1.37 K/UL (ref 0–0.8)
MONOCYTES NFR BLD AUTO: 3.7 % (ref 2–6)
MONOCYTES NFR BLD AUTO: 6.1 % (ref 2–6)
MONOCYTES NFR BLD MANUAL: 2 % (ref 2–6)
MPC BLD CALC-MCNC: 10 FL (ref 9.4–12.4)
MPC BLD CALC-MCNC: 9.5 FL (ref 9.4–12.4)
NEUTROPHILS # BLD AUTO: 18.08 K/UL (ref 1.8–7.7)
NEUTROPHILS # BLD AUTO: 26.83 K/UL (ref 1.8–7.7)
NEUTROPHILS NFR BLD AUTO: 81.2 % (ref 53–65)
NEUTROPHILS NFR BLD AUTO: 85.8 % (ref 53–65)
NEUTS BAND NFR BLD MANUAL: 5 % (ref 1–5)
NEUTS SEG NFR BLD MANUAL: 74 % (ref 50–62)
NRBC # BLD AUTO: 0.03 X10E3/UL
NRBC # BLD AUTO: 0.03 X10E3/UL
NRBC BLD MANUAL-RTO: 1 /100 WBC
NRBC, AUTO (.00): 0.1 %
NRBC, AUTO (.00): 0.1 %
PLATELET # BLD AUTO: 424 K/UL (ref 150–400)
PLATELET # BLD AUTO: 458 K/UL (ref 150–400)
PLATELET MORPHOLOGY: NORMAL
POTASSIUM SERPL-SCNC: 3.2 MMOL/L (ref 3.5–5.1)
RBC # BLD AUTO: 1.94 M/UL (ref 4.6–6.2)
RBC # BLD AUTO: 3.04 M/UL (ref 4.6–6.2)
RBC MORPH BLD: NORMAL
RH BLD: NORMAL
SODIUM SERPL-SCNC: 135 MMOL/L (ref 136–145)
UNIT NUMBER: NORMAL
UNIT NUMBER: NORMAL
WBC # BLD AUTO: 22.28 K/UL (ref 4.5–11)
WBC # BLD AUTO: 31.14 K/UL (ref 4.5–11)

## 2021-11-24 PROCEDURE — 63600175 PHARM REV CODE 636 W HCPCS: Performed by: INTERNAL MEDICINE

## 2021-11-24 PROCEDURE — 27000655: Performed by: ANESTHESIOLOGY

## 2021-11-24 PROCEDURE — 36000708 HC OR TIME LEV III 1ST 15 MIN: Performed by: SURGERY

## 2021-11-24 PROCEDURE — 37000009 HC ANESTHESIA EA ADD 15 MINS: Performed by: SURGERY

## 2021-11-24 PROCEDURE — 37000008 HC ANESTHESIA 1ST 15 MINUTES: Performed by: SURGERY

## 2021-11-24 PROCEDURE — 27000221 HC OXYGEN, UP TO 24 HOURS

## 2021-11-24 PROCEDURE — 25000003 PHARM REV CODE 250: Performed by: NURSE ANESTHETIST, CERTIFIED REGISTERED

## 2021-11-24 PROCEDURE — 36415 COLL VENOUS BLD VENIPUNCTURE: CPT | Performed by: INTERNAL MEDICINE

## 2021-11-24 PROCEDURE — 27000689 HC BLADE LARYNGOSCOPE ANY SIZE: Performed by: ANESTHESIOLOGY

## 2021-11-24 PROCEDURE — 63600175 PHARM REV CODE 636 W HCPCS: Performed by: ANESTHESIOLOGY

## 2021-11-24 PROCEDURE — 44345 PR REVISION OF COLOSTOMY,COMPLICATED: ICD-10-PCS | Mod: 78,,, | Performed by: SURGERY

## 2021-11-24 PROCEDURE — 82962 GLUCOSE BLOOD TEST: CPT

## 2021-11-24 PROCEDURE — 27000165 HC TUBE, ETT CUFFED: Performed by: ANESTHESIOLOGY

## 2021-11-24 PROCEDURE — D9220A PRA ANESTHESIA: Mod: ANES,,, | Performed by: ANESTHESIOLOGY

## 2021-11-24 PROCEDURE — 86923 COMPATIBILITY TEST ELECTRIC: CPT | Mod: 91

## 2021-11-24 PROCEDURE — 88307 TISSUE EXAM BY PATHOLOGIST: CPT | Mod: SUR | Performed by: SURGERY

## 2021-11-24 PROCEDURE — 88307 SURGICAL PATHOLOGY: ICD-10-PCS | Mod: 26,,, | Performed by: PATHOLOGY

## 2021-11-24 PROCEDURE — 36430 TRANSFUSION BLD/BLD COMPNT: CPT

## 2021-11-24 PROCEDURE — 11004 PR DEBRIDE NECROTIC SKIN/ TISSUE, GENIT & PERINM: ICD-10-PCS | Mod: 79,XU,, | Performed by: SURGERY

## 2021-11-24 PROCEDURE — 94761 N-INVAS EAR/PLS OXIMETRY MLT: CPT

## 2021-11-24 PROCEDURE — 88307 TISSUE EXAM BY PATHOLOGIST: CPT | Mod: 26,,, | Performed by: PATHOLOGY

## 2021-11-24 PROCEDURE — 99233 SBSQ HOSP IP/OBS HIGH 50: CPT | Mod: ,,, | Performed by: INTERNAL MEDICINE

## 2021-11-24 PROCEDURE — 27100168 OPTIME MED/SURG SUP & DEVICES NON-STERILE SUPPLY: Performed by: SURGERY

## 2021-11-24 PROCEDURE — 11004 DBRDMT SKIN XTRNL GENT&PER: CPT | Mod: 79,XU,, | Performed by: SURGERY

## 2021-11-24 PROCEDURE — 25000003 PHARM REV CODE 250: Performed by: STUDENT IN AN ORGANIZED HEALTH CARE EDUCATION/TRAINING PROGRAM

## 2021-11-24 PROCEDURE — P9016 RBC LEUKOCYTES REDUCED: HCPCS | Performed by: INTERNAL MEDICINE

## 2021-11-24 PROCEDURE — 85018 HEMOGLOBIN: CPT | Performed by: INTERNAL MEDICINE

## 2021-11-24 PROCEDURE — 86900 BLOOD TYPING SEROLOGIC ABO: CPT | Performed by: INTERNAL MEDICINE

## 2021-11-24 PROCEDURE — 25000003 PHARM REV CODE 250: Performed by: INTERNAL MEDICINE

## 2021-11-24 PROCEDURE — 36000709 HC OR TIME LEV III EA ADD 15 MIN: Performed by: SURGERY

## 2021-11-24 PROCEDURE — 63600175 PHARM REV CODE 636 W HCPCS: Performed by: NURSE ANESTHETIST, CERTIFIED REGISTERED

## 2021-11-24 PROCEDURE — 86923 COMPATIBILITY TEST ELECTRIC: CPT | Performed by: ANESTHESIOLOGY

## 2021-11-24 PROCEDURE — 27201423 OPTIME MED/SURG SUP & DEVICES STERILE SUPPLY: Performed by: SURGERY

## 2021-11-24 PROCEDURE — 85025 COMPLETE CBC W/AUTO DIFF WBC: CPT | Performed by: INTERNAL MEDICINE

## 2021-11-24 PROCEDURE — 99233 PR SUBSEQUENT HOSPITAL CARE,LEVL III: ICD-10-PCS | Mod: ,,, | Performed by: INTERNAL MEDICINE

## 2021-11-24 PROCEDURE — 20000000 HC ICU ROOM

## 2021-11-24 PROCEDURE — 27000510 HC BLANKET BAIR HUGGER ANY SIZE: Performed by: ANESTHESIOLOGY

## 2021-11-24 PROCEDURE — 86923 COMPATIBILITY TEST ELECTRIC: CPT | Mod: 91 | Performed by: INTERNAL MEDICINE

## 2021-11-24 PROCEDURE — D9220A PRA ANESTHESIA: ICD-10-PCS | Mod: ANES,,, | Performed by: ANESTHESIOLOGY

## 2021-11-24 PROCEDURE — D9220A PRA ANESTHESIA: Mod: CRNA,,, | Performed by: NURSE ANESTHETIST, CERTIFIED REGISTERED

## 2021-11-24 PROCEDURE — 80048 BASIC METABOLIC PNL TOTAL CA: CPT | Performed by: INTERNAL MEDICINE

## 2021-11-24 PROCEDURE — 44345 REVISION OF COLOSTOMY: CPT | Mod: 78,,, | Performed by: SURGERY

## 2021-11-24 PROCEDURE — 99900035 HC TECH TIME PER 15 MIN (STAT)

## 2021-11-24 PROCEDURE — C9399 UNCLASSIFIED DRUGS OR BIOLOG: HCPCS | Performed by: INTERNAL MEDICINE

## 2021-11-24 PROCEDURE — D9220A PRA ANESTHESIA: ICD-10-PCS | Mod: CRNA,,, | Performed by: NURSE ANESTHETIST, CERTIFIED REGISTERED

## 2021-11-24 PROCEDURE — 85014 HEMATOCRIT: CPT | Performed by: INTERNAL MEDICINE

## 2021-11-24 PROCEDURE — P9016 RBC LEUKOCYTES REDUCED: HCPCS | Performed by: ANESTHESIOLOGY

## 2021-11-24 RX ORDER — FENTANYL CITRATE 50 UG/ML
INJECTION, SOLUTION INTRAMUSCULAR; INTRAVENOUS
Status: DISCONTINUED | OUTPATIENT
Start: 2021-11-24 | End: 2021-11-24

## 2021-11-24 RX ORDER — MIDAZOLAM HYDROCHLORIDE 1 MG/ML
INJECTION INTRAMUSCULAR; INTRAVENOUS
Status: DISCONTINUED | OUTPATIENT
Start: 2021-11-24 | End: 2021-11-24

## 2021-11-24 RX ORDER — HYDROMORPHONE HYDROCHLORIDE 2 MG/ML
0.5 INJECTION, SOLUTION INTRAMUSCULAR; INTRAVENOUS; SUBCUTANEOUS EVERY 5 MIN PRN
Status: DISCONTINUED | OUTPATIENT
Start: 2021-11-24 | End: 2021-11-25

## 2021-11-24 RX ORDER — ROCURONIUM BROMIDE 10 MG/ML
INJECTION, SOLUTION INTRAVENOUS
Status: DISCONTINUED | OUTPATIENT
Start: 2021-11-24 | End: 2021-11-24

## 2021-11-24 RX ORDER — HYDROCODONE BITARTRATE AND ACETAMINOPHEN 500; 5 MG/1; MG/1
TABLET ORAL
Status: DISCONTINUED | OUTPATIENT
Start: 2021-11-24 | End: 2021-12-08

## 2021-11-24 RX ORDER — DIPHENHYDRAMINE HYDROCHLORIDE 50 MG/ML
25 INJECTION INTRAMUSCULAR; INTRAVENOUS EVERY 6 HOURS PRN
Status: DISCONTINUED | OUTPATIENT
Start: 2021-11-24 | End: 2021-11-25

## 2021-11-24 RX ORDER — ONDANSETRON 2 MG/ML
4 INJECTION INTRAMUSCULAR; INTRAVENOUS DAILY PRN
Status: DISCONTINUED | OUTPATIENT
Start: 2021-11-24 | End: 2021-11-25

## 2021-11-24 RX ORDER — MEPERIDINE HYDROCHLORIDE 25 MG/ML
25 INJECTION INTRAMUSCULAR; INTRAVENOUS; SUBCUTANEOUS EVERY 10 MIN PRN
Status: ACTIVE | OUTPATIENT
Start: 2021-11-24 | End: 2021-11-24

## 2021-11-24 RX ORDER — HYDROCODONE BITARTRATE AND ACETAMINOPHEN 5; 325 MG/1; MG/1
1 TABLET ORAL EVERY 4 HOURS PRN
Status: DISCONTINUED | OUTPATIENT
Start: 2021-11-24 | End: 2021-11-25

## 2021-11-24 RX ORDER — ONDANSETRON 2 MG/ML
INJECTION INTRAMUSCULAR; INTRAVENOUS
Status: DISCONTINUED | OUTPATIENT
Start: 2021-11-24 | End: 2021-11-24

## 2021-11-24 RX ORDER — LIDOCAINE HYDROCHLORIDE 20 MG/ML
INJECTION, SOLUTION EPIDURAL; INFILTRATION; INTRACAUDAL; PERINEURAL
Status: DISCONTINUED | OUTPATIENT
Start: 2021-11-24 | End: 2021-11-24

## 2021-11-24 RX ORDER — MORPHINE SULFATE 10 MG/ML
4 INJECTION INTRAMUSCULAR; INTRAVENOUS; SUBCUTANEOUS EVERY 5 MIN PRN
Status: DISCONTINUED | OUTPATIENT
Start: 2021-11-24 | End: 2021-11-25

## 2021-11-24 RX ORDER — PANTOPRAZOLE SODIUM 40 MG/1
40 TABLET, DELAYED RELEASE ORAL DAILY
Status: DISCONTINUED | OUTPATIENT
Start: 2021-11-24 | End: 2021-12-10 | Stop reason: HOSPADM

## 2021-11-24 RX ORDER — METOPROLOL TARTRATE 50 MG/1
50 TABLET ORAL 2 TIMES DAILY
Status: DISCONTINUED | OUTPATIENT
Start: 2021-11-24 | End: 2021-12-10 | Stop reason: HOSPADM

## 2021-11-24 RX ORDER — POTASSIUM CHLORIDE 20 MEQ/1
40 TABLET, EXTENDED RELEASE ORAL ONCE
Status: COMPLETED | OUTPATIENT
Start: 2021-11-24 | End: 2021-11-24

## 2021-11-24 RX ORDER — HYDROMORPHONE HYDROCHLORIDE 2 MG/ML
1 INJECTION, SOLUTION INTRAMUSCULAR; INTRAVENOUS; SUBCUTANEOUS
Status: COMPLETED | OUTPATIENT
Start: 2021-11-24 | End: 2021-11-25

## 2021-11-24 RX ORDER — VECURONIUM BROMIDE FOR INJECTION 1 MG/ML
INJECTION, POWDER, LYOPHILIZED, FOR SOLUTION INTRAVENOUS
Status: DISCONTINUED | OUTPATIENT
Start: 2021-11-24 | End: 2021-11-24

## 2021-11-24 RX ORDER — PROPOFOL 10 MG/ML
VIAL (ML) INTRAVENOUS
Status: DISCONTINUED | OUTPATIENT
Start: 2021-11-24 | End: 2021-11-24

## 2021-11-24 RX ORDER — HYDROMORPHONE HYDROCHLORIDE 2 MG/ML
INJECTION, SOLUTION INTRAMUSCULAR; INTRAVENOUS; SUBCUTANEOUS
Status: DISCONTINUED | OUTPATIENT
Start: 2021-11-24 | End: 2021-11-24

## 2021-11-24 RX ADMIN — ONDANSETRON 4 MG: 2 INJECTION INTRAMUSCULAR; INTRAVENOUS at 01:11

## 2021-11-24 RX ADMIN — AMLODIPINE BESYLATE 10 MG: 10 TABLET ORAL at 09:11

## 2021-11-24 RX ADMIN — ONDANSETRON 4 MG: 2 INJECTION INTRAMUSCULAR; INTRAVENOUS at 04:11

## 2021-11-24 RX ADMIN — PROPOFOL 200 MG: 10 INJECTION, EMULSION INTRAVENOUS at 01:11

## 2021-11-24 RX ADMIN — HYDROMORPHONE HYDROCHLORIDE 1 MG: 2 INJECTION INTRAMUSCULAR; INTRAVENOUS; SUBCUTANEOUS at 09:11

## 2021-11-24 RX ADMIN — MIDAZOLAM HYDROCHLORIDE 2 MG: 1 INJECTION, SOLUTION INTRAMUSCULAR; INTRAVENOUS at 01:11

## 2021-11-24 RX ADMIN — HYDROMORPHONE HYDROCHLORIDE 0.5 MG: 2 INJECTION, SOLUTION INTRAMUSCULAR; INTRAVENOUS; SUBCUTANEOUS at 04:11

## 2021-11-24 RX ADMIN — METOPROLOL TARTRATE 50 MG: 50 TABLET, FILM COATED ORAL at 09:11

## 2021-11-24 RX ADMIN — THERA TABS 1 TABLET: TAB at 09:11

## 2021-11-24 RX ADMIN — FENTANYL CITRATE 100 MCG: 50 INJECTION INTRAMUSCULAR; INTRAVENOUS at 01:11

## 2021-11-24 RX ADMIN — SUGAMMADEX 200 MG: 100 INJECTION, SOLUTION INTRAVENOUS at 03:11

## 2021-11-24 RX ADMIN — POTASSIUM CHLORIDE 40 MEQ: 1500 TABLET, EXTENDED RELEASE ORAL at 09:11

## 2021-11-24 RX ADMIN — SODIUM CHLORIDE: 9 INJECTION, SOLUTION INTRAVENOUS at 01:11

## 2021-11-24 RX ADMIN — INSULIN DETEMIR 40 UNITS: 100 INJECTION, SOLUTION SUBCUTANEOUS at 09:11

## 2021-11-24 RX ADMIN — LIDOCAINE HYDROCHLORIDE 70 MG: 20 INJECTION, SOLUTION INTRAVENOUS at 01:11

## 2021-11-24 RX ADMIN — ROCURONIUM BROMIDE 50 MG: 10 INJECTION, SOLUTION INTRAVENOUS at 01:11

## 2021-11-24 RX ADMIN — HYDROMORPHONE HYDROCHLORIDE 1 MG: 2 INJECTION INTRAMUSCULAR; INTRAVENOUS; SUBCUTANEOUS at 04:11

## 2021-11-24 RX ADMIN — SODIUM BICARBONATE 650 MG: 650 TABLET ORAL at 09:11

## 2021-11-24 RX ADMIN — Medication 2 CAPSULE: at 09:11

## 2021-11-24 RX ADMIN — AMPICILLIN SODIUM AND SULBACTAM SODIUM 3 G: 2; 1 INJECTION, POWDER, FOR SOLUTION INTRAMUSCULAR; INTRAVENOUS at 02:11

## 2021-11-24 RX ADMIN — VECURONIUM BROMIDE 3 MG: 1 INJECTION, POWDER, LYOPHILIZED, FOR SOLUTION INTRAVENOUS at 02:11

## 2021-11-24 RX ADMIN — SODIUM CHLORIDE: 9 INJECTION, SOLUTION INTRAVENOUS at 09:11

## 2021-11-24 RX ADMIN — HYDROCODONE BITARTRATE AND ACETAMINOPHEN 1 TABLET: 5; 325 TABLET ORAL at 02:11

## 2021-11-24 RX ADMIN — HYDROMORPHONE HYDROCHLORIDE 1 MG: 2 INJECTION INTRAMUSCULAR; INTRAVENOUS; SUBCUTANEOUS at 11:11

## 2021-11-24 RX ADMIN — FOLIC ACID 1 MG: 1 TABLET ORAL at 09:11

## 2021-11-24 RX ADMIN — PANTOPRAZOLE SODIUM 40 MG: 40 TABLET, DELAYED RELEASE ORAL at 09:11

## 2021-11-25 PROBLEM — Z93.3 S/P COLOSTOMY: Status: ACTIVE | Noted: 2021-11-25

## 2021-11-25 LAB
ANION GAP SERPL CALCULATED.3IONS-SCNC: 19 MMOL/L (ref 7–16)
ANISOCYTOSIS BLD QL SMEAR: ABNORMAL
BASOPHILS # BLD AUTO: 0.04 K/UL (ref 0–0.2)
BASOPHILS NFR BLD AUTO: 0.1 % (ref 0–1)
BUN SERPL-MCNC: 67 MG/DL (ref 7–18)
BUN/CREAT SERPL: 15 (ref 6–20)
CALCIUM SERPL-MCNC: 8.6 MG/DL (ref 8.5–10.1)
CHLORIDE SERPL-SCNC: 108 MMOL/L (ref 98–107)
CO2 SERPL-SCNC: 16 MMOL/L (ref 21–32)
CREAT SERPL-MCNC: 4.37 MG/DL (ref 0.7–1.3)
DIFFERENTIAL METHOD BLD: ABNORMAL
EOSINOPHIL # BLD AUTO: 0.02 K/UL (ref 0–0.5)
EOSINOPHIL NFR BLD AUTO: 0.1 % (ref 1–4)
ERYTHROCYTE [DISTWIDTH] IN BLOOD BY AUTOMATED COUNT: 15.5 % (ref 11.5–14.5)
GLUCOSE SERPL-MCNC: 100 MG/DL (ref 70–105)
GLUCOSE SERPL-MCNC: 120 MG/DL (ref 70–105)
GLUCOSE SERPL-MCNC: 124 MG/DL (ref 74–106)
GLUCOSE SERPL-MCNC: 141 MG/DL (ref 70–105)
GLUCOSE SERPL-MCNC: 150 MG/DL (ref 70–105)
GLUCOSE SERPL-MCNC: 185 MG/DL (ref 70–105)
GLUCOSE SERPL-MCNC: 87 MG/DL (ref 70–105)
HCT VFR BLD AUTO: 22.2 % (ref 40–54)
HGB BLD-MCNC: 7.3 G/DL (ref 13.5–18)
IMM GRANULOCYTES # BLD AUTO: 0.44 K/UL (ref 0–0.04)
IMM GRANULOCYTES NFR BLD: 1.5 % (ref 0–0.4)
LYMPHOCYTES # BLD AUTO: 0.88 K/UL (ref 1–4.8)
LYMPHOCYTES NFR BLD AUTO: 3 % (ref 27–41)
LYMPHOCYTES NFR BLD MANUAL: 2 % (ref 27–41)
MCH RBC QN AUTO: 30.3 PG (ref 27–31)
MCHC RBC AUTO-ENTMCNC: 32.9 G/DL (ref 32–36)
MCV RBC AUTO: 92.1 FL (ref 80–96)
MONOCYTES # BLD AUTO: 1.29 K/UL (ref 0–0.8)
MONOCYTES NFR BLD AUTO: 4.4 % (ref 2–6)
MONOCYTES NFR BLD MANUAL: 7 % (ref 2–6)
MPC BLD CALC-MCNC: 9.5 FL (ref 9.4–12.4)
NEUTROPHILS # BLD AUTO: 26.51 K/UL (ref 1.8–7.7)
NEUTROPHILS NFR BLD AUTO: 90.9 % (ref 53–65)
NEUTS SEG NFR BLD MANUAL: 91 % (ref 50–62)
NRBC # BLD AUTO: 0 X10E3/UL
NRBC, AUTO (.00): 0 %
OVALOCYTES BLD QL SMEAR: ABNORMAL
PLATELET # BLD AUTO: 495 K/UL (ref 150–400)
PLATELET MORPHOLOGY: ABNORMAL
POLYCHROMASIA BLD QL SMEAR: ABNORMAL
POTASSIUM SERPL-SCNC: 3.8 MMOL/L (ref 3.5–5.1)
RBC # BLD AUTO: 2.41 M/UL (ref 4.6–6.2)
SODIUM SERPL-SCNC: 139 MMOL/L (ref 136–145)
TARGETS BLD QL SMEAR: ABNORMAL
WBC # BLD AUTO: 29.18 K/UL (ref 4.5–11)

## 2021-11-25 PROCEDURE — 25000003 PHARM REV CODE 250: Performed by: NURSE PRACTITIONER

## 2021-11-25 PROCEDURE — 80048 BASIC METABOLIC PNL TOTAL CA: CPT | Performed by: INTERNAL MEDICINE

## 2021-11-25 PROCEDURE — C9399 UNCLASSIFIED DRUGS OR BIOLOG: HCPCS | Performed by: SURGERY

## 2021-11-25 PROCEDURE — 25000003 PHARM REV CODE 250: Performed by: SURGERY

## 2021-11-25 PROCEDURE — 63600175 PHARM REV CODE 636 W HCPCS: Performed by: INTERNAL MEDICINE

## 2021-11-25 PROCEDURE — 99233 SBSQ HOSP IP/OBS HIGH 50: CPT | Mod: ,,, | Performed by: NURSE PRACTITIONER

## 2021-11-25 PROCEDURE — 63600175 PHARM REV CODE 636 W HCPCS: Performed by: SURGERY

## 2021-11-25 PROCEDURE — 63600175 PHARM REV CODE 636 W HCPCS: Performed by: STUDENT IN AN ORGANIZED HEALTH CARE EDUCATION/TRAINING PROGRAM

## 2021-11-25 PROCEDURE — 82962 GLUCOSE BLOOD TEST: CPT

## 2021-11-25 PROCEDURE — 36415 COLL VENOUS BLD VENIPUNCTURE: CPT | Performed by: INTERNAL MEDICINE

## 2021-11-25 PROCEDURE — 99233 PR SUBSEQUENT HOSPITAL CARE,LEVL III: ICD-10-PCS | Mod: ,,, | Performed by: NURSE PRACTITIONER

## 2021-11-25 PROCEDURE — 85025 COMPLETE CBC W/AUTO DIFF WBC: CPT | Performed by: INTERNAL MEDICINE

## 2021-11-25 PROCEDURE — 20000000 HC ICU ROOM

## 2021-11-25 PROCEDURE — 25000003 PHARM REV CODE 250: Performed by: STUDENT IN AN ORGANIZED HEALTH CARE EDUCATION/TRAINING PROGRAM

## 2021-11-25 PROCEDURE — 25000003 PHARM REV CODE 250: Performed by: INTERNAL MEDICINE

## 2021-11-25 RX ORDER — HYDROCODONE BITARTRATE AND ACETAMINOPHEN 5; 325 MG/1; MG/1
1 TABLET ORAL EVERY 4 HOURS PRN
Status: DISCONTINUED | OUTPATIENT
Start: 2021-11-25 | End: 2021-12-10 | Stop reason: HOSPADM

## 2021-11-25 RX ORDER — MORPHINE SULFATE 2 MG/ML
6 INJECTION, SOLUTION INTRAMUSCULAR; INTRAVENOUS EVERY 4 HOURS PRN
Status: DISCONTINUED | OUTPATIENT
Start: 2021-11-25 | End: 2021-12-10 | Stop reason: HOSPADM

## 2021-11-25 RX ORDER — SODIUM CHLORIDE 450 MG/100ML
INJECTION, SOLUTION INTRAVENOUS CONTINUOUS
Status: DISPENSED | OUTPATIENT
Start: 2021-11-25 | End: 2021-11-26

## 2021-11-25 RX ADMIN — MORPHINE SULFATE 6 MG: 2 INJECTION, SOLUTION INTRAMUSCULAR; INTRAVENOUS at 07:11

## 2021-11-25 RX ADMIN — FOLIC ACID 1 MG: 1 TABLET ORAL at 08:11

## 2021-11-25 RX ADMIN — HYDROCODONE BITARTRATE AND ACETAMINOPHEN 1 TABLET: 5; 325 TABLET ORAL at 04:11

## 2021-11-25 RX ADMIN — Medication 2 CAPSULE: at 07:11

## 2021-11-25 RX ADMIN — Medication 2 CAPSULE: at 12:11

## 2021-11-25 RX ADMIN — SODIUM BICARBONATE 650 MG: 650 TABLET ORAL at 08:11

## 2021-11-25 RX ADMIN — THERA TABS 1 TABLET: TAB at 08:11

## 2021-11-25 RX ADMIN — SODIUM CHLORIDE: 4.5 INJECTION, SOLUTION INTRAVENOUS at 07:11

## 2021-11-25 RX ADMIN — INSULIN DETEMIR 40 UNITS: 100 INJECTION, SOLUTION SUBCUTANEOUS at 08:11

## 2021-11-25 RX ADMIN — HYDROMORPHONE HYDROCHLORIDE 1 MG: 2 INJECTION INTRAMUSCULAR; INTRAVENOUS; SUBCUTANEOUS at 03:11

## 2021-11-25 RX ADMIN — Medication 2 CAPSULE: at 04:11

## 2021-11-25 RX ADMIN — PANTOPRAZOLE SODIUM 40 MG: 40 TABLET, DELAYED RELEASE ORAL at 08:11

## 2021-11-25 RX ADMIN — INSULIN ASPART 2 UNITS: 100 INJECTION, SOLUTION INTRAVENOUS; SUBCUTANEOUS at 12:11

## 2021-11-25 RX ADMIN — AMLODIPINE BESYLATE 10 MG: 10 TABLET ORAL at 08:11

## 2021-11-25 RX ADMIN — HYDROCODONE BITARTRATE AND ACETAMINOPHEN 1 TABLET: 5; 325 TABLET ORAL at 10:11

## 2021-11-25 RX ADMIN — AMPICILLIN SODIUM AND SULBACTAM SODIUM 3 G: 2; 1 INJECTION, POWDER, FOR SOLUTION INTRAMUSCULAR; INTRAVENOUS at 04:11

## 2021-11-25 RX ADMIN — MORPHINE SULFATE 6 MG: 2 INJECTION, SOLUTION INTRAMUSCULAR; INTRAVENOUS at 12:11

## 2021-11-25 RX ADMIN — ENOXAPARIN SODIUM 30 MG: 30 INJECTION SUBCUTANEOUS at 04:11

## 2021-11-25 RX ADMIN — AMPICILLIN SODIUM AND SULBACTAM SODIUM 3 G: 2; 1 INJECTION, POWDER, FOR SOLUTION INTRAMUSCULAR; INTRAVENOUS at 03:11

## 2021-11-25 RX ADMIN — METOPROLOL TARTRATE 50 MG: 50 TABLET, FILM COATED ORAL at 08:11

## 2021-11-25 RX ADMIN — SODIUM BICARBONATE 650 MG: 650 TABLET ORAL at 04:11

## 2021-11-25 RX ADMIN — SODIUM CHLORIDE: 4.5 INJECTION, SOLUTION INTRAVENOUS at 06:11

## 2021-11-26 LAB
ABO + RH BLD: NORMAL
ANION GAP SERPL CALCULATED.3IONS-SCNC: 17 MMOL/L (ref 7–16)
ANISOCYTOSIS BLD QL SMEAR: ABNORMAL
BASOPHILS # BLD AUTO: 0.04 K/UL (ref 0–0.2)
BASOPHILS NFR BLD AUTO: 0.1 % (ref 0–1)
BLD PROD TYP BPU: NORMAL
BLOOD UNIT EXPIRATION DATE: NORMAL
BLOOD UNIT TYPE CODE: 5100
BUN SERPL-MCNC: 68 MG/DL (ref 7–18)
BUN/CREAT SERPL: 16 (ref 6–20)
CALCIUM SERPL-MCNC: 8.4 MG/DL (ref 8.5–10.1)
CHLORIDE SERPL-SCNC: 108 MMOL/L (ref 98–107)
CO2 SERPL-SCNC: 18 MMOL/L (ref 21–32)
CREAT SERPL-MCNC: 4.27 MG/DL (ref 0.7–1.3)
CROSSMATCH INTERPRETATION: NORMAL
DIFFERENTIAL METHOD BLD: ABNORMAL
DISPENSE STATUS: NORMAL
EOSINOPHIL # BLD AUTO: 0.09 K/UL (ref 0–0.5)
EOSINOPHIL NFR BLD AUTO: 0.3 % (ref 1–4)
EOSINOPHIL NFR BLD MANUAL: 1 % (ref 1–4)
ERYTHROCYTE [DISTWIDTH] IN BLOOD BY AUTOMATED COUNT: 15.8 % (ref 11.5–14.5)
GLUCOSE SERPL-MCNC: 104 MG/DL (ref 70–105)
GLUCOSE SERPL-MCNC: 127 MG/DL (ref 70–105)
GLUCOSE SERPL-MCNC: 133 MG/DL (ref 70–105)
GLUCOSE SERPL-MCNC: 134 MG/DL (ref 74–106)
GLUCOSE SERPL-MCNC: 144 MG/DL (ref 70–105)
GLUCOSE SERPL-MCNC: 187 MG/DL (ref 70–105)
HCT VFR BLD AUTO: 22 % (ref 40–54)
HGB BLD-MCNC: 6.7 G/DL (ref 13.5–18)
HYPOCHROMIA BLD QL SMEAR: ABNORMAL
IMM GRANULOCYTES # BLD AUTO: 0.48 K/UL (ref 0–0.04)
IMM GRANULOCYTES NFR BLD: 1.7 % (ref 0–0.4)
LYMPHOCYTES # BLD AUTO: 1.19 K/UL (ref 1–4.8)
LYMPHOCYTES NFR BLD AUTO: 4.1 % (ref 27–41)
LYMPHOCYTES NFR BLD MANUAL: 3 % (ref 27–41)
MCH RBC QN AUTO: 30 PG (ref 27–31)
MCHC RBC AUTO-ENTMCNC: 30.5 G/DL (ref 32–36)
MCV RBC AUTO: 98.7 FL (ref 80–96)
MONOCYTES # BLD AUTO: 1.79 K/UL (ref 0–0.8)
MONOCYTES NFR BLD AUTO: 6.2 % (ref 2–6)
MONOCYTES NFR BLD MANUAL: 5 % (ref 2–6)
MPC BLD CALC-MCNC: 9.4 FL (ref 9.4–12.4)
NEUTROPHILS # BLD AUTO: 25.21 K/UL (ref 1.8–7.7)
NEUTROPHILS NFR BLD AUTO: 87.6 % (ref 53–65)
NEUTS BAND NFR BLD MANUAL: 5 % (ref 1–5)
NEUTS SEG NFR BLD MANUAL: 86 % (ref 50–62)
NRBC # BLD AUTO: 0 X10E3/UL
NRBC, AUTO (.00): 0 %
PLATELET # BLD AUTO: 449 K/UL (ref 150–400)
PLATELET MORPHOLOGY: ABNORMAL
POLYCHROMASIA BLD QL SMEAR: ABNORMAL
POTASSIUM SERPL-SCNC: 3.4 MMOL/L (ref 3.5–5.1)
RBC # BLD AUTO: 2.23 M/UL (ref 4.6–6.2)
SODIUM SERPL-SCNC: 140 MMOL/L (ref 136–145)
UNIT NUMBER: NORMAL
WBC # BLD AUTO: 28.8 K/UL (ref 4.5–11)

## 2021-11-26 PROCEDURE — 99232 PR SUBSEQUENT HOSPITAL CARE,LEVL II: ICD-10-PCS | Mod: ,,, | Performed by: INTERNAL MEDICINE

## 2021-11-26 PROCEDURE — 20000000 HC ICU ROOM

## 2021-11-26 PROCEDURE — 25000003 PHARM REV CODE 250: Performed by: NURSE PRACTITIONER

## 2021-11-26 PROCEDURE — 82962 GLUCOSE BLOOD TEST: CPT

## 2021-11-26 PROCEDURE — 25000003 PHARM REV CODE 250: Performed by: STUDENT IN AN ORGANIZED HEALTH CARE EDUCATION/TRAINING PROGRAM

## 2021-11-26 PROCEDURE — 36430 TRANSFUSION BLD/BLD COMPNT: CPT

## 2021-11-26 PROCEDURE — 25000003 PHARM REV CODE 250: Performed by: INTERNAL MEDICINE

## 2021-11-26 PROCEDURE — 97530 THERAPEUTIC ACTIVITIES: CPT

## 2021-11-26 PROCEDURE — 99232 SBSQ HOSP IP/OBS MODERATE 35: CPT | Mod: ,,, | Performed by: INTERNAL MEDICINE

## 2021-11-26 PROCEDURE — 63600175 PHARM REV CODE 636 W HCPCS: Performed by: SURGERY

## 2021-11-26 PROCEDURE — 63600175 PHARM REV CODE 636 W HCPCS: Performed by: STUDENT IN AN ORGANIZED HEALTH CARE EDUCATION/TRAINING PROGRAM

## 2021-11-26 PROCEDURE — 25000003 PHARM REV CODE 250: Performed by: SURGERY

## 2021-11-26 PROCEDURE — 94761 N-INVAS EAR/PLS OXIMETRY MLT: CPT

## 2021-11-26 PROCEDURE — 80048 BASIC METABOLIC PNL TOTAL CA: CPT | Performed by: NURSE PRACTITIONER

## 2021-11-26 PROCEDURE — C9399 UNCLASSIFIED DRUGS OR BIOLOG: HCPCS | Performed by: SURGERY

## 2021-11-26 PROCEDURE — 85025 COMPLETE CBC W/AUTO DIFF WBC: CPT | Performed by: NURSE PRACTITIONER

## 2021-11-26 PROCEDURE — 99233 PR SUBSEQUENT HOSPITAL CARE,LEVL III: ICD-10-PCS | Mod: ,,, | Performed by: NURSE PRACTITIONER

## 2021-11-26 PROCEDURE — 99233 SBSQ HOSP IP/OBS HIGH 50: CPT | Mod: ,,, | Performed by: NURSE PRACTITIONER

## 2021-11-26 PROCEDURE — P9016 RBC LEUKOCYTES REDUCED: HCPCS

## 2021-11-26 PROCEDURE — 36415 COLL VENOUS BLD VENIPUNCTURE: CPT | Performed by: NURSE PRACTITIONER

## 2021-11-26 PROCEDURE — 27000221 HC OXYGEN, UP TO 24 HOURS

## 2021-11-26 RX ORDER — SODIUM CHLORIDE 450 MG/100ML
INJECTION, SOLUTION INTRAVENOUS CONTINUOUS
Status: DISPENSED | OUTPATIENT
Start: 2021-11-26 | End: 2021-11-27

## 2021-11-26 RX ORDER — HYDROCODONE BITARTRATE AND ACETAMINOPHEN 500; 5 MG/1; MG/1
TABLET ORAL
Status: DISCONTINUED | OUTPATIENT
Start: 2021-11-26 | End: 2021-12-08

## 2021-11-26 RX ORDER — LOSARTAN POTASSIUM 25 MG/1
25 TABLET ORAL DAILY
Status: DISCONTINUED | OUTPATIENT
Start: 2021-11-26 | End: 2021-12-10 | Stop reason: HOSPADM

## 2021-11-26 RX ADMIN — SIMETHICONE 80 MG: 80 TABLET, CHEWABLE ORAL at 08:11

## 2021-11-26 RX ADMIN — MORPHINE SULFATE 6 MG: 2 INJECTION, SOLUTION INTRAMUSCULAR; INTRAVENOUS at 01:11

## 2021-11-26 RX ADMIN — SODIUM CHLORIDE: 4.5 INJECTION, SOLUTION INTRAVENOUS at 04:11

## 2021-11-26 RX ADMIN — AMLODIPINE BESYLATE 10 MG: 10 TABLET ORAL at 09:11

## 2021-11-26 RX ADMIN — AMPICILLIN SODIUM AND SULBACTAM SODIUM 3 G: 2; 1 INJECTION, POWDER, FOR SOLUTION INTRAMUSCULAR; INTRAVENOUS at 03:11

## 2021-11-26 RX ADMIN — SODIUM BICARBONATE 650 MG: 650 TABLET ORAL at 08:11

## 2021-11-26 RX ADMIN — METOPROLOL TARTRATE 50 MG: 50 TABLET, FILM COATED ORAL at 08:11

## 2021-11-26 RX ADMIN — SODIUM CHLORIDE: 9 INJECTION, SOLUTION INTRAVENOUS at 08:11

## 2021-11-26 RX ADMIN — ENOXAPARIN SODIUM 30 MG: 30 INJECTION SUBCUTANEOUS at 04:11

## 2021-11-26 RX ADMIN — Medication 2 CAPSULE: at 04:11

## 2021-11-26 RX ADMIN — INSULIN DETEMIR 40 UNITS: 100 INJECTION, SOLUTION SUBCUTANEOUS at 09:11

## 2021-11-26 RX ADMIN — SODIUM BICARBONATE 650 MG: 650 TABLET ORAL at 04:11

## 2021-11-26 RX ADMIN — PANTOPRAZOLE SODIUM 40 MG: 40 TABLET, DELAYED RELEASE ORAL at 09:11

## 2021-11-26 RX ADMIN — SODIUM CHLORIDE: 4.5 INJECTION, SOLUTION INTRAVENOUS at 09:11

## 2021-11-26 RX ADMIN — SODIUM BICARBONATE 650 MG: 650 TABLET ORAL at 09:11

## 2021-11-26 RX ADMIN — THERA TABS 1 TABLET: TAB at 09:11

## 2021-11-26 RX ADMIN — SIMETHICONE 80 MG: 80 TABLET, CHEWABLE ORAL at 01:11

## 2021-11-26 RX ADMIN — METOPROLOL TARTRATE 50 MG: 50 TABLET, FILM COATED ORAL at 09:11

## 2021-11-26 RX ADMIN — LOSARTAN POTASSIUM 25 MG: 25 TABLET, FILM COATED ORAL at 12:11

## 2021-11-26 RX ADMIN — FOLIC ACID 1 MG: 1 TABLET ORAL at 09:11

## 2021-11-26 RX ADMIN — HYDROCODONE BITARTRATE AND ACETAMINOPHEN 1 TABLET: 5; 325 TABLET ORAL at 09:11

## 2021-11-26 RX ADMIN — Medication 2 CAPSULE: at 12:11

## 2021-11-26 RX ADMIN — AMPICILLIN SODIUM AND SULBACTAM SODIUM 3 G: 2; 1 INJECTION, POWDER, FOR SOLUTION INTRAMUSCULAR; INTRAVENOUS at 04:11

## 2021-11-26 RX ADMIN — Medication 2 CAPSULE: at 09:11

## 2021-11-26 RX ADMIN — HYDROCODONE BITARTRATE AND ACETAMINOPHEN 1 TABLET: 5; 325 TABLET ORAL at 08:11

## 2021-11-27 LAB
ABO + RH BLD: NORMAL
ANION GAP SERPL CALCULATED.3IONS-SCNC: 18 MMOL/L (ref 7–16)
ANISOCYTOSIS BLD QL SMEAR: ABNORMAL
BASOPHILS # BLD AUTO: 0.05 K/UL (ref 0–0.2)
BASOPHILS NFR BLD AUTO: 0.2 % (ref 0–1)
BLD PROD TYP BPU: NORMAL
BLOOD UNIT EXPIRATION DATE: NORMAL
BLOOD UNIT TYPE CODE: 5100
BUN SERPL-MCNC: 62 MG/DL (ref 7–18)
BUN/CREAT SERPL: 16 (ref 6–20)
CALCIUM SERPL-MCNC: 8.2 MG/DL (ref 8.5–10.1)
CHLORIDE SERPL-SCNC: 110 MMOL/L (ref 98–107)
CO2 SERPL-SCNC: 17 MMOL/L (ref 21–32)
CREAT SERPL-MCNC: 3.8 MG/DL (ref 0.7–1.3)
CRENATED CELLS: ABNORMAL
CROSSMATCH INTERPRETATION: NORMAL
DIFFERENTIAL METHOD BLD: ABNORMAL
DISPENSE STATUS: NORMAL
EOSINOPHIL # BLD AUTO: 0.24 K/UL (ref 0–0.5)
EOSINOPHIL NFR BLD AUTO: 1 % (ref 1–4)
EOSINOPHIL NFR BLD MANUAL: 1 % (ref 1–4)
ERYTHROCYTE [DISTWIDTH] IN BLOOD BY AUTOMATED COUNT: 15.2 % (ref 11.5–14.5)
GLUCOSE SERPL-MCNC: 148 MG/DL (ref 70–105)
GLUCOSE SERPL-MCNC: 168 MG/DL (ref 70–105)
GLUCOSE SERPL-MCNC: 91 MG/DL (ref 70–105)
GLUCOSE SERPL-MCNC: 93 MG/DL (ref 70–105)
GLUCOSE SERPL-MCNC: 95 MG/DL (ref 74–106)
HCT VFR BLD AUTO: 21.4 % (ref 40–54)
HGB BLD-MCNC: 6.9 G/DL (ref 13.5–18)
IMM GRANULOCYTES # BLD AUTO: 0.39 K/UL (ref 0–0.04)
IMM GRANULOCYTES NFR BLD: 1.5 % (ref 0–0.4)
LYMPHOCYTES # BLD AUTO: 1.39 K/UL (ref 1–4.8)
LYMPHOCYTES NFR BLD AUTO: 5.5 % (ref 27–41)
LYMPHOCYTES NFR BLD MANUAL: 9 % (ref 27–41)
MCH RBC QN AUTO: 30 PG (ref 27–31)
MCHC RBC AUTO-ENTMCNC: 32.2 G/DL (ref 32–36)
MCV RBC AUTO: 93 FL (ref 80–96)
MONOCYTES # BLD AUTO: 1.87 K/UL (ref 0–0.8)
MONOCYTES NFR BLD AUTO: 7.4 % (ref 2–6)
MONOCYTES NFR BLD MANUAL: 2 % (ref 2–6)
MPC BLD CALC-MCNC: 9.7 FL (ref 9.4–12.4)
NEUTROPHILS # BLD AUTO: 21.3 K/UL (ref 1.8–7.7)
NEUTROPHILS NFR BLD AUTO: 84.4 % (ref 53–65)
NEUTS BAND NFR BLD MANUAL: 1 % (ref 1–5)
NEUTS SEG NFR BLD MANUAL: 87 % (ref 50–62)
NRBC # BLD AUTO: 0.02 X10E3/UL
NRBC, AUTO (.00): 0.1 %
PLATELET # BLD AUTO: 442 K/UL (ref 150–400)
PLATELET MORPHOLOGY: ABNORMAL
POTASSIUM SERPL-SCNC: 2.9 MMOL/L (ref 3.5–5.1)
RBC # BLD AUTO: 2.3 M/UL (ref 4.6–6.2)
SODIUM SERPL-SCNC: 142 MMOL/L (ref 136–145)
UNIT NUMBER: NORMAL
WBC # BLD AUTO: 25.24 K/UL (ref 4.5–11)

## 2021-11-27 PROCEDURE — 80048 BASIC METABOLIC PNL TOTAL CA: CPT | Performed by: NURSE PRACTITIONER

## 2021-11-27 PROCEDURE — 36415 COLL VENOUS BLD VENIPUNCTURE: CPT | Performed by: NURSE PRACTITIONER

## 2021-11-27 PROCEDURE — 25000003 PHARM REV CODE 250: Performed by: NURSE PRACTITIONER

## 2021-11-27 PROCEDURE — 25000003 PHARM REV CODE 250: Performed by: SURGERY

## 2021-11-27 PROCEDURE — 63600175 PHARM REV CODE 636 W HCPCS: Performed by: SURGERY

## 2021-11-27 PROCEDURE — 25000003 PHARM REV CODE 250: Performed by: STUDENT IN AN ORGANIZED HEALTH CARE EDUCATION/TRAINING PROGRAM

## 2021-11-27 PROCEDURE — 99232 PR SUBSEQUENT HOSPITAL CARE,LEVL II: ICD-10-PCS | Mod: ,,, | Performed by: INTERNAL MEDICINE

## 2021-11-27 PROCEDURE — C9399 UNCLASSIFIED DRUGS OR BIOLOG: HCPCS | Performed by: INTERNAL MEDICINE

## 2021-11-27 PROCEDURE — 11000001 HC ACUTE MED/SURG PRIVATE ROOM

## 2021-11-27 PROCEDURE — 85025 COMPLETE CBC W/AUTO DIFF WBC: CPT | Performed by: NURSE PRACTITIONER

## 2021-11-27 PROCEDURE — P9016 RBC LEUKOCYTES REDUCED: HCPCS | Performed by: INTERNAL MEDICINE

## 2021-11-27 PROCEDURE — 36430 TRANSFUSION BLD/BLD COMPNT: CPT

## 2021-11-27 PROCEDURE — 99232 SBSQ HOSP IP/OBS MODERATE 35: CPT | Mod: ,,, | Performed by: INTERNAL MEDICINE

## 2021-11-27 PROCEDURE — 63600175 PHARM REV CODE 636 W HCPCS: Performed by: STUDENT IN AN ORGANIZED HEALTH CARE EDUCATION/TRAINING PROGRAM

## 2021-11-27 PROCEDURE — 25000003 PHARM REV CODE 250: Performed by: INTERNAL MEDICINE

## 2021-11-27 PROCEDURE — 63600175 PHARM REV CODE 636 W HCPCS: Performed by: INTERNAL MEDICINE

## 2021-11-27 PROCEDURE — 82962 GLUCOSE BLOOD TEST: CPT

## 2021-11-27 RX ORDER — INSULIN ASPART 100 [IU]/ML
0-26 INJECTION, SOLUTION INTRAVENOUS; SUBCUTANEOUS
Status: DISCONTINUED | OUTPATIENT
Start: 2021-11-27 | End: 2021-12-10 | Stop reason: HOSPADM

## 2021-11-27 RX ORDER — POTASSIUM CHLORIDE 750 MG/1
50 TABLET, EXTENDED RELEASE ORAL ONCE
Status: COMPLETED | OUTPATIENT
Start: 2021-11-27 | End: 2021-11-27

## 2021-11-27 RX ORDER — HYDROCODONE BITARTRATE AND ACETAMINOPHEN 500; 5 MG/1; MG/1
TABLET ORAL
Status: DISCONTINUED | OUTPATIENT
Start: 2021-11-27 | End: 2021-12-10 | Stop reason: SDUPTHER

## 2021-11-27 RX ADMIN — FOLIC ACID 1 MG: 1 TABLET ORAL at 08:11

## 2021-11-27 RX ADMIN — HYDROCODONE BITARTRATE AND ACETAMINOPHEN 1 TABLET: 5; 325 TABLET ORAL at 03:11

## 2021-11-27 RX ADMIN — AMPICILLIN SODIUM AND SULBACTAM SODIUM 3 G: 2; 1 INJECTION, POWDER, FOR SOLUTION INTRAMUSCULAR; INTRAVENOUS at 03:11

## 2021-11-27 RX ADMIN — LOSARTAN POTASSIUM 25 MG: 25 TABLET, FILM COATED ORAL at 08:11

## 2021-11-27 RX ADMIN — METOPROLOL TARTRATE 50 MG: 50 TABLET, FILM COATED ORAL at 08:11

## 2021-11-27 RX ADMIN — SODIUM CHLORIDE: 9 INJECTION, SOLUTION INTRAVENOUS at 09:11

## 2021-11-27 RX ADMIN — SODIUM BICARBONATE 650 MG: 650 TABLET ORAL at 08:11

## 2021-11-27 RX ADMIN — Medication 2 CAPSULE: at 04:11

## 2021-11-27 RX ADMIN — INSULIN DETEMIR 30 UNITS: 100 INJECTION, SOLUTION SUBCUTANEOUS at 08:11

## 2021-11-27 RX ADMIN — AMLODIPINE BESYLATE 10 MG: 10 TABLET ORAL at 08:11

## 2021-11-27 RX ADMIN — THERA TABS 1 TABLET: TAB at 08:11

## 2021-11-27 RX ADMIN — POTASSIUM CHLORIDE 50 MEQ: 750 TABLET, FILM COATED, EXTENDED RELEASE ORAL at 07:11

## 2021-11-27 RX ADMIN — SODIUM BICARBONATE 650 MG: 650 TABLET ORAL at 03:11

## 2021-11-27 RX ADMIN — SODIUM CHLORIDE 100 ML/HR: 4.5 INJECTION, SOLUTION INTRAVENOUS at 03:11

## 2021-11-27 RX ADMIN — Medication 2 CAPSULE: at 11:11

## 2021-11-27 RX ADMIN — Medication 2 CAPSULE: at 07:11

## 2021-11-27 RX ADMIN — ENOXAPARIN SODIUM 30 MG: 30 INJECTION SUBCUTANEOUS at 04:11

## 2021-11-27 RX ADMIN — MORPHINE SULFATE 6 MG: 2 INJECTION, SOLUTION INTRAMUSCULAR; INTRAVENOUS at 08:11

## 2021-11-27 RX ADMIN — PANTOPRAZOLE SODIUM 40 MG: 40 TABLET, DELAYED RELEASE ORAL at 08:11

## 2021-11-27 RX ADMIN — INSULIN ASPART 5 UNITS: 100 INJECTION, SOLUTION INTRAVENOUS; SUBCUTANEOUS at 11:11

## 2021-11-28 LAB
ANION GAP SERPL CALCULATED.3IONS-SCNC: 17 MMOL/L (ref 7–16)
BASOPHILS # BLD AUTO: 0.03 K/UL (ref 0–0.2)
BASOPHILS NFR BLD AUTO: 0.2 % (ref 0–1)
BUN SERPL-MCNC: 60 MG/DL (ref 7–18)
BUN/CREAT SERPL: 17 (ref 6–20)
CALCIUM SERPL-MCNC: 8.4 MG/DL (ref 8.5–10.1)
CHLORIDE SERPL-SCNC: 110 MMOL/L (ref 98–107)
CO2 SERPL-SCNC: 18 MMOL/L (ref 21–32)
CREAT SERPL-MCNC: 3.49 MG/DL (ref 0.7–1.3)
DIFFERENTIAL METHOD BLD: ABNORMAL
EOSINOPHIL # BLD AUTO: 0.14 K/UL (ref 0–0.5)
EOSINOPHIL NFR BLD AUTO: 0.9 % (ref 1–4)
ERYTHROCYTE [DISTWIDTH] IN BLOOD BY AUTOMATED COUNT: 15.1 % (ref 11.5–14.5)
GLUCOSE SERPL-MCNC: 117 MG/DL (ref 70–105)
GLUCOSE SERPL-MCNC: 131 MG/DL (ref 70–105)
GLUCOSE SERPL-MCNC: 171 MG/DL (ref 70–105)
GLUCOSE SERPL-MCNC: 48 MG/DL (ref 74–106)
GLUCOSE SERPL-MCNC: 70 MG/DL (ref 70–105)
HCT VFR BLD AUTO: 24.6 % (ref 40–54)
HGB BLD-MCNC: 7.9 G/DL (ref 13.5–18)
IMM GRANULOCYTES # BLD AUTO: 0.14 K/UL (ref 0–0.04)
IMM GRANULOCYTES NFR BLD: 0.9 % (ref 0–0.4)
LYMPHOCYTES # BLD AUTO: 0.74 K/UL (ref 1–4.8)
LYMPHOCYTES NFR BLD AUTO: 4.8 % (ref 27–41)
MCH RBC QN AUTO: 29.8 PG (ref 27–31)
MCHC RBC AUTO-ENTMCNC: 32.1 G/DL (ref 32–36)
MCV RBC AUTO: 92.8 FL (ref 80–96)
MONOCYTES # BLD AUTO: 1.04 K/UL (ref 0–0.8)
MONOCYTES NFR BLD AUTO: 6.8 % (ref 2–6)
MPC BLD CALC-MCNC: 9.8 FL (ref 9.4–12.4)
NEUTROPHILS # BLD AUTO: 13.29 K/UL (ref 1.8–7.7)
NEUTROPHILS NFR BLD AUTO: 86.4 % (ref 53–65)
NRBC # BLD AUTO: 0.03 X10E3/UL
NRBC, AUTO (.00): 0.2 %
PLATELET # BLD AUTO: 445 K/UL (ref 150–400)
POTASSIUM SERPL-SCNC: 3.2 MMOL/L (ref 3.5–5.1)
RBC # BLD AUTO: 2.65 M/UL (ref 4.6–6.2)
SODIUM SERPL-SCNC: 142 MMOL/L (ref 136–145)
WBC # BLD AUTO: 15.38 K/UL (ref 4.5–11)

## 2021-11-28 PROCEDURE — 99232 SBSQ HOSP IP/OBS MODERATE 35: CPT | Mod: ,,, | Performed by: INTERNAL MEDICINE

## 2021-11-28 PROCEDURE — 63600175 PHARM REV CODE 636 W HCPCS: Performed by: STUDENT IN AN ORGANIZED HEALTH CARE EDUCATION/TRAINING PROGRAM

## 2021-11-28 PROCEDURE — 85025 COMPLETE CBC W/AUTO DIFF WBC: CPT | Performed by: NURSE PRACTITIONER

## 2021-11-28 PROCEDURE — 82962 GLUCOSE BLOOD TEST: CPT

## 2021-11-28 PROCEDURE — 25000003 PHARM REV CODE 250: Performed by: STUDENT IN AN ORGANIZED HEALTH CARE EDUCATION/TRAINING PROGRAM

## 2021-11-28 PROCEDURE — 25000003 PHARM REV CODE 250: Performed by: SURGERY

## 2021-11-28 PROCEDURE — 80048 BASIC METABOLIC PNL TOTAL CA: CPT | Performed by: NURSE PRACTITIONER

## 2021-11-28 PROCEDURE — 36415 COLL VENOUS BLD VENIPUNCTURE: CPT | Performed by: NURSE PRACTITIONER

## 2021-11-28 PROCEDURE — 25000003 PHARM REV CODE 250: Performed by: NURSE PRACTITIONER

## 2021-11-28 PROCEDURE — 63600175 PHARM REV CODE 636 W HCPCS: Performed by: SURGERY

## 2021-11-28 PROCEDURE — 99232 PR SUBSEQUENT HOSPITAL CARE,LEVL II: ICD-10-PCS | Mod: ,,, | Performed by: INTERNAL MEDICINE

## 2021-11-28 PROCEDURE — 11000001 HC ACUTE MED/SURG PRIVATE ROOM

## 2021-11-28 PROCEDURE — 27000221 HC OXYGEN, UP TO 24 HOURS

## 2021-11-28 PROCEDURE — 63600175 PHARM REV CODE 636 W HCPCS: Performed by: INTERNAL MEDICINE

## 2021-11-28 PROCEDURE — 25000003 PHARM REV CODE 250: Performed by: INTERNAL MEDICINE

## 2021-11-28 PROCEDURE — 94761 N-INVAS EAR/PLS OXIMETRY MLT: CPT

## 2021-11-28 RX ORDER — POTASSIUM CHLORIDE 20 MEQ/1
40 TABLET, EXTENDED RELEASE ORAL ONCE
Status: COMPLETED | OUTPATIENT
Start: 2021-11-28 | End: 2021-11-28

## 2021-11-28 RX ADMIN — METOPROLOL TARTRATE 50 MG: 50 TABLET, FILM COATED ORAL at 08:11

## 2021-11-28 RX ADMIN — SODIUM BICARBONATE 650 MG: 650 TABLET ORAL at 08:11

## 2021-11-28 RX ADMIN — SODIUM BICARBONATE 650 MG: 650 TABLET ORAL at 03:11

## 2021-11-28 RX ADMIN — FOLIC ACID 1 MG: 1 TABLET ORAL at 08:11

## 2021-11-28 RX ADMIN — AMLODIPINE BESYLATE 10 MG: 10 TABLET ORAL at 08:11

## 2021-11-28 RX ADMIN — Medication 2 CAPSULE: at 04:11

## 2021-11-28 RX ADMIN — HYDROCODONE BITARTRATE AND ACETAMINOPHEN 1 TABLET: 5; 325 TABLET ORAL at 04:11

## 2021-11-28 RX ADMIN — HYDROCODONE BITARTRATE AND ACETAMINOPHEN 1 TABLET: 5; 325 TABLET ORAL at 08:11

## 2021-11-28 RX ADMIN — DEXTROSE MONOHYDRATE 12.5 G: 500 INJECTION PARENTERAL at 07:11

## 2021-11-28 RX ADMIN — PANTOPRAZOLE SODIUM 40 MG: 40 TABLET, DELAYED RELEASE ORAL at 09:11

## 2021-11-28 RX ADMIN — INSULIN ASPART 5 UNITS: 100 INJECTION, SOLUTION INTRAVENOUS; SUBCUTANEOUS at 04:11

## 2021-11-28 RX ADMIN — LOSARTAN POTASSIUM 25 MG: 25 TABLET, FILM COATED ORAL at 08:11

## 2021-11-28 RX ADMIN — Medication 2 CAPSULE: at 07:11

## 2021-11-28 RX ADMIN — POTASSIUM CHLORIDE 40 MEQ: 1500 TABLET, EXTENDED RELEASE ORAL at 07:11

## 2021-11-28 RX ADMIN — Medication 2 CAPSULE: at 11:11

## 2021-11-28 RX ADMIN — AMPICILLIN SODIUM AND SULBACTAM SODIUM 3 G: 2; 1 INJECTION, POWDER, FOR SOLUTION INTRAMUSCULAR; INTRAVENOUS at 02:11

## 2021-11-28 RX ADMIN — SIMETHICONE 80 MG: 80 TABLET, CHEWABLE ORAL at 08:11

## 2021-11-28 RX ADMIN — THERA TABS 1 TABLET: TAB at 09:11

## 2021-11-28 RX ADMIN — ENOXAPARIN SODIUM 30 MG: 30 INJECTION SUBCUTANEOUS at 04:11

## 2021-11-28 RX ADMIN — AMPICILLIN SODIUM AND SULBACTAM SODIUM 3 G: 2; 1 INJECTION, POWDER, FOR SOLUTION INTRAMUSCULAR; INTRAVENOUS at 03:11

## 2021-11-28 RX ADMIN — INSULIN ASPART 10 UNITS: 100 INJECTION, SOLUTION INTRAVENOUS; SUBCUTANEOUS at 11:11

## 2021-11-29 LAB
ANION GAP SERPL CALCULATED.3IONS-SCNC: 20 MMOL/L (ref 7–16)
BASOPHILS # BLD AUTO: 0.04 K/UL (ref 0–0.2)
BASOPHILS NFR BLD AUTO: 0.2 % (ref 0–1)
BUN SERPL-MCNC: 58 MG/DL (ref 7–18)
BUN/CREAT SERPL: 17 (ref 6–20)
CALCIUM SERPL-MCNC: 8.1 MG/DL (ref 8.5–10.1)
CHLORIDE SERPL-SCNC: 110 MMOL/L (ref 98–107)
CO2 SERPL-SCNC: 15 MMOL/L (ref 21–32)
CREAT SERPL-MCNC: 3.42 MG/DL (ref 0.7–1.3)
DIFFERENTIAL METHOD BLD: ABNORMAL
EOSINOPHIL # BLD AUTO: 0.14 K/UL (ref 0–0.5)
EOSINOPHIL NFR BLD AUTO: 0.8 % (ref 1–4)
ERYTHROCYTE [DISTWIDTH] IN BLOOD BY AUTOMATED COUNT: 15 % (ref 11.5–14.5)
ESTROGEN SERPL-MCNC: NORMAL PG/ML
GLUCOSE SERPL-MCNC: 127 MG/DL (ref 70–105)
GLUCOSE SERPL-MCNC: 172 MG/DL (ref 70–105)
GLUCOSE SERPL-MCNC: 178 MG/DL (ref 70–105)
GLUCOSE SERPL-MCNC: 199 MG/DL (ref 74–106)
GLUCOSE SERPL-MCNC: 227 MG/DL (ref 70–105)
HCT VFR BLD AUTO: 22.7 % (ref 40–54)
HGB BLD-MCNC: 7 G/DL (ref 13.5–18)
IMM GRANULOCYTES # BLD AUTO: 0.2 K/UL (ref 0–0.04)
IMM GRANULOCYTES NFR BLD: 1.2 % (ref 0–0.4)
LAB AP GROSS DESCRIPTION: NORMAL
LAB AP LABORATORY NOTES: NORMAL
LYMPHOCYTES # BLD AUTO: 1.18 K/UL (ref 1–4.8)
LYMPHOCYTES NFR BLD AUTO: 7.2 % (ref 27–41)
MCH RBC QN AUTO: 29.8 PG (ref 27–31)
MCHC RBC AUTO-ENTMCNC: 30.8 G/DL (ref 32–36)
MCV RBC AUTO: 96.6 FL (ref 80–96)
MONOCYTES # BLD AUTO: 1.32 K/UL (ref 0–0.8)
MONOCYTES NFR BLD AUTO: 8 % (ref 2–6)
MPC BLD CALC-MCNC: 9.7 FL (ref 9.4–12.4)
NEUTROPHILS # BLD AUTO: 13.62 K/UL (ref 1.8–7.7)
NEUTROPHILS NFR BLD AUTO: 82.6 % (ref 53–65)
NRBC # BLD AUTO: 0 X10E3/UL
NRBC, AUTO (.00): 0 %
PLATELET # BLD AUTO: 391 K/UL (ref 150–400)
POTASSIUM SERPL-SCNC: 3.6 MMOL/L (ref 3.5–5.1)
RBC # BLD AUTO: 2.35 M/UL (ref 4.6–6.2)
SODIUM SERPL-SCNC: 141 MMOL/L (ref 136–145)
T3RU NFR SERPL: NORMAL %
WBC # BLD AUTO: 16.5 K/UL (ref 4.5–11)

## 2021-11-29 PROCEDURE — 80048 BASIC METABOLIC PNL TOTAL CA: CPT | Performed by: NURSE PRACTITIONER

## 2021-11-29 PROCEDURE — 25000003 PHARM REV CODE 250: Performed by: STUDENT IN AN ORGANIZED HEALTH CARE EDUCATION/TRAINING PROGRAM

## 2021-11-29 PROCEDURE — 25000003 PHARM REV CODE 250: Performed by: SURGERY

## 2021-11-29 PROCEDURE — C9399 UNCLASSIFIED DRUGS OR BIOLOG: HCPCS | Performed by: INTERNAL MEDICINE

## 2021-11-29 PROCEDURE — 99232 SBSQ HOSP IP/OBS MODERATE 35: CPT | Mod: ,,, | Performed by: INTERNAL MEDICINE

## 2021-11-29 PROCEDURE — 11000001 HC ACUTE MED/SURG PRIVATE ROOM

## 2021-11-29 PROCEDURE — 99232 PR SUBSEQUENT HOSPITAL CARE,LEVL II: ICD-10-PCS | Mod: ,,, | Performed by: INTERNAL MEDICINE

## 2021-11-29 PROCEDURE — 94761 N-INVAS EAR/PLS OXIMETRY MLT: CPT

## 2021-11-29 PROCEDURE — 25000003 PHARM REV CODE 250: Performed by: NURSE PRACTITIONER

## 2021-11-29 PROCEDURE — 27000221 HC OXYGEN, UP TO 24 HOURS

## 2021-11-29 PROCEDURE — 97110 THERAPEUTIC EXERCISES: CPT

## 2021-11-29 PROCEDURE — 99900035 HC TECH TIME PER 15 MIN (STAT)

## 2021-11-29 PROCEDURE — 63600175 PHARM REV CODE 636 W HCPCS: Performed by: SURGERY

## 2021-11-29 PROCEDURE — 82962 GLUCOSE BLOOD TEST: CPT

## 2021-11-29 PROCEDURE — 36415 COLL VENOUS BLD VENIPUNCTURE: CPT | Performed by: NURSE PRACTITIONER

## 2021-11-29 PROCEDURE — 85025 COMPLETE CBC W/AUTO DIFF WBC: CPT | Performed by: NURSE PRACTITIONER

## 2021-11-29 PROCEDURE — 63600175 PHARM REV CODE 636 W HCPCS: Performed by: INTERNAL MEDICINE

## 2021-11-29 RX ORDER — HYDROMORPHONE HYDROCHLORIDE 2 MG/ML
2 INJECTION, SOLUTION INTRAMUSCULAR; INTRAVENOUS; SUBCUTANEOUS ONCE
Status: COMPLETED | OUTPATIENT
Start: 2021-11-29 | End: 2021-11-29

## 2021-11-29 RX ORDER — ENOXAPARIN SODIUM 100 MG/ML
40 INJECTION SUBCUTANEOUS EVERY 24 HOURS
Status: DISCONTINUED | OUTPATIENT
Start: 2021-11-29 | End: 2021-12-10 | Stop reason: HOSPADM

## 2021-11-29 RX ADMIN — AMLODIPINE BESYLATE 10 MG: 10 TABLET ORAL at 08:11

## 2021-11-29 RX ADMIN — METOPROLOL TARTRATE 50 MG: 50 TABLET, FILM COATED ORAL at 08:11

## 2021-11-29 RX ADMIN — PANTOPRAZOLE SODIUM 40 MG: 40 TABLET, DELAYED RELEASE ORAL at 08:11

## 2021-11-29 RX ADMIN — LOSARTAN POTASSIUM 25 MG: 25 TABLET, FILM COATED ORAL at 08:11

## 2021-11-29 RX ADMIN — MORPHINE SULFATE 6 MG: 2 INJECTION, SOLUTION INTRAMUSCULAR; INTRAVENOUS at 03:11

## 2021-11-29 RX ADMIN — Medication 2 CAPSULE: at 11:11

## 2021-11-29 RX ADMIN — INSULIN DETEMIR 10 UNITS: 100 INJECTION, SOLUTION SUBCUTANEOUS at 08:11

## 2021-11-29 RX ADMIN — SODIUM BICARBONATE 650 MG: 650 TABLET ORAL at 08:11

## 2021-11-29 RX ADMIN — Medication 2 CAPSULE: at 04:11

## 2021-11-29 RX ADMIN — MORPHINE SULFATE 6 MG: 2 INJECTION, SOLUTION INTRAMUSCULAR; INTRAVENOUS at 02:11

## 2021-11-29 RX ADMIN — MORPHINE SULFATE 6 MG: 2 INJECTION, SOLUTION INTRAMUSCULAR; INTRAVENOUS at 11:11

## 2021-11-29 RX ADMIN — ENOXAPARIN SODIUM 40 MG: 40 INJECTION SUBCUTANEOUS at 04:11

## 2021-11-29 RX ADMIN — Medication 2 CAPSULE: at 08:11

## 2021-11-29 RX ADMIN — HYDROMORPHONE HYDROCHLORIDE 2 MG: 2 INJECTION INTRAMUSCULAR; INTRAVENOUS; SUBCUTANEOUS at 04:11

## 2021-11-29 RX ADMIN — HYDROCODONE BITARTRATE AND ACETAMINOPHEN 1 TABLET: 5; 325 TABLET ORAL at 08:11

## 2021-11-29 RX ADMIN — INSULIN ASPART 10 UNITS: 100 INJECTION, SOLUTION INTRAVENOUS; SUBCUTANEOUS at 11:11

## 2021-11-29 RX ADMIN — THERA TABS 1 TABLET: TAB at 08:11

## 2021-11-29 RX ADMIN — INSULIN ASPART 14 UNITS: 100 INJECTION, SOLUTION INTRAVENOUS; SUBCUTANEOUS at 06:11

## 2021-11-29 RX ADMIN — SODIUM BICARBONATE 650 MG: 650 TABLET ORAL at 02:11

## 2021-11-29 RX ADMIN — FOLIC ACID 1 MG: 1 TABLET ORAL at 08:11

## 2021-11-29 RX ADMIN — AMPICILLIN SODIUM AND SULBACTAM SODIUM 3 G: 2; 1 INJECTION, POWDER, FOR SOLUTION INTRAMUSCULAR; INTRAVENOUS at 03:11

## 2021-11-29 RX ADMIN — AMPICILLIN SODIUM AND SULBACTAM SODIUM 3 G: 2; 1 INJECTION, POWDER, FOR SOLUTION INTRAMUSCULAR; INTRAVENOUS at 02:11

## 2021-11-30 LAB
ANION GAP SERPL CALCULATED.3IONS-SCNC: 16 MMOL/L (ref 7–16)
BASOPHILS # BLD AUTO: 0.04 K/UL (ref 0–0.2)
BASOPHILS NFR BLD AUTO: 0.2 % (ref 0–1)
BUN SERPL-MCNC: 59 MG/DL (ref 7–18)
BUN/CREAT SERPL: 17 (ref 6–20)
CALCIUM SERPL-MCNC: 8.6 MG/DL (ref 8.5–10.1)
CHLORIDE SERPL-SCNC: 109 MMOL/L (ref 98–107)
CO2 SERPL-SCNC: 16 MMOL/L (ref 21–32)
CREAT SERPL-MCNC: 3.48 MG/DL (ref 0.7–1.3)
DIFFERENTIAL METHOD BLD: ABNORMAL
EOSINOPHIL # BLD AUTO: 0.16 K/UL (ref 0–0.5)
EOSINOPHIL NFR BLD AUTO: 0.9 % (ref 1–4)
ERYTHROCYTE [DISTWIDTH] IN BLOOD BY AUTOMATED COUNT: 14.3 % (ref 11.5–14.5)
GLUCOSE SERPL-MCNC: 144 MG/DL (ref 70–105)
GLUCOSE SERPL-MCNC: 146 MG/DL (ref 70–105)
GLUCOSE SERPL-MCNC: 175 MG/DL (ref 70–105)
GLUCOSE SERPL-MCNC: 182 MG/DL (ref 74–106)
GLUCOSE SERPL-MCNC: 210 MG/DL (ref 70–105)
HCT VFR BLD AUTO: 22.8 % (ref 40–54)
HGB BLD-MCNC: 7.2 G/DL (ref 13.5–18)
IMM GRANULOCYTES # BLD AUTO: 0.16 K/UL (ref 0–0.04)
IMM GRANULOCYTES NFR BLD: 0.9 % (ref 0–0.4)
LYMPHOCYTES # BLD AUTO: 1.27 K/UL (ref 1–4.8)
LYMPHOCYTES NFR BLD AUTO: 7.5 % (ref 27–41)
MCH RBC QN AUTO: 29.8 PG (ref 27–31)
MCHC RBC AUTO-ENTMCNC: 31.6 G/DL (ref 32–36)
MCV RBC AUTO: 94.2 FL (ref 80–96)
MONOCYTES # BLD AUTO: 1.29 K/UL (ref 0–0.8)
MONOCYTES NFR BLD AUTO: 7.6 % (ref 2–6)
MPC BLD CALC-MCNC: 10.1 FL (ref 9.4–12.4)
NEUTROPHILS # BLD AUTO: 14.11 K/UL (ref 1.8–7.7)
NEUTROPHILS NFR BLD AUTO: 82.9 % (ref 53–65)
NRBC # BLD AUTO: 0.02 X10E3/UL
NRBC, AUTO (.00): 0.1 %
PLATELET # BLD AUTO: 350 K/UL (ref 150–400)
POTASSIUM SERPL-SCNC: 3.4 MMOL/L (ref 3.5–5.1)
RBC # BLD AUTO: 2.42 M/UL (ref 4.6–6.2)
SODIUM SERPL-SCNC: 138 MMOL/L (ref 136–145)
WBC # BLD AUTO: 17.03 K/UL (ref 4.5–11)

## 2021-11-30 PROCEDURE — 63600175 PHARM REV CODE 636 W HCPCS: Performed by: SURGERY

## 2021-11-30 PROCEDURE — 82962 GLUCOSE BLOOD TEST: CPT

## 2021-11-30 PROCEDURE — 99232 PR SUBSEQUENT HOSPITAL CARE,LEVL II: ICD-10-PCS | Mod: ,,, | Performed by: INTERNAL MEDICINE

## 2021-11-30 PROCEDURE — 99232 SBSQ HOSP IP/OBS MODERATE 35: CPT | Mod: ,,, | Performed by: INTERNAL MEDICINE

## 2021-11-30 PROCEDURE — 25000003 PHARM REV CODE 250: Performed by: NURSE PRACTITIONER

## 2021-11-30 PROCEDURE — 25000003 PHARM REV CODE 250: Performed by: STUDENT IN AN ORGANIZED HEALTH CARE EDUCATION/TRAINING PROGRAM

## 2021-11-30 PROCEDURE — 25000003 PHARM REV CODE 250: Performed by: INTERNAL MEDICINE

## 2021-11-30 PROCEDURE — 97535 SELF CARE MNGMENT TRAINING: CPT

## 2021-11-30 PROCEDURE — 97530 THERAPEUTIC ACTIVITIES: CPT

## 2021-11-30 PROCEDURE — 94761 N-INVAS EAR/PLS OXIMETRY MLT: CPT

## 2021-11-30 PROCEDURE — C9399 UNCLASSIFIED DRUGS OR BIOLOG: HCPCS | Performed by: INTERNAL MEDICINE

## 2021-11-30 PROCEDURE — 25000003 PHARM REV CODE 250: Performed by: SURGERY

## 2021-11-30 PROCEDURE — 11000001 HC ACUTE MED/SURG PRIVATE ROOM

## 2021-11-30 PROCEDURE — 80048 BASIC METABOLIC PNL TOTAL CA: CPT | Performed by: NURSE PRACTITIONER

## 2021-11-30 PROCEDURE — 63600175 PHARM REV CODE 636 W HCPCS: Performed by: INTERNAL MEDICINE

## 2021-11-30 PROCEDURE — 36415 COLL VENOUS BLD VENIPUNCTURE: CPT | Performed by: NURSE PRACTITIONER

## 2021-11-30 PROCEDURE — 85025 COMPLETE CBC W/AUTO DIFF WBC: CPT | Performed by: NURSE PRACTITIONER

## 2021-11-30 RX ORDER — POTASSIUM CHLORIDE 20 MEQ/1
20 TABLET, EXTENDED RELEASE ORAL ONCE
Status: COMPLETED | OUTPATIENT
Start: 2021-11-30 | End: 2021-11-30

## 2021-11-30 RX ADMIN — POTASSIUM CHLORIDE 20 MEQ: 1500 TABLET, EXTENDED RELEASE ORAL at 09:11

## 2021-11-30 RX ADMIN — Medication 2 CAPSULE: at 09:11

## 2021-11-30 RX ADMIN — AMPICILLIN SODIUM AND SULBACTAM SODIUM 3 G: 2; 1 INJECTION, POWDER, FOR SOLUTION INTRAMUSCULAR; INTRAVENOUS at 03:11

## 2021-11-30 RX ADMIN — INSULIN DETEMIR 10 UNITS: 100 INJECTION, SOLUTION SUBCUTANEOUS at 09:11

## 2021-11-30 RX ADMIN — LOSARTAN POTASSIUM 25 MG: 25 TABLET, FILM COATED ORAL at 09:11

## 2021-11-30 RX ADMIN — INSULIN ASPART 10 UNITS: 100 INJECTION, SOLUTION INTRAVENOUS; SUBCUTANEOUS at 06:11

## 2021-11-30 RX ADMIN — AMLODIPINE BESYLATE 10 MG: 10 TABLET ORAL at 09:11

## 2021-11-30 RX ADMIN — METOPROLOL TARTRATE 50 MG: 50 TABLET, FILM COATED ORAL at 09:11

## 2021-11-30 RX ADMIN — MORPHINE SULFATE 6 MG: 2 INJECTION, SOLUTION INTRAMUSCULAR; INTRAVENOUS at 04:11

## 2021-11-30 RX ADMIN — HYDROCODONE BITARTRATE AND ACETAMINOPHEN 1 TABLET: 5; 325 TABLET ORAL at 09:11

## 2021-11-30 RX ADMIN — MORPHINE SULFATE 6 MG: 2 INJECTION, SOLUTION INTRAMUSCULAR; INTRAVENOUS at 09:11

## 2021-11-30 RX ADMIN — INSULIN ASPART 14 UNITS: 100 INJECTION, SOLUTION INTRAVENOUS; SUBCUTANEOUS at 12:11

## 2021-11-30 RX ADMIN — INSULIN ASPART 5 UNITS: 100 INJECTION, SOLUTION INTRAVENOUS; SUBCUTANEOUS at 05:11

## 2021-11-30 RX ADMIN — ENOXAPARIN SODIUM 40 MG: 40 INJECTION SUBCUTANEOUS at 04:11

## 2021-11-30 RX ADMIN — FOLIC ACID 1 MG: 1 TABLET ORAL at 09:11

## 2021-11-30 RX ADMIN — SODIUM BICARBONATE 650 MG: 650 TABLET ORAL at 03:11

## 2021-11-30 RX ADMIN — SIMETHICONE 80 MG: 80 TABLET, CHEWABLE ORAL at 03:11

## 2021-11-30 RX ADMIN — Medication 2 CAPSULE: at 12:11

## 2021-11-30 RX ADMIN — COLLAGENASE SANTYL: 250 OINTMENT TOPICAL at 12:11

## 2021-11-30 RX ADMIN — THERA TABS 1 TABLET: TAB at 09:11

## 2021-11-30 RX ADMIN — Medication 2 CAPSULE: at 04:11

## 2021-11-30 RX ADMIN — SODIUM BICARBONATE 650 MG: 650 TABLET ORAL at 09:11

## 2021-11-30 RX ADMIN — PANTOPRAZOLE SODIUM 40 MG: 40 TABLET, DELAYED RELEASE ORAL at 09:11

## 2021-11-30 RX ADMIN — MORPHINE SULFATE 6 MG: 2 INJECTION, SOLUTION INTRAMUSCULAR; INTRAVENOUS at 03:11

## 2021-11-30 RX ADMIN — AMPICILLIN SODIUM AND SULBACTAM SODIUM 3 G: 2; 1 INJECTION, POWDER, FOR SOLUTION INTRAMUSCULAR; INTRAVENOUS at 09:11

## 2021-12-01 LAB
ALBUMIN SERPL BCP-MCNC: 1.2 G/DL (ref 3.5–5)
ALBUMIN/GLOB SERPL: 0.2 {RATIO}
ALP SERPL-CCNC: 165 U/L (ref 45–115)
ALT SERPL W P-5'-P-CCNC: 10 U/L (ref 16–61)
ANION GAP SERPL CALCULATED.3IONS-SCNC: 15 MMOL/L (ref 7–16)
AST SERPL W P-5'-P-CCNC: 19 U/L (ref 15–37)
BASOPHILS # BLD AUTO: 0.06 K/UL (ref 0–0.2)
BASOPHILS NFR BLD AUTO: 0.3 % (ref 0–1)
BILIRUB SERPL-MCNC: 0.3 MG/DL (ref 0–1.2)
BUN SERPL-MCNC: 55 MG/DL (ref 7–18)
BUN/CREAT SERPL: 16 (ref 6–20)
CALCIUM SERPL-MCNC: 8.7 MG/DL (ref 8.5–10.1)
CHLORIDE SERPL-SCNC: 111 MMOL/L (ref 98–107)
CO2 SERPL-SCNC: 16 MMOL/L (ref 21–32)
CREAT SERPL-MCNC: 3.38 MG/DL (ref 0.7–1.3)
CRP SERPL-MCNC: >18 MG/DL (ref 0–0.8)
DIFFERENTIAL METHOD BLD: ABNORMAL
EOSINOPHIL # BLD AUTO: 0.2 K/UL (ref 0–0.5)
EOSINOPHIL NFR BLD AUTO: 1.2 % (ref 1–4)
ERYTHROCYTE [DISTWIDTH] IN BLOOD BY AUTOMATED COUNT: 14.6 % (ref 11.5–14.5)
ERYTHROCYTE [SEDIMENTATION RATE] IN BLOOD BY WESTERGREN METHOD: 139 MM/HR (ref 0–15)
GLOBULIN SER-MCNC: 6.5 G/DL (ref 2–4)
GLUCOSE SERPL-MCNC: 101 MG/DL (ref 70–105)
GLUCOSE SERPL-MCNC: 114 MG/DL (ref 70–105)
GLUCOSE SERPL-MCNC: 137 MG/DL (ref 74–106)
GLUCOSE SERPL-MCNC: 185 MG/DL (ref 70–105)
GLUCOSE SERPL-MCNC: 219 MG/DL (ref 70–105)
HCT VFR BLD AUTO: 25.3 % (ref 40–54)
HGB BLD-MCNC: 7.5 G/DL (ref 13.5–18)
IMM GRANULOCYTES # BLD AUTO: 0.15 K/UL (ref 0–0.04)
IMM GRANULOCYTES NFR BLD: 0.9 % (ref 0–0.4)
LYMPHOCYTES # BLD AUTO: 1.39 K/UL (ref 1–4.8)
LYMPHOCYTES NFR BLD AUTO: 8 % (ref 27–41)
MCH RBC QN AUTO: 29.8 PG (ref 27–31)
MCHC RBC AUTO-ENTMCNC: 29.6 G/DL (ref 32–36)
MCV RBC AUTO: 100.4 FL (ref 80–96)
MONOCYTES # BLD AUTO: 1.43 K/UL (ref 0–0.8)
MONOCYTES NFR BLD AUTO: 8.3 % (ref 2–6)
MPC BLD CALC-MCNC: 9.7 FL (ref 9.4–12.4)
NEUTROPHILS # BLD AUTO: 14.08 K/UL (ref 1.8–7.7)
NEUTROPHILS NFR BLD AUTO: 81.3 % (ref 53–65)
NRBC # BLD AUTO: 0 X10E3/UL
NRBC, AUTO (.00): 0 %
PLATELET # BLD AUTO: 362 K/UL (ref 150–400)
POTASSIUM SERPL-SCNC: 3.4 MMOL/L (ref 3.5–5.1)
PROT SERPL-MCNC: 7.7 G/DL (ref 6.4–8.2)
RBC # BLD AUTO: 2.52 M/UL (ref 4.6–6.2)
SODIUM SERPL-SCNC: 139 MMOL/L (ref 136–145)
WBC # BLD AUTO: 17.31 K/UL (ref 4.5–11)

## 2021-12-01 PROCEDURE — 25000003 PHARM REV CODE 250: Performed by: NURSE PRACTITIONER

## 2021-12-01 PROCEDURE — 82962 GLUCOSE BLOOD TEST: CPT

## 2021-12-01 PROCEDURE — 99232 SBSQ HOSP IP/OBS MODERATE 35: CPT | Mod: ,,, | Performed by: INTERNAL MEDICINE

## 2021-12-01 PROCEDURE — 80053 COMPREHEN METABOLIC PANEL: CPT | Performed by: INTERNAL MEDICINE

## 2021-12-01 PROCEDURE — 25000003 PHARM REV CODE 250: Performed by: STUDENT IN AN ORGANIZED HEALTH CARE EDUCATION/TRAINING PROGRAM

## 2021-12-01 PROCEDURE — 25000003 PHARM REV CODE 250: Performed by: SURGERY

## 2021-12-01 PROCEDURE — 94761 N-INVAS EAR/PLS OXIMETRY MLT: CPT

## 2021-12-01 PROCEDURE — 97530 THERAPEUTIC ACTIVITIES: CPT

## 2021-12-01 PROCEDURE — 99232 PR SUBSEQUENT HOSPITAL CARE,LEVL II: ICD-10-PCS | Mod: ,,, | Performed by: INTERNAL MEDICINE

## 2021-12-01 PROCEDURE — 87070 CULTURE OTHR SPECIMN AEROBIC: CPT | Performed by: NURSE PRACTITIONER

## 2021-12-01 PROCEDURE — 11000001 HC ACUTE MED/SURG PRIVATE ROOM

## 2021-12-01 PROCEDURE — 63600175 PHARM REV CODE 636 W HCPCS: Performed by: INTERNAL MEDICINE

## 2021-12-01 PROCEDURE — 85025 COMPLETE CBC W/AUTO DIFF WBC: CPT | Performed by: INTERNAL MEDICINE

## 2021-12-01 PROCEDURE — 63600175 PHARM REV CODE 636 W HCPCS: Performed by: SURGERY

## 2021-12-01 PROCEDURE — 36415 COLL VENOUS BLD VENIPUNCTURE: CPT | Performed by: INTERNAL MEDICINE

## 2021-12-01 PROCEDURE — C9399 UNCLASSIFIED DRUGS OR BIOLOG: HCPCS | Performed by: INTERNAL MEDICINE

## 2021-12-01 PROCEDURE — 85651 RBC SED RATE NONAUTOMATED: CPT | Performed by: INTERNAL MEDICINE

## 2021-12-01 PROCEDURE — 25000003 PHARM REV CODE 250: Performed by: INTERNAL MEDICINE

## 2021-12-01 PROCEDURE — 86140 C-REACTIVE PROTEIN: CPT | Performed by: INTERNAL MEDICINE

## 2021-12-01 RX ADMIN — METOPROLOL TARTRATE 50 MG: 50 TABLET, FILM COATED ORAL at 08:12

## 2021-12-01 RX ADMIN — PANTOPRAZOLE SODIUM 40 MG: 40 TABLET, DELAYED RELEASE ORAL at 08:12

## 2021-12-01 RX ADMIN — HYDROCODONE BITARTRATE AND ACETAMINOPHEN 1 TABLET: 5; 325 TABLET ORAL at 09:12

## 2021-12-01 RX ADMIN — AMLODIPINE BESYLATE 10 MG: 10 TABLET ORAL at 08:12

## 2021-12-01 RX ADMIN — SODIUM BICARBONATE 650 MG: 650 TABLET ORAL at 08:12

## 2021-12-01 RX ADMIN — AMPICILLIN SODIUM AND SULBACTAM SODIUM 3 G: 2; 1 INJECTION, POWDER, FOR SOLUTION INTRAMUSCULAR; INTRAVENOUS at 01:12

## 2021-12-01 RX ADMIN — SODIUM BICARBONATE 650 MG: 650 TABLET ORAL at 03:12

## 2021-12-01 RX ADMIN — THERA TABS 1 TABLET: TAB at 08:12

## 2021-12-01 RX ADMIN — INSULIN DETEMIR 10 UNITS: 100 INJECTION, SOLUTION SUBCUTANEOUS at 08:12

## 2021-12-01 RX ADMIN — LOSARTAN POTASSIUM 25 MG: 25 TABLET, FILM COATED ORAL at 08:12

## 2021-12-01 RX ADMIN — SIMETHICONE 80 MG: 80 TABLET, CHEWABLE ORAL at 03:12

## 2021-12-01 RX ADMIN — INSULIN ASPART 14 UNITS: 100 INJECTION, SOLUTION INTRAVENOUS; SUBCUTANEOUS at 11:12

## 2021-12-01 RX ADMIN — AMPICILLIN SODIUM AND SULBACTAM SODIUM 3 G: 2; 1 INJECTION, POWDER, FOR SOLUTION INTRAMUSCULAR; INTRAVENOUS at 08:12

## 2021-12-01 RX ADMIN — Medication 2 CAPSULE: at 11:12

## 2021-12-01 RX ADMIN — ENOXAPARIN SODIUM 40 MG: 40 INJECTION SUBCUTANEOUS at 04:12

## 2021-12-01 RX ADMIN — MORPHINE SULFATE 6 MG: 2 INJECTION, SOLUTION INTRAMUSCULAR; INTRAVENOUS at 03:12

## 2021-12-01 RX ADMIN — Medication 2 CAPSULE: at 04:12

## 2021-12-01 RX ADMIN — INSULIN ASPART 10 UNITS: 100 INJECTION, SOLUTION INTRAVENOUS; SUBCUTANEOUS at 04:12

## 2021-12-01 RX ADMIN — Medication 2 CAPSULE: at 08:12

## 2021-12-01 RX ADMIN — FOLIC ACID 1 MG: 1 TABLET ORAL at 08:12

## 2021-12-01 RX ADMIN — MORPHINE SULFATE 6 MG: 2 INJECTION, SOLUTION INTRAMUSCULAR; INTRAVENOUS at 08:12

## 2021-12-01 RX ADMIN — AMPICILLIN SODIUM AND SULBACTAM SODIUM 3 G: 2; 1 INJECTION, POWDER, FOR SOLUTION INTRAMUSCULAR; INTRAVENOUS at 02:12

## 2021-12-02 LAB
ALBUMIN SERPL BCP-MCNC: 1.2 G/DL (ref 3.5–5)
ALBUMIN/GLOB SERPL: 0.2 {RATIO}
ALP SERPL-CCNC: 148 U/L (ref 45–115)
ALT SERPL W P-5'-P-CCNC: 12 U/L (ref 16–61)
ANION GAP SERPL CALCULATED.3IONS-SCNC: 18 MMOL/L (ref 7–16)
AST SERPL W P-5'-P-CCNC: 19 U/L (ref 15–37)
BASOPHILS # BLD AUTO: 0.03 K/UL (ref 0–0.2)
BASOPHILS NFR BLD AUTO: 0.2 % (ref 0–1)
BILIRUB SERPL-MCNC: 0.3 MG/DL (ref 0–1.2)
BUN SERPL-MCNC: 56 MG/DL (ref 7–18)
BUN/CREAT SERPL: 17 (ref 6–20)
CALCIUM SERPL-MCNC: 8.7 MG/DL (ref 8.5–10.1)
CHLORIDE SERPL-SCNC: 113 MMOL/L (ref 98–107)
CO2 SERPL-SCNC: 16 MMOL/L (ref 21–32)
CREAT SERPL-MCNC: 3.24 MG/DL (ref 0.7–1.3)
DIFFERENTIAL METHOD BLD: ABNORMAL
EOSINOPHIL # BLD AUTO: 0.22 K/UL (ref 0–0.5)
EOSINOPHIL NFR BLD AUTO: 1.7 % (ref 1–4)
ERYTHROCYTE [DISTWIDTH] IN BLOOD BY AUTOMATED COUNT: 14.2 % (ref 11.5–14.5)
GLOBULIN SER-MCNC: 6.2 G/DL (ref 2–4)
GLUCOSE SERPL-MCNC: 158 MG/DL (ref 70–105)
GLUCOSE SERPL-MCNC: 172 MG/DL (ref 70–105)
GLUCOSE SERPL-MCNC: 172 MG/DL (ref 74–106)
GLUCOSE SERPL-MCNC: 187 MG/DL (ref 70–105)
GLUCOSE SERPL-MCNC: 189 MG/DL (ref 70–105)
HCT VFR BLD AUTO: 24.6 % (ref 40–54)
HGB BLD-MCNC: 7.6 G/DL (ref 13.5–18)
IMM GRANULOCYTES # BLD AUTO: 0.14 K/UL (ref 0–0.04)
IMM GRANULOCYTES NFR BLD: 1.1 % (ref 0–0.4)
LYMPHOCYTES # BLD AUTO: 1.33 K/UL (ref 1–4.8)
LYMPHOCYTES NFR BLD AUTO: 10 % (ref 27–41)
MCH RBC QN AUTO: 30 PG (ref 27–31)
MCHC RBC AUTO-ENTMCNC: 30.9 G/DL (ref 32–36)
MCV RBC AUTO: 97.2 FL (ref 80–96)
MONOCYTES # BLD AUTO: 1.27 K/UL (ref 0–0.8)
MONOCYTES NFR BLD AUTO: 9.5 % (ref 2–6)
MPC BLD CALC-MCNC: 10 FL (ref 9.4–12.4)
NEUTROPHILS # BLD AUTO: 10.32 K/UL (ref 1.8–7.7)
NEUTROPHILS NFR BLD AUTO: 77.5 % (ref 53–65)
NRBC # BLD AUTO: 0 X10E3/UL
NRBC, AUTO (.00): 0 %
PLATELET # BLD AUTO: 358 K/UL (ref 150–400)
POTASSIUM SERPL-SCNC: 3.4 MMOL/L (ref 3.5–5.1)
PROT SERPL-MCNC: 7.4 G/DL (ref 6.4–8.2)
RBC # BLD AUTO: 2.53 M/UL (ref 4.6–6.2)
SODIUM SERPL-SCNC: 144 MMOL/L (ref 136–145)
WBC # BLD AUTO: 13.31 K/UL (ref 4.5–11)

## 2021-12-02 PROCEDURE — 25000003 PHARM REV CODE 250: Performed by: INTERNAL MEDICINE

## 2021-12-02 PROCEDURE — 99232 SBSQ HOSP IP/OBS MODERATE 35: CPT | Mod: ,,, | Performed by: INTERNAL MEDICINE

## 2021-12-02 PROCEDURE — 63600175 PHARM REV CODE 636 W HCPCS: Performed by: INTERNAL MEDICINE

## 2021-12-02 PROCEDURE — 25000003 PHARM REV CODE 250: Performed by: STUDENT IN AN ORGANIZED HEALTH CARE EDUCATION/TRAINING PROGRAM

## 2021-12-02 PROCEDURE — 25000003 PHARM REV CODE 250: Performed by: NURSE PRACTITIONER

## 2021-12-02 PROCEDURE — 97530 THERAPEUTIC ACTIVITIES: CPT

## 2021-12-02 PROCEDURE — 25000003 PHARM REV CODE 250: Performed by: SURGERY

## 2021-12-02 PROCEDURE — 97116 GAIT TRAINING THERAPY: CPT

## 2021-12-02 PROCEDURE — 99232 PR SUBSEQUENT HOSPITAL CARE,LEVL II: ICD-10-PCS | Mod: ,,, | Performed by: INTERNAL MEDICINE

## 2021-12-02 PROCEDURE — 99233 PR SUBSEQUENT HOSPITAL CARE,LEVL III: ICD-10-PCS | Mod: ,,, | Performed by: INTERNAL MEDICINE

## 2021-12-02 PROCEDURE — C9399 UNCLASSIFIED DRUGS OR BIOLOG: HCPCS | Performed by: INTERNAL MEDICINE

## 2021-12-02 PROCEDURE — 82962 GLUCOSE BLOOD TEST: CPT

## 2021-12-02 PROCEDURE — 80053 COMPREHEN METABOLIC PANEL: CPT | Performed by: INTERNAL MEDICINE

## 2021-12-02 PROCEDURE — 94761 N-INVAS EAR/PLS OXIMETRY MLT: CPT

## 2021-12-02 PROCEDURE — 96372 THER/PROPH/DIAG INJ SC/IM: CPT

## 2021-12-02 PROCEDURE — 63600175 PHARM REV CODE 636 W HCPCS: Performed by: SURGERY

## 2021-12-02 PROCEDURE — 85025 COMPLETE CBC W/AUTO DIFF WBC: CPT | Performed by: INTERNAL MEDICINE

## 2021-12-02 PROCEDURE — 97535 SELF CARE MNGMENT TRAINING: CPT

## 2021-12-02 PROCEDURE — 11000001 HC ACUTE MED/SURG PRIVATE ROOM

## 2021-12-02 PROCEDURE — 36415 COLL VENOUS BLD VENIPUNCTURE: CPT | Performed by: INTERNAL MEDICINE

## 2021-12-02 PROCEDURE — 99233 SBSQ HOSP IP/OBS HIGH 50: CPT | Mod: ,,, | Performed by: INTERNAL MEDICINE

## 2021-12-02 RX ORDER — POTASSIUM CHLORIDE 750 MG/1
30 TABLET, EXTENDED RELEASE ORAL ONCE
Status: COMPLETED | OUTPATIENT
Start: 2021-12-02 | End: 2021-12-02

## 2021-12-02 RX ADMIN — THERA TABS 1 TABLET: TAB at 08:12

## 2021-12-02 RX ADMIN — MORPHINE SULFATE 6 MG: 2 INJECTION, SOLUTION INTRAMUSCULAR; INTRAVENOUS at 03:12

## 2021-12-02 RX ADMIN — FOLIC ACID 1 MG: 1 TABLET ORAL at 08:12

## 2021-12-02 RX ADMIN — PIPERACILLIN AND TAZOBACTAM 4.5 G: 4; .5 INJECTION, POWDER, LYOPHILIZED, FOR SOLUTION INTRAVENOUS; PARENTERAL at 03:12

## 2021-12-02 RX ADMIN — SODIUM BICARBONATE 650 MG: 650 TABLET ORAL at 08:12

## 2021-12-02 RX ADMIN — METOPROLOL TARTRATE 50 MG: 50 TABLET, FILM COATED ORAL at 09:12

## 2021-12-02 RX ADMIN — LOSARTAN POTASSIUM 25 MG: 25 TABLET, FILM COATED ORAL at 08:12

## 2021-12-02 RX ADMIN — Medication 2 CAPSULE: at 12:12

## 2021-12-02 RX ADMIN — INSULIN DETEMIR 10 UNITS: 100 INJECTION, SOLUTION SUBCUTANEOUS at 09:12

## 2021-12-02 RX ADMIN — INSULIN ASPART 10 UNITS: 100 INJECTION, SOLUTION INTRAVENOUS; SUBCUTANEOUS at 12:12

## 2021-12-02 RX ADMIN — HYDROCODONE BITARTRATE AND ACETAMINOPHEN 1 TABLET: 5; 325 TABLET ORAL at 08:12

## 2021-12-02 RX ADMIN — POTASSIUM CHLORIDE 30 MEQ: 750 TABLET, FILM COATED, EXTENDED RELEASE ORAL at 09:12

## 2021-12-02 RX ADMIN — AMPICILLIN SODIUM AND SULBACTAM SODIUM 3 G: 2; 1 INJECTION, POWDER, FOR SOLUTION INTRAMUSCULAR; INTRAVENOUS at 01:12

## 2021-12-02 RX ADMIN — METOPROLOL TARTRATE 50 MG: 50 TABLET, FILM COATED ORAL at 08:12

## 2021-12-02 RX ADMIN — AMPICILLIN SODIUM AND SULBACTAM SODIUM 3 G: 2; 1 INJECTION, POWDER, FOR SOLUTION INTRAMUSCULAR; INTRAVENOUS at 08:12

## 2021-12-02 RX ADMIN — MORPHINE SULFATE 6 MG: 2 INJECTION, SOLUTION INTRAMUSCULAR; INTRAVENOUS at 09:12

## 2021-12-02 RX ADMIN — INSULIN ASPART 5 UNITS: 100 INJECTION, SOLUTION INTRAVENOUS; SUBCUTANEOUS at 06:12

## 2021-12-02 RX ADMIN — SODIUM BICARBONATE 650 MG: 650 TABLET ORAL at 03:12

## 2021-12-02 RX ADMIN — Medication 2 CAPSULE: at 08:12

## 2021-12-02 RX ADMIN — COLLAGENASE SANTYL: 250 OINTMENT TOPICAL at 09:12

## 2021-12-02 RX ADMIN — PIPERACILLIN AND TAZOBACTAM 4.5 G: 4; .5 INJECTION, POWDER, LYOPHILIZED, FOR SOLUTION INTRAVENOUS at 09:12

## 2021-12-02 RX ADMIN — SODIUM BICARBONATE 650 MG: 650 TABLET ORAL at 09:12

## 2021-12-02 RX ADMIN — PANTOPRAZOLE SODIUM 40 MG: 40 TABLET, DELAYED RELEASE ORAL at 08:12

## 2021-12-02 RX ADMIN — AMLODIPINE BESYLATE 10 MG: 10 TABLET ORAL at 08:12

## 2021-12-03 LAB
ALBUMIN SERPL BCP-MCNC: 1.4 G/DL (ref 3.5–5)
ALBUMIN/GLOB SERPL: 0.2 {RATIO}
ALP SERPL-CCNC: 147 U/L (ref 45–115)
ALT SERPL W P-5'-P-CCNC: 13 U/L (ref 16–61)
ANION GAP SERPL CALCULATED.3IONS-SCNC: 17 MMOL/L (ref 7–16)
AST SERPL W P-5'-P-CCNC: 16 U/L (ref 15–37)
BASOPHILS # BLD AUTO: 0.07 K/UL (ref 0–0.2)
BASOPHILS NFR BLD AUTO: 0.5 % (ref 0–1)
BILIRUB SERPL-MCNC: 0.3 MG/DL (ref 0–1.2)
BUN SERPL-MCNC: 50 MG/DL (ref 7–18)
BUN/CREAT SERPL: 15 (ref 6–20)
CALCIUM SERPL-MCNC: 9 MG/DL (ref 8.5–10.1)
CHLORIDE SERPL-SCNC: 109 MMOL/L (ref 98–107)
CO2 SERPL-SCNC: 19 MMOL/L (ref 21–32)
CREAT SERPL-MCNC: 3.25 MG/DL (ref 0.7–1.3)
DIFFERENTIAL METHOD BLD: ABNORMAL
EOSINOPHIL # BLD AUTO: 0.33 K/UL (ref 0–0.5)
EOSINOPHIL NFR BLD AUTO: 2.4 % (ref 1–4)
ERYTHROCYTE [DISTWIDTH] IN BLOOD BY AUTOMATED COUNT: 14.3 % (ref 11.5–14.5)
GLOBULIN SER-MCNC: 7 G/DL (ref 2–4)
GLUCOSE SERPL-MCNC: 162 MG/DL (ref 70–105)
GLUCOSE SERPL-MCNC: 177 MG/DL (ref 74–106)
GLUCOSE SERPL-MCNC: 188 MG/DL (ref 70–105)
GLUCOSE SERPL-MCNC: 192 MG/DL (ref 70–105)
GLUCOSE SERPL-MCNC: 214 MG/DL (ref 70–105)
HCT VFR BLD AUTO: 26.6 % (ref 40–54)
HGB BLD-MCNC: 7.7 G/DL (ref 13.5–18)
HIV 1+O+2 AB SERPL QL: NORMAL
IMM GRANULOCYTES # BLD AUTO: 0.17 K/UL (ref 0–0.04)
IMM GRANULOCYTES NFR BLD: 1.2 % (ref 0–0.4)
LYMPHOCYTES # BLD AUTO: 1.32 K/UL (ref 1–4.8)
LYMPHOCYTES NFR BLD AUTO: 9.6 % (ref 27–41)
MCH RBC QN AUTO: 28.8 PG (ref 27–31)
MCHC RBC AUTO-ENTMCNC: 28.9 G/DL (ref 32–36)
MCV RBC AUTO: 99.6 FL (ref 80–96)
MICROORGANISM SPEC CULT: ABNORMAL
MICROORGANISM SPEC CULT: ABNORMAL
MONOCYTES # BLD AUTO: 0.99 K/UL (ref 0–0.8)
MONOCYTES NFR BLD AUTO: 7.2 % (ref 2–6)
MPC BLD CALC-MCNC: 9.8 FL (ref 9.4–12.4)
NEUTROPHILS # BLD AUTO: 10.92 K/UL (ref 1.8–7.7)
NEUTROPHILS NFR BLD AUTO: 79.1 % (ref 53–65)
NRBC # BLD AUTO: 0 X10E3/UL
NRBC, AUTO (.00): 0 %
PLATELET # BLD AUTO: 420 K/UL (ref 150–400)
POTASSIUM SERPL-SCNC: 3.3 MMOL/L (ref 3.5–5.1)
PROT SERPL-MCNC: 8.4 G/DL (ref 6.4–8.2)
RBC # BLD AUTO: 2.67 M/UL (ref 4.6–6.2)
SODIUM SERPL-SCNC: 142 MMOL/L (ref 136–145)
WBC # BLD AUTO: 13.8 K/UL (ref 4.5–11)

## 2021-12-03 PROCEDURE — 25000003 PHARM REV CODE 250: Performed by: INTERNAL MEDICINE

## 2021-12-03 PROCEDURE — 36415 COLL VENOUS BLD VENIPUNCTURE: CPT | Performed by: INTERNAL MEDICINE

## 2021-12-03 PROCEDURE — 25000003 PHARM REV CODE 250: Performed by: NURSE PRACTITIONER

## 2021-12-03 PROCEDURE — 63600175 PHARM REV CODE 636 W HCPCS: Performed by: INTERNAL MEDICINE

## 2021-12-03 PROCEDURE — 99232 SBSQ HOSP IP/OBS MODERATE 35: CPT | Mod: ,,, | Performed by: INTERNAL MEDICINE

## 2021-12-03 PROCEDURE — 97535 SELF CARE MNGMENT TRAINING: CPT

## 2021-12-03 PROCEDURE — 87389 HIV-1 AG W/HIV-1&-2 AB AG IA: CPT | Performed by: INTERNAL MEDICINE

## 2021-12-03 PROCEDURE — 63600175 PHARM REV CODE 636 W HCPCS: Performed by: SURGERY

## 2021-12-03 PROCEDURE — 99232 PR SUBSEQUENT HOSPITAL CARE,LEVL II: ICD-10-PCS | Mod: ,,, | Performed by: INTERNAL MEDICINE

## 2021-12-03 PROCEDURE — 25000003 PHARM REV CODE 250: Performed by: STUDENT IN AN ORGANIZED HEALTH CARE EDUCATION/TRAINING PROGRAM

## 2021-12-03 PROCEDURE — 99900035 HC TECH TIME PER 15 MIN (STAT)

## 2021-12-03 PROCEDURE — 80053 COMPREHEN METABOLIC PANEL: CPT | Performed by: INTERNAL MEDICINE

## 2021-12-03 PROCEDURE — 25000003 PHARM REV CODE 250: Performed by: SURGERY

## 2021-12-03 PROCEDURE — 82962 GLUCOSE BLOOD TEST: CPT

## 2021-12-03 PROCEDURE — 85025 COMPLETE CBC W/AUTO DIFF WBC: CPT | Performed by: INTERNAL MEDICINE

## 2021-12-03 PROCEDURE — C9399 UNCLASSIFIED DRUGS OR BIOLOG: HCPCS | Performed by: INTERNAL MEDICINE

## 2021-12-03 PROCEDURE — 11000001 HC ACUTE MED/SURG PRIVATE ROOM

## 2021-12-03 PROCEDURE — 97116 GAIT TRAINING THERAPY: CPT | Mod: CQ

## 2021-12-03 RX ORDER — POTASSIUM CHLORIDE 750 MG/1
30 TABLET, EXTENDED RELEASE ORAL ONCE
Status: COMPLETED | OUTPATIENT
Start: 2021-12-03 | End: 2021-12-03

## 2021-12-03 RX ADMIN — AMLODIPINE BESYLATE 10 MG: 10 TABLET ORAL at 10:12

## 2021-12-03 RX ADMIN — Medication 2 CAPSULE: at 04:12

## 2021-12-03 RX ADMIN — SODIUM BICARBONATE 650 MG: 650 TABLET ORAL at 10:12

## 2021-12-03 RX ADMIN — POTASSIUM CHLORIDE 30 MEQ: 750 TABLET, FILM COATED, EXTENDED RELEASE ORAL at 10:12

## 2021-12-03 RX ADMIN — SODIUM BICARBONATE 650 MG: 650 TABLET ORAL at 09:12

## 2021-12-03 RX ADMIN — PIPERACILLIN AND TAZOBACTAM 4.5 G: 4; .5 INJECTION, POWDER, LYOPHILIZED, FOR SOLUTION INTRAVENOUS at 12:12

## 2021-12-03 RX ADMIN — FOLIC ACID 1 MG: 1 TABLET ORAL at 10:12

## 2021-12-03 RX ADMIN — MORPHINE SULFATE 6 MG: 2 INJECTION, SOLUTION INTRAMUSCULAR; INTRAVENOUS at 03:12

## 2021-12-03 RX ADMIN — INSULIN ASPART 10 UNITS: 100 INJECTION, SOLUTION INTRAVENOUS; SUBCUTANEOUS at 06:12

## 2021-12-03 RX ADMIN — Medication 2 CAPSULE: at 12:12

## 2021-12-03 RX ADMIN — INSULIN ASPART 5 UNITS: 100 INJECTION, SOLUTION INTRAVENOUS; SUBCUTANEOUS at 12:12

## 2021-12-03 RX ADMIN — THERA TABS 1 TABLET: TAB at 10:12

## 2021-12-03 RX ADMIN — LOSARTAN POTASSIUM 25 MG: 25 TABLET, FILM COATED ORAL at 10:12

## 2021-12-03 RX ADMIN — METOPROLOL TARTRATE 50 MG: 50 TABLET, FILM COATED ORAL at 10:12

## 2021-12-03 RX ADMIN — HYDROCODONE BITARTRATE AND ACETAMINOPHEN 1 TABLET: 5; 325 TABLET ORAL at 10:12

## 2021-12-03 RX ADMIN — Medication 2 CAPSULE: at 10:12

## 2021-12-03 RX ADMIN — INSULIN DETEMIR 10 UNITS: 100 INJECTION, SOLUTION SUBCUTANEOUS at 09:12

## 2021-12-03 RX ADMIN — COLLAGENASE SANTYL: 250 OINTMENT TOPICAL at 10:12

## 2021-12-03 RX ADMIN — ENOXAPARIN SODIUM 40 MG: 40 INJECTION SUBCUTANEOUS at 04:12

## 2021-12-03 RX ADMIN — PIPERACILLIN AND TAZOBACTAM 4.5 G: 4; .5 INJECTION, POWDER, LYOPHILIZED, FOR SOLUTION INTRAVENOUS at 03:12

## 2021-12-03 RX ADMIN — METOPROLOL TARTRATE 50 MG: 50 TABLET, FILM COATED ORAL at 09:12

## 2021-12-03 RX ADMIN — PANTOPRAZOLE SODIUM 40 MG: 40 TABLET, DELAYED RELEASE ORAL at 10:12

## 2021-12-03 RX ADMIN — MORPHINE SULFATE 6 MG: 2 INJECTION, SOLUTION INTRAMUSCULAR; INTRAVENOUS at 09:12

## 2021-12-03 RX ADMIN — PIPERACILLIN AND TAZOBACTAM 4.5 G: 4; .5 INJECTION, POWDER, LYOPHILIZED, FOR SOLUTION INTRAVENOUS at 09:12

## 2021-12-04 LAB
ALBUMIN SERPL BCP-MCNC: 1.3 G/DL (ref 3.5–5)
ALBUMIN/GLOB SERPL: 0.2 {RATIO}
ALP SERPL-CCNC: 132 U/L (ref 45–115)
ALT SERPL W P-5'-P-CCNC: 13 U/L (ref 16–61)
ANION GAP SERPL CALCULATED.3IONS-SCNC: 15 MMOL/L (ref 7–16)
AST SERPL W P-5'-P-CCNC: 15 U/L (ref 15–37)
BASOPHILS # BLD AUTO: 0.04 K/UL (ref 0–0.2)
BASOPHILS NFR BLD AUTO: 0.4 % (ref 0–1)
BILIRUB SERPL-MCNC: 0.3 MG/DL (ref 0–1.2)
BUN SERPL-MCNC: 44 MG/DL (ref 7–18)
BUN/CREAT SERPL: 14 (ref 6–20)
CALCIUM SERPL-MCNC: 8.4 MG/DL (ref 8.5–10.1)
CHLORIDE SERPL-SCNC: 110 MMOL/L (ref 98–107)
CO2 SERPL-SCNC: 18 MMOL/L (ref 21–32)
CREAT SERPL-MCNC: 3.25 MG/DL (ref 0.7–1.3)
DIFFERENTIAL METHOD BLD: ABNORMAL
EOSINOPHIL # BLD AUTO: 0.3 K/UL (ref 0–0.5)
EOSINOPHIL NFR BLD AUTO: 2.6 % (ref 1–4)
ERYTHROCYTE [DISTWIDTH] IN BLOOD BY AUTOMATED COUNT: 14.2 % (ref 11.5–14.5)
GLOBULIN SER-MCNC: 6.5 G/DL (ref 2–4)
GLUCOSE SERPL-MCNC: 168 MG/DL (ref 70–105)
GLUCOSE SERPL-MCNC: 211 MG/DL (ref 70–105)
GLUCOSE SERPL-MCNC: 247 MG/DL (ref 74–106)
GLUCOSE SERPL-MCNC: 277 MG/DL (ref 70–105)
GLUCOSE SERPL-MCNC: 61 MG/DL (ref 70–105)
GLUCOSE SERPL-MCNC: 74 MG/DL (ref 70–105)
HCT VFR BLD AUTO: 24.1 % (ref 40–54)
HGB BLD-MCNC: 7.3 G/DL (ref 13.5–18)
IMM GRANULOCYTES # BLD AUTO: 0.1 K/UL (ref 0–0.04)
IMM GRANULOCYTES NFR BLD: 0.9 % (ref 0–0.4)
LYMPHOCYTES # BLD AUTO: 1.06 K/UL (ref 1–4.8)
LYMPHOCYTES NFR BLD AUTO: 9.4 % (ref 27–41)
MCH RBC QN AUTO: 29.4 PG (ref 27–31)
MCHC RBC AUTO-ENTMCNC: 30.3 G/DL (ref 32–36)
MCV RBC AUTO: 97.2 FL (ref 80–96)
MONOCYTES # BLD AUTO: 0.78 K/UL (ref 0–0.8)
MONOCYTES NFR BLD AUTO: 6.9 % (ref 2–6)
MPC BLD CALC-MCNC: 9.9 FL (ref 9.4–12.4)
NEUTROPHILS # BLD AUTO: 9.05 K/UL (ref 1.8–7.7)
NEUTROPHILS NFR BLD AUTO: 79.8 % (ref 53–65)
NRBC # BLD AUTO: 0 X10E3/UL
NRBC, AUTO (.00): 0 %
PLATELET # BLD AUTO: 380 K/UL (ref 150–400)
POTASSIUM SERPL-SCNC: 3.5 MMOL/L (ref 3.5–5.1)
PROT SERPL-MCNC: 7.8 G/DL (ref 6.4–8.2)
RBC # BLD AUTO: 2.48 M/UL (ref 4.6–6.2)
SODIUM SERPL-SCNC: 139 MMOL/L (ref 136–145)
WBC # BLD AUTO: 11.33 K/UL (ref 4.5–11)

## 2021-12-04 PROCEDURE — 63600175 PHARM REV CODE 636 W HCPCS: Performed by: INTERNAL MEDICINE

## 2021-12-04 PROCEDURE — 25000003 PHARM REV CODE 250: Performed by: STUDENT IN AN ORGANIZED HEALTH CARE EDUCATION/TRAINING PROGRAM

## 2021-12-04 PROCEDURE — 99232 PR SUBSEQUENT HOSPITAL CARE,LEVL II: ICD-10-PCS | Mod: ,,, | Performed by: INTERNAL MEDICINE

## 2021-12-04 PROCEDURE — 63600175 PHARM REV CODE 636 W HCPCS: Performed by: STUDENT IN AN ORGANIZED HEALTH CARE EDUCATION/TRAINING PROGRAM

## 2021-12-04 PROCEDURE — 36415 COLL VENOUS BLD VENIPUNCTURE: CPT | Performed by: INTERNAL MEDICINE

## 2021-12-04 PROCEDURE — 11000001 HC ACUTE MED/SURG PRIVATE ROOM

## 2021-12-04 PROCEDURE — C9399 UNCLASSIFIED DRUGS OR BIOLOG: HCPCS | Performed by: INTERNAL MEDICINE

## 2021-12-04 PROCEDURE — 99232 SBSQ HOSP IP/OBS MODERATE 35: CPT | Mod: ,,, | Performed by: INTERNAL MEDICINE

## 2021-12-04 PROCEDURE — 82962 GLUCOSE BLOOD TEST: CPT

## 2021-12-04 PROCEDURE — 25000003 PHARM REV CODE 250: Performed by: INTERNAL MEDICINE

## 2021-12-04 PROCEDURE — 85025 COMPLETE CBC W/AUTO DIFF WBC: CPT | Performed by: INTERNAL MEDICINE

## 2021-12-04 PROCEDURE — 25000003 PHARM REV CODE 250: Performed by: SURGERY

## 2021-12-04 PROCEDURE — 80053 COMPREHEN METABOLIC PANEL: CPT | Performed by: INTERNAL MEDICINE

## 2021-12-04 PROCEDURE — 25000003 PHARM REV CODE 250: Performed by: NURSE PRACTITIONER

## 2021-12-04 PROCEDURE — 63600175 PHARM REV CODE 636 W HCPCS: Performed by: SURGERY

## 2021-12-04 RX ADMIN — SODIUM BICARBONATE 650 MG: 650 TABLET ORAL at 05:12

## 2021-12-04 RX ADMIN — INSULIN ASPART 17 UNITS: 100 INJECTION, SOLUTION INTRAVENOUS; SUBCUTANEOUS at 07:12

## 2021-12-04 RX ADMIN — Medication 2 CAPSULE: at 09:12

## 2021-12-04 RX ADMIN — MORPHINE SULFATE 6 MG: 2 INJECTION, SOLUTION INTRAMUSCULAR; INTRAVENOUS at 02:12

## 2021-12-04 RX ADMIN — Medication 2 CAPSULE: at 12:12

## 2021-12-04 RX ADMIN — PIPERACILLIN AND TAZOBACTAM 4.5 G: 4; .5 INJECTION, POWDER, LYOPHILIZED, FOR SOLUTION INTRAVENOUS at 11:12

## 2021-12-04 RX ADMIN — ENOXAPARIN SODIUM 40 MG: 40 INJECTION SUBCUTANEOUS at 05:12

## 2021-12-04 RX ADMIN — METOPROLOL TARTRATE 50 MG: 50 TABLET, FILM COATED ORAL at 09:12

## 2021-12-04 RX ADMIN — THERA TABS 1 TABLET: TAB at 09:12

## 2021-12-04 RX ADMIN — MORPHINE SULFATE 6 MG: 2 INJECTION, SOLUTION INTRAMUSCULAR; INTRAVENOUS at 05:12

## 2021-12-04 RX ADMIN — AMLODIPINE BESYLATE 10 MG: 10 TABLET ORAL at 09:12

## 2021-12-04 RX ADMIN — TRAZODONE HYDROCHLORIDE 50 MG: 50 TABLET ORAL at 09:12

## 2021-12-04 RX ADMIN — PIPERACILLIN AND TAZOBACTAM 4.5 G: 4; .5 INJECTION, POWDER, LYOPHILIZED, FOR SOLUTION INTRAVENOUS at 04:12

## 2021-12-04 RX ADMIN — SODIUM BICARBONATE 650 MG: 650 TABLET ORAL at 09:12

## 2021-12-04 RX ADMIN — FOLIC ACID 1 MG: 1 TABLET ORAL at 09:12

## 2021-12-04 RX ADMIN — PIPERACILLIN AND TAZOBACTAM 4.5 G: 4; .5 INJECTION, POWDER, LYOPHILIZED, FOR SOLUTION INTRAVENOUS at 09:12

## 2021-12-04 RX ADMIN — INSULIN DETEMIR 15 UNITS: 100 INJECTION, SOLUTION SUBCUTANEOUS at 09:12

## 2021-12-04 RX ADMIN — Medication 2 CAPSULE: at 05:12

## 2021-12-04 RX ADMIN — LOSARTAN POTASSIUM 25 MG: 25 TABLET, FILM COATED ORAL at 09:12

## 2021-12-04 RX ADMIN — PANTOPRAZOLE SODIUM 40 MG: 40 TABLET, DELAYED RELEASE ORAL at 09:12

## 2021-12-04 RX ADMIN — MORPHINE SULFATE 6 MG: 2 INJECTION, SOLUTION INTRAMUSCULAR; INTRAVENOUS at 09:12

## 2021-12-04 RX ADMIN — ONDANSETRON 8 MG: 2 INJECTION INTRAMUSCULAR; INTRAVENOUS at 02:12

## 2021-12-04 RX ADMIN — HYDROCODONE BITARTRATE AND ACETAMINOPHEN 1 TABLET: 5; 325 TABLET ORAL at 09:12

## 2021-12-04 RX ADMIN — INSULIN ASPART 14 UNITS: 100 INJECTION, SOLUTION INTRAVENOUS; SUBCUTANEOUS at 05:12

## 2021-12-05 LAB
ALBUMIN SERPL BCP-MCNC: 1.3 G/DL (ref 3.5–5)
ALBUMIN/GLOB SERPL: 0.2 {RATIO}
ALP SERPL-CCNC: 142 U/L (ref 45–115)
ALT SERPL W P-5'-P-CCNC: 15 U/L (ref 16–61)
ANION GAP SERPL CALCULATED.3IONS-SCNC: 18 MMOL/L (ref 7–16)
AST SERPL W P-5'-P-CCNC: 17 U/L (ref 15–37)
BASOPHILS # BLD AUTO: 0.03 K/UL (ref 0–0.2)
BASOPHILS NFR BLD AUTO: 0.3 % (ref 0–1)
BILIRUB SERPL-MCNC: 0.3 MG/DL (ref 0–1.2)
BUN SERPL-MCNC: 42 MG/DL (ref 7–18)
BUN/CREAT SERPL: 12 (ref 6–20)
CALCIUM SERPL-MCNC: 8.3 MG/DL (ref 8.5–10.1)
CHLORIDE SERPL-SCNC: 110 MMOL/L (ref 98–107)
CO2 SERPL-SCNC: 18 MMOL/L (ref 21–32)
CREAT SERPL-MCNC: 3.38 MG/DL (ref 0.7–1.3)
DIFFERENTIAL METHOD BLD: ABNORMAL
EOSINOPHIL # BLD AUTO: 0.24 K/UL (ref 0–0.5)
EOSINOPHIL NFR BLD AUTO: 2.5 % (ref 1–4)
ERYTHROCYTE [DISTWIDTH] IN BLOOD BY AUTOMATED COUNT: 14.3 % (ref 11.5–14.5)
GLOBULIN SER-MCNC: 6.2 G/DL (ref 2–4)
GLUCOSE SERPL-MCNC: 126 MG/DL (ref 70–105)
GLUCOSE SERPL-MCNC: 131 MG/DL (ref 70–105)
GLUCOSE SERPL-MCNC: 188 MG/DL (ref 70–105)
GLUCOSE SERPL-MCNC: 190 MG/DL (ref 74–106)
GLUCOSE SERPL-MCNC: 217 MG/DL (ref 70–105)
HCT VFR BLD AUTO: 23.9 % (ref 40–54)
HGB BLD-MCNC: 6.9 G/DL (ref 13.5–18)
IMM GRANULOCYTES # BLD AUTO: 0.08 K/UL (ref 0–0.04)
IMM GRANULOCYTES NFR BLD: 0.8 % (ref 0–0.4)
LYMPHOCYTES # BLD AUTO: 1.04 K/UL (ref 1–4.8)
LYMPHOCYTES NFR BLD AUTO: 10.9 % (ref 27–41)
MCH RBC QN AUTO: 29.1 PG (ref 27–31)
MCHC RBC AUTO-ENTMCNC: 28.9 G/DL (ref 32–36)
MCV RBC AUTO: 100.8 FL (ref 80–96)
MONOCYTES # BLD AUTO: 0.64 K/UL (ref 0–0.8)
MONOCYTES NFR BLD AUTO: 6.7 % (ref 2–6)
MPC BLD CALC-MCNC: 10.2 FL (ref 9.4–12.4)
NEUTROPHILS # BLD AUTO: 7.52 K/UL (ref 1.8–7.7)
NEUTROPHILS NFR BLD AUTO: 78.8 % (ref 53–65)
NRBC # BLD AUTO: 0 X10E3/UL
NRBC, AUTO (.00): 0 %
PLATELET # BLD AUTO: 360 K/UL (ref 150–400)
POTASSIUM SERPL-SCNC: 3.7 MMOL/L (ref 3.5–5.1)
PROT SERPL-MCNC: 7.5 G/DL (ref 6.4–8.2)
RBC # BLD AUTO: 2.37 M/UL (ref 4.6–6.2)
SODIUM SERPL-SCNC: 142 MMOL/L (ref 136–145)
WBC # BLD AUTO: 9.55 K/UL (ref 4.5–11)

## 2021-12-05 PROCEDURE — 85025 COMPLETE CBC W/AUTO DIFF WBC: CPT | Performed by: INTERNAL MEDICINE

## 2021-12-05 PROCEDURE — 25000003 PHARM REV CODE 250: Performed by: SURGERY

## 2021-12-05 PROCEDURE — 63600175 PHARM REV CODE 636 W HCPCS: Performed by: INTERNAL MEDICINE

## 2021-12-05 PROCEDURE — 96372 THER/PROPH/DIAG INJ SC/IM: CPT

## 2021-12-05 PROCEDURE — 99232 PR SUBSEQUENT HOSPITAL CARE,LEVL II: ICD-10-PCS | Mod: ,,, | Performed by: INTERNAL MEDICINE

## 2021-12-05 PROCEDURE — 82962 GLUCOSE BLOOD TEST: CPT

## 2021-12-05 PROCEDURE — 36415 COLL VENOUS BLD VENIPUNCTURE: CPT | Performed by: INTERNAL MEDICINE

## 2021-12-05 PROCEDURE — 99232 SBSQ HOSP IP/OBS MODERATE 35: CPT | Mod: ,,, | Performed by: INTERNAL MEDICINE

## 2021-12-05 PROCEDURE — C9399 UNCLASSIFIED DRUGS OR BIOLOG: HCPCS | Performed by: INTERNAL MEDICINE

## 2021-12-05 PROCEDURE — 11000001 HC ACUTE MED/SURG PRIVATE ROOM

## 2021-12-05 PROCEDURE — 25000003 PHARM REV CODE 250: Performed by: NURSE PRACTITIONER

## 2021-12-05 PROCEDURE — 80053 COMPREHEN METABOLIC PANEL: CPT | Performed by: INTERNAL MEDICINE

## 2021-12-05 PROCEDURE — 25000003 PHARM REV CODE 250: Performed by: STUDENT IN AN ORGANIZED HEALTH CARE EDUCATION/TRAINING PROGRAM

## 2021-12-05 PROCEDURE — 25000003 PHARM REV CODE 250: Performed by: INTERNAL MEDICINE

## 2021-12-05 RX ADMIN — COLLAGENASE SANTYL: 250 OINTMENT TOPICAL at 03:12

## 2021-12-05 RX ADMIN — SODIUM BICARBONATE 650 MG: 650 TABLET ORAL at 09:12

## 2021-12-05 RX ADMIN — Medication 2 CAPSULE: at 04:12

## 2021-12-05 RX ADMIN — PIPERACILLIN AND TAZOBACTAM 4.5 G: 4; .5 INJECTION, POWDER, LYOPHILIZED, FOR SOLUTION INTRAVENOUS at 03:12

## 2021-12-05 RX ADMIN — THERA TABS 1 TABLET: TAB at 09:12

## 2021-12-05 RX ADMIN — PIPERACILLIN AND TAZOBACTAM 4.5 G: 4; .5 INJECTION, POWDER, LYOPHILIZED, FOR SOLUTION INTRAVENOUS at 12:12

## 2021-12-05 RX ADMIN — HYDROCODONE BITARTRATE AND ACETAMINOPHEN 1 TABLET: 5; 325 TABLET ORAL at 04:12

## 2021-12-05 RX ADMIN — Medication 2 CAPSULE: at 12:12

## 2021-12-05 RX ADMIN — HYDROCODONE BITARTRATE AND ACETAMINOPHEN 1 TABLET: 5; 325 TABLET ORAL at 09:12

## 2021-12-05 RX ADMIN — FOLIC ACID 1 MG: 1 TABLET ORAL at 09:12

## 2021-12-05 RX ADMIN — LOSARTAN POTASSIUM 25 MG: 25 TABLET, FILM COATED ORAL at 09:12

## 2021-12-05 RX ADMIN — Medication 2 CAPSULE: at 09:12

## 2021-12-05 RX ADMIN — METOPROLOL TARTRATE 50 MG: 50 TABLET, FILM COATED ORAL at 09:12

## 2021-12-05 RX ADMIN — PIPERACILLIN AND TAZOBACTAM 4.5 G: 4; .5 INJECTION, POWDER, LYOPHILIZED, FOR SOLUTION INTRAVENOUS at 06:12

## 2021-12-05 RX ADMIN — INSULIN ASPART 14 UNITS: 100 INJECTION, SOLUTION INTRAVENOUS; SUBCUTANEOUS at 09:12

## 2021-12-05 RX ADMIN — PANTOPRAZOLE SODIUM 40 MG: 40 TABLET, DELAYED RELEASE ORAL at 09:12

## 2021-12-05 RX ADMIN — SODIUM BICARBONATE 650 MG: 650 TABLET ORAL at 03:12

## 2021-12-05 RX ADMIN — INSULIN DETEMIR 15 UNITS: 100 INJECTION, SOLUTION SUBCUTANEOUS at 09:12

## 2021-12-05 RX ADMIN — ENOXAPARIN SODIUM 40 MG: 40 INJECTION SUBCUTANEOUS at 04:12

## 2021-12-06 PROBLEM — L02.416 ABSCESS OF LEG, LEFT: Status: ACTIVE | Noted: 2021-12-06

## 2021-12-06 PROBLEM — L97.509 TOE ULCER: Status: ACTIVE | Noted: 2021-12-06

## 2021-12-06 LAB
ALBUMIN SERPL BCP-MCNC: 1.5 G/DL (ref 3.5–5)
ALBUMIN/GLOB SERPL: 0.2 {RATIO}
ALP SERPL-CCNC: 193 U/L (ref 45–115)
ALT SERPL W P-5'-P-CCNC: 20 U/L (ref 16–61)
ANION GAP SERPL CALCULATED.3IONS-SCNC: 20 MMOL/L (ref 7–16)
AST SERPL W P-5'-P-CCNC: 25 U/L (ref 15–37)
BASOPHILS # BLD AUTO: 0.02 K/UL (ref 0–0.2)
BASOPHILS NFR BLD AUTO: 0.2 % (ref 0–1)
BILIRUB SERPL-MCNC: 0.3 MG/DL (ref 0–1.2)
BUN SERPL-MCNC: 41 MG/DL (ref 7–18)
BUN/CREAT SERPL: 12 (ref 6–20)
CALCIUM SERPL-MCNC: 8.8 MG/DL (ref 8.5–10.1)
CHLORIDE SERPL-SCNC: 110 MMOL/L (ref 98–107)
CO2 SERPL-SCNC: 17 MMOL/L (ref 21–32)
CREAT SERPL-MCNC: 3.47 MG/DL (ref 0.7–1.3)
DIFFERENTIAL METHOD BLD: ABNORMAL
EOSINOPHIL # BLD AUTO: 0.24 K/UL (ref 0–0.5)
EOSINOPHIL NFR BLD AUTO: 2.6 % (ref 1–4)
ERYTHROCYTE [DISTWIDTH] IN BLOOD BY AUTOMATED COUNT: 14.4 % (ref 11.5–14.5)
GLOBULIN SER-MCNC: 7.2 G/DL (ref 2–4)
GLUCOSE SERPL-MCNC: 233 MG/DL (ref 70–105)
GLUCOSE SERPL-MCNC: 238 MG/DL (ref 74–106)
GLUCOSE SERPL-MCNC: 260 MG/DL (ref 70–105)
GLUCOSE SERPL-MCNC: 87 MG/DL (ref 70–105)
GLUCOSE SERPL-MCNC: 97 MG/DL (ref 70–105)
HCT VFR BLD AUTO: 25.9 % (ref 40–54)
HGB BLD-MCNC: 7.6 G/DL (ref 13.5–18)
IMM GRANULOCYTES # BLD AUTO: 0.07 K/UL (ref 0–0.04)
IMM GRANULOCYTES NFR BLD: 0.8 % (ref 0–0.4)
LYMPHOCYTES # BLD AUTO: 1.47 K/UL (ref 1–4.8)
LYMPHOCYTES NFR BLD AUTO: 15.8 % (ref 27–41)
MCH RBC QN AUTO: 29.5 PG (ref 27–31)
MCHC RBC AUTO-ENTMCNC: 29.3 G/DL (ref 32–36)
MCV RBC AUTO: 100.4 FL (ref 80–96)
MONOCYTES # BLD AUTO: 0.66 K/UL (ref 0–0.8)
MONOCYTES NFR BLD AUTO: 7.1 % (ref 2–6)
MPC BLD CALC-MCNC: 10.1 FL (ref 9.4–12.4)
NEUTROPHILS # BLD AUTO: 6.85 K/UL (ref 1.8–7.7)
NEUTROPHILS NFR BLD AUTO: 73.5 % (ref 53–65)
NRBC # BLD AUTO: 0 X10E3/UL
NRBC, AUTO (.00): 0 %
PLATELET # BLD AUTO: 413 K/UL (ref 150–400)
POTASSIUM SERPL-SCNC: 4 MMOL/L (ref 3.5–5.1)
PROT SERPL-MCNC: 8.7 G/DL (ref 6.4–8.2)
RBC # BLD AUTO: 2.58 M/UL (ref 4.6–6.2)
SODIUM SERPL-SCNC: 143 MMOL/L (ref 136–145)
WBC # BLD AUTO: 9.31 K/UL (ref 4.5–11)

## 2021-12-06 PROCEDURE — 99233 PR SUBSEQUENT HOSPITAL CARE,LEVL III: ICD-10-PCS | Mod: ,,, | Performed by: INTERNAL MEDICINE

## 2021-12-06 PROCEDURE — 63600175 PHARM REV CODE 636 W HCPCS: Performed by: INTERNAL MEDICINE

## 2021-12-06 PROCEDURE — 99900035 HC TECH TIME PER 15 MIN (STAT)

## 2021-12-06 PROCEDURE — 99232 SBSQ HOSP IP/OBS MODERATE 35: CPT | Mod: ,,, | Performed by: INTERNAL MEDICINE

## 2021-12-06 PROCEDURE — A6250 SKIN SEAL PROTECT MOISTURIZR: HCPCS

## 2021-12-06 PROCEDURE — 25000003 PHARM REV CODE 250: Performed by: SURGERY

## 2021-12-06 PROCEDURE — 94761 N-INVAS EAR/PLS OXIMETRY MLT: CPT

## 2021-12-06 PROCEDURE — 25000003 PHARM REV CODE 250: Performed by: STUDENT IN AN ORGANIZED HEALTH CARE EDUCATION/TRAINING PROGRAM

## 2021-12-06 PROCEDURE — 85025 COMPLETE CBC W/AUTO DIFF WBC: CPT | Performed by: INTERNAL MEDICINE

## 2021-12-06 PROCEDURE — 96372 THER/PROPH/DIAG INJ SC/IM: CPT

## 2021-12-06 PROCEDURE — 63700000 PHARM REV CODE 250 ALT 637 W/O HCPCS: Performed by: INTERNAL MEDICINE

## 2021-12-06 PROCEDURE — 25000003 PHARM REV CODE 250: Performed by: NURSE PRACTITIONER

## 2021-12-06 PROCEDURE — 27000620

## 2021-12-06 PROCEDURE — 11000001 HC ACUTE MED/SURG PRIVATE ROOM

## 2021-12-06 PROCEDURE — 99233 SBSQ HOSP IP/OBS HIGH 50: CPT | Mod: ,,, | Performed by: INTERNAL MEDICINE

## 2021-12-06 PROCEDURE — 80053 COMPREHEN METABOLIC PANEL: CPT | Performed by: INTERNAL MEDICINE

## 2021-12-06 PROCEDURE — 27201914 HC 4-LAYER WRAP

## 2021-12-06 PROCEDURE — 63600175 PHARM REV CODE 636 W HCPCS: Performed by: SURGERY

## 2021-12-06 PROCEDURE — 36415 COLL VENOUS BLD VENIPUNCTURE: CPT | Performed by: INTERNAL MEDICINE

## 2021-12-06 PROCEDURE — 82962 GLUCOSE BLOOD TEST: CPT

## 2021-12-06 PROCEDURE — 99232 PR SUBSEQUENT HOSPITAL CARE,LEVL II: ICD-10-PCS | Mod: ,,, | Performed by: INTERNAL MEDICINE

## 2021-12-06 PROCEDURE — 25000003 PHARM REV CODE 250: Performed by: INTERNAL MEDICINE

## 2021-12-06 RX ORDER — FLUCONAZOLE 100 MG/1
100 TABLET ORAL DAILY
Status: DISCONTINUED | OUTPATIENT
Start: 2021-12-06 | End: 2021-12-10 | Stop reason: HOSPADM

## 2021-12-06 RX ADMIN — AMLODIPINE BESYLATE 10 MG: 10 TABLET ORAL at 08:12

## 2021-12-06 RX ADMIN — SODIUM BICARBONATE 650 MG: 650 TABLET ORAL at 08:12

## 2021-12-06 RX ADMIN — MORPHINE SULFATE 6 MG: 2 INJECTION, SOLUTION INTRAMUSCULAR; INTRAVENOUS at 06:12

## 2021-12-06 RX ADMIN — PIPERACILLIN AND TAZOBACTAM 4.5 G: 4; .5 INJECTION, POWDER, LYOPHILIZED, FOR SOLUTION INTRAVENOUS at 11:12

## 2021-12-06 RX ADMIN — ENOXAPARIN SODIUM 40 MG: 40 INJECTION SUBCUTANEOUS at 05:12

## 2021-12-06 RX ADMIN — PIPERACILLIN AND TAZOBACTAM 4.5 G: 4; .5 INJECTION, POWDER, LYOPHILIZED, FOR SOLUTION INTRAVENOUS at 09:12

## 2021-12-06 RX ADMIN — Medication 2 CAPSULE: at 11:12

## 2021-12-06 RX ADMIN — PANTOPRAZOLE SODIUM 40 MG: 40 TABLET, DELAYED RELEASE ORAL at 08:12

## 2021-12-06 RX ADMIN — FLUCONAZOLE 100 MG: 100 TABLET ORAL at 05:12

## 2021-12-06 RX ADMIN — Medication 2 CAPSULE: at 07:12

## 2021-12-06 RX ADMIN — FOLIC ACID 1 MG: 1 TABLET ORAL at 08:12

## 2021-12-06 RX ADMIN — METOPROLOL TARTRATE 50 MG: 50 TABLET, FILM COATED ORAL at 08:12

## 2021-12-06 RX ADMIN — THERA TABS 1 TABLET: TAB at 08:12

## 2021-12-06 RX ADMIN — METOPROLOL TARTRATE 50 MG: 50 TABLET, FILM COATED ORAL at 09:12

## 2021-12-06 RX ADMIN — HYDROCODONE BITARTRATE AND ACETAMINOPHEN 1 TABLET: 5; 325 TABLET ORAL at 05:12

## 2021-12-06 RX ADMIN — INSULIN ASPART 14 UNITS: 100 INJECTION, SOLUTION INTRAVENOUS; SUBCUTANEOUS at 05:12

## 2021-12-06 RX ADMIN — SODIUM BICARBONATE 650 MG: 650 TABLET ORAL at 09:12

## 2021-12-06 RX ADMIN — INSULIN ASPART 17 UNITS: 100 INJECTION, SOLUTION INTRAVENOUS; SUBCUTANEOUS at 07:12

## 2021-12-06 RX ADMIN — SODIUM BICARBONATE 650 MG: 650 TABLET ORAL at 01:12

## 2021-12-06 RX ADMIN — LOSARTAN POTASSIUM 25 MG: 25 TABLET, FILM COATED ORAL at 08:12

## 2021-12-06 RX ADMIN — Medication 2 CAPSULE: at 05:12

## 2021-12-06 RX ADMIN — PIPERACILLIN AND TAZOBACTAM 4.5 G: 4; .5 INJECTION, POWDER, LYOPHILIZED, FOR SOLUTION INTRAVENOUS at 04:12

## 2021-12-07 LAB
ALBUMIN SERPL BCP-MCNC: 1.4 G/DL (ref 3.5–5)
ALBUMIN/GLOB SERPL: 0.2 {RATIO}
ALP SERPL-CCNC: 170 U/L (ref 45–115)
ALT SERPL W P-5'-P-CCNC: 22 U/L (ref 16–61)
ANION GAP SERPL CALCULATED.3IONS-SCNC: 18 MMOL/L (ref 7–16)
AST SERPL W P-5'-P-CCNC: 34 U/L (ref 15–37)
BASOPHILS # BLD AUTO: 0.03 K/UL (ref 0–0.2)
BASOPHILS NFR BLD AUTO: 0.4 % (ref 0–1)
BILIRUB SERPL-MCNC: 0.3 MG/DL (ref 0–1.2)
BUN SERPL-MCNC: 38 MG/DL (ref 7–18)
BUN/CREAT SERPL: 12 (ref 6–20)
CALCIUM SERPL-MCNC: 8.6 MG/DL (ref 8.5–10.1)
CHLORIDE SERPL-SCNC: 111 MMOL/L (ref 98–107)
CO2 SERPL-SCNC: 17 MMOL/L (ref 21–32)
CREAT SERPL-MCNC: 3.2 MG/DL (ref 0.7–1.3)
DIFFERENTIAL METHOD BLD: ABNORMAL
EOSINOPHIL # BLD AUTO: 0.25 K/UL (ref 0–0.5)
EOSINOPHIL NFR BLD AUTO: 3 % (ref 1–4)
ERYTHROCYTE [DISTWIDTH] IN BLOOD BY AUTOMATED COUNT: 14.1 % (ref 11.5–14.5)
GLOBULIN SER-MCNC: 6.3 G/DL (ref 2–4)
GLUCOSE SERPL-MCNC: 116 MG/DL (ref 70–105)
GLUCOSE SERPL-MCNC: 130 MG/DL (ref 70–105)
GLUCOSE SERPL-MCNC: 140 MG/DL (ref 74–106)
GLUCOSE SERPL-MCNC: 153 MG/DL (ref 70–105)
GLUCOSE SERPL-MCNC: 228 MG/DL (ref 70–105)
HCT VFR BLD AUTO: 24.9 % (ref 40–54)
HGB BLD-MCNC: 7.2 G/DL (ref 13.5–18)
IMM GRANULOCYTES # BLD AUTO: 0.05 K/UL (ref 0–0.04)
IMM GRANULOCYTES NFR BLD: 0.6 % (ref 0–0.4)
LYMPHOCYTES # BLD AUTO: 1.2 K/UL (ref 1–4.8)
LYMPHOCYTES NFR BLD AUTO: 14.2 % (ref 27–41)
MCH RBC QN AUTO: 29.5 PG (ref 27–31)
MCHC RBC AUTO-ENTMCNC: 28.9 G/DL (ref 32–36)
MCV RBC AUTO: 102 FL (ref 80–96)
MONOCYTES # BLD AUTO: 0.68 K/UL (ref 0–0.8)
MONOCYTES NFR BLD AUTO: 8 % (ref 2–6)
MPC BLD CALC-MCNC: 10.2 FL (ref 9.4–12.4)
NEUTROPHILS # BLD AUTO: 6.24 K/UL (ref 1.8–7.7)
NEUTROPHILS NFR BLD AUTO: 73.8 % (ref 53–65)
NRBC # BLD AUTO: 0 X10E3/UL
NRBC, AUTO (.00): 0 %
PLATELET # BLD AUTO: 340 K/UL (ref 150–400)
POTASSIUM SERPL-SCNC: 3.7 MMOL/L (ref 3.5–5.1)
PROT SERPL-MCNC: 7.7 G/DL (ref 6.4–8.2)
RBC # BLD AUTO: 2.44 M/UL (ref 4.6–6.2)
SODIUM SERPL-SCNC: 142 MMOL/L (ref 136–145)
WBC # BLD AUTO: 8.45 K/UL (ref 4.5–11)

## 2021-12-07 PROCEDURE — 25000003 PHARM REV CODE 250: Performed by: SURGERY

## 2021-12-07 PROCEDURE — 11000001 HC ACUTE MED/SURG PRIVATE ROOM

## 2021-12-07 PROCEDURE — 63700000 PHARM REV CODE 250 ALT 637 W/O HCPCS: Performed by: INTERNAL MEDICINE

## 2021-12-07 PROCEDURE — 94761 N-INVAS EAR/PLS OXIMETRY MLT: CPT

## 2021-12-07 PROCEDURE — 25000003 PHARM REV CODE 250: Performed by: NURSE PRACTITIONER

## 2021-12-07 PROCEDURE — 80053 COMPREHEN METABOLIC PANEL: CPT | Performed by: INTERNAL MEDICINE

## 2021-12-07 PROCEDURE — 25000003 PHARM REV CODE 250: Performed by: STUDENT IN AN ORGANIZED HEALTH CARE EDUCATION/TRAINING PROGRAM

## 2021-12-07 PROCEDURE — 63600175 PHARM REV CODE 636 W HCPCS: Performed by: INTERNAL MEDICINE

## 2021-12-07 PROCEDURE — 36415 COLL VENOUS BLD VENIPUNCTURE: CPT | Performed by: INTERNAL MEDICINE

## 2021-12-07 PROCEDURE — C9399 UNCLASSIFIED DRUGS OR BIOLOG: HCPCS | Performed by: INTERNAL MEDICINE

## 2021-12-07 PROCEDURE — 82962 GLUCOSE BLOOD TEST: CPT

## 2021-12-07 PROCEDURE — 25000003 PHARM REV CODE 250: Performed by: INTERNAL MEDICINE

## 2021-12-07 PROCEDURE — 97110 THERAPEUTIC EXERCISES: CPT

## 2021-12-07 PROCEDURE — 85025 COMPLETE CBC W/AUTO DIFF WBC: CPT | Performed by: INTERNAL MEDICINE

## 2021-12-07 PROCEDURE — 99232 SBSQ HOSP IP/OBS MODERATE 35: CPT | Mod: ,,, | Performed by: INTERNAL MEDICINE

## 2021-12-07 PROCEDURE — 99232 PR SUBSEQUENT HOSPITAL CARE,LEVL II: ICD-10-PCS | Mod: ,,, | Performed by: INTERNAL MEDICINE

## 2021-12-07 PROCEDURE — 99900035 HC TECH TIME PER 15 MIN (STAT)

## 2021-12-07 RX ADMIN — HYDROCODONE BITARTRATE AND ACETAMINOPHEN 1 TABLET: 5; 325 TABLET ORAL at 03:12

## 2021-12-07 RX ADMIN — SODIUM BICARBONATE 650 MG: 650 TABLET ORAL at 09:12

## 2021-12-07 RX ADMIN — INSULIN DETEMIR 15 UNITS: 100 INJECTION, SOLUTION SUBCUTANEOUS at 09:12

## 2021-12-07 RX ADMIN — FOLIC ACID 1 MG: 1 TABLET ORAL at 10:12

## 2021-12-07 RX ADMIN — SODIUM BICARBONATE 650 MG: 650 TABLET ORAL at 10:12

## 2021-12-07 RX ADMIN — HYDROCODONE BITARTRATE AND ACETAMINOPHEN 1 TABLET: 5; 325 TABLET ORAL at 09:12

## 2021-12-07 RX ADMIN — HYDROCODONE BITARTRATE AND ACETAMINOPHEN 1 TABLET: 5; 325 TABLET ORAL at 05:12

## 2021-12-07 RX ADMIN — ENOXAPARIN SODIUM 40 MG: 40 INJECTION SUBCUTANEOUS at 03:12

## 2021-12-07 RX ADMIN — THERA TABS 1 TABLET: TAB at 10:12

## 2021-12-07 RX ADMIN — INSULIN ASPART 14 UNITS: 100 INJECTION, SOLUTION INTRAVENOUS; SUBCUTANEOUS at 04:12

## 2021-12-07 RX ADMIN — METOPROLOL TARTRATE 50 MG: 50 TABLET, FILM COATED ORAL at 09:12

## 2021-12-07 RX ADMIN — FLUCONAZOLE 100 MG: 100 TABLET ORAL at 10:12

## 2021-12-07 RX ADMIN — Medication 2 CAPSULE: at 03:12

## 2021-12-07 RX ADMIN — PIPERACILLIN AND TAZOBACTAM 4.5 G: 4; .5 INJECTION, POWDER, LYOPHILIZED, FOR SOLUTION INTRAVENOUS at 03:12

## 2021-12-07 RX ADMIN — LOSARTAN POTASSIUM 25 MG: 25 TABLET, FILM COATED ORAL at 10:12

## 2021-12-07 RX ADMIN — COLLAGENASE SANTYL: 250 OINTMENT TOPICAL at 10:12

## 2021-12-07 RX ADMIN — AMLODIPINE BESYLATE 10 MG: 10 TABLET ORAL at 10:12

## 2021-12-07 RX ADMIN — PIPERACILLIN AND TAZOBACTAM 4.5 G: 4; .5 INJECTION, POWDER, LYOPHILIZED, FOR SOLUTION INTRAVENOUS at 09:12

## 2021-12-07 RX ADMIN — Medication 2 CAPSULE: at 11:12

## 2021-12-07 RX ADMIN — Medication 2 CAPSULE: at 10:12

## 2021-12-07 RX ADMIN — METOPROLOL TARTRATE 50 MG: 50 TABLET, FILM COATED ORAL at 10:12

## 2021-12-07 RX ADMIN — SODIUM BICARBONATE 650 MG: 650 TABLET ORAL at 03:12

## 2021-12-07 RX ADMIN — PANTOPRAZOLE SODIUM 40 MG: 40 TABLET, DELAYED RELEASE ORAL at 10:12

## 2021-12-07 RX ADMIN — PIPERACILLIN AND TAZOBACTAM 4.5 G: 4; .5 INJECTION, POWDER, LYOPHILIZED, FOR SOLUTION INTRAVENOUS at 11:12

## 2021-12-07 RX ADMIN — INSULIN ASPART 10 UNITS: 100 INJECTION, SOLUTION INTRAVENOUS; SUBCUTANEOUS at 06:12

## 2021-12-08 LAB
ALBUMIN SERPL BCP-MCNC: 1.4 G/DL (ref 3.5–5)
ALBUMIN/GLOB SERPL: 0.2 {RATIO}
ALP SERPL-CCNC: 159 U/L (ref 45–115)
ALT SERPL W P-5'-P-CCNC: 26 U/L (ref 16–61)
ANION GAP SERPL CALCULATED.3IONS-SCNC: 15 MMOL/L (ref 7–16)
AST SERPL W P-5'-P-CCNC: 35 U/L (ref 15–37)
BASOPHILS # BLD AUTO: 0.05 K/UL (ref 0–0.2)
BASOPHILS NFR BLD AUTO: 0.5 % (ref 0–1)
BILIRUB SERPL-MCNC: 0.3 MG/DL (ref 0–1.2)
BUN SERPL-MCNC: 31 MG/DL (ref 7–18)
BUN/CREAT SERPL: 11 (ref 6–20)
CALCIUM SERPL-MCNC: 8.5 MG/DL (ref 8.5–10.1)
CHLORIDE SERPL-SCNC: 113 MMOL/L (ref 98–107)
CO2 SERPL-SCNC: 19 MMOL/L (ref 21–32)
CREAT SERPL-MCNC: 2.83 MG/DL (ref 0.7–1.3)
DIFFERENTIAL METHOD BLD: ABNORMAL
EOSINOPHIL # BLD AUTO: 0.38 K/UL (ref 0–0.5)
EOSINOPHIL NFR BLD AUTO: 4.1 % (ref 1–4)
ERYTHROCYTE [DISTWIDTH] IN BLOOD BY AUTOMATED COUNT: 14.2 % (ref 11.5–14.5)
GLOBULIN SER-MCNC: 6.4 G/DL (ref 2–4)
GLUCOSE SERPL-MCNC: 120 MG/DL (ref 70–105)
GLUCOSE SERPL-MCNC: 191 MG/DL (ref 70–105)
GLUCOSE SERPL-MCNC: 210 MG/DL (ref 70–105)
GLUCOSE SERPL-MCNC: 231 MG/DL (ref 70–105)
GLUCOSE SERPL-MCNC: 73 MG/DL (ref 74–106)
HCT VFR BLD AUTO: 22.6 % (ref 40–54)
HGB BLD-MCNC: 7.1 G/DL (ref 13.5–18)
IMM GRANULOCYTES # BLD AUTO: 0.06 K/UL (ref 0–0.04)
IMM GRANULOCYTES NFR BLD: 0.6 % (ref 0–0.4)
INDIRECT COOMBS: NORMAL
LYMPHOCYTES # BLD AUTO: 1.34 K/UL (ref 1–4.8)
LYMPHOCYTES NFR BLD AUTO: 14.3 % (ref 27–41)
MCH RBC QN AUTO: 29.3 PG (ref 27–31)
MCHC RBC AUTO-ENTMCNC: 31.4 G/DL (ref 32–36)
MCV RBC AUTO: 93.4 FL (ref 80–96)
MONOCYTES # BLD AUTO: 0.68 K/UL (ref 0–0.8)
MONOCYTES NFR BLD AUTO: 7.3 % (ref 2–6)
MPC BLD CALC-MCNC: 10.7 FL (ref 9.4–12.4)
NEUTROPHILS # BLD AUTO: 6.86 K/UL (ref 1.8–7.7)
NEUTROPHILS NFR BLD AUTO: 73.2 % (ref 53–65)
NRBC # BLD AUTO: 0 X10E3/UL
NRBC, AUTO (.00): 0 %
PLATELET # BLD AUTO: 403 K/UL (ref 150–400)
POTASSIUM SERPL-SCNC: 3.7 MMOL/L (ref 3.5–5.1)
PROT SERPL-MCNC: 7.8 G/DL (ref 6.4–8.2)
RBC # BLD AUTO: 2.42 M/UL (ref 4.6–6.2)
RH BLD: NORMAL
SODIUM SERPL-SCNC: 143 MMOL/L (ref 136–145)
WBC # BLD AUTO: 9.37 K/UL (ref 4.5–11)

## 2021-12-08 PROCEDURE — 25000003 PHARM REV CODE 250: Performed by: SURGERY

## 2021-12-08 PROCEDURE — 63700000 PHARM REV CODE 250 ALT 637 W/O HCPCS: Performed by: INTERNAL MEDICINE

## 2021-12-08 PROCEDURE — 85025 COMPLETE CBC W/AUTO DIFF WBC: CPT | Performed by: INTERNAL MEDICINE

## 2021-12-08 PROCEDURE — 99232 PR SUBSEQUENT HOSPITAL CARE,LEVL II: ICD-10-PCS | Mod: ,,, | Performed by: INTERNAL MEDICINE

## 2021-12-08 PROCEDURE — 25000003 PHARM REV CODE 250: Performed by: STUDENT IN AN ORGANIZED HEALTH CARE EDUCATION/TRAINING PROGRAM

## 2021-12-08 PROCEDURE — 25000003 PHARM REV CODE 250: Performed by: INTERNAL MEDICINE

## 2021-12-08 PROCEDURE — 99232 SBSQ HOSP IP/OBS MODERATE 35: CPT | Mod: ,,, | Performed by: INTERNAL MEDICINE

## 2021-12-08 PROCEDURE — 86923 COMPATIBILITY TEST ELECTRIC: CPT | Performed by: INTERNAL MEDICINE

## 2021-12-08 PROCEDURE — 11000001 HC ACUTE MED/SURG PRIVATE ROOM

## 2021-12-08 PROCEDURE — 86900 BLOOD TYPING SEROLOGIC ABO: CPT | Performed by: INTERNAL MEDICINE

## 2021-12-08 PROCEDURE — C9399 UNCLASSIFIED DRUGS OR BIOLOG: HCPCS | Performed by: INTERNAL MEDICINE

## 2021-12-08 PROCEDURE — 86923 COMPATIBILITY TEST ELECTRIC: CPT | Mod: 91 | Performed by: INTERNAL MEDICINE

## 2021-12-08 PROCEDURE — 63600175 PHARM REV CODE 636 W HCPCS: Performed by: INTERNAL MEDICINE

## 2021-12-08 PROCEDURE — 99900035 HC TECH TIME PER 15 MIN (STAT)

## 2021-12-08 PROCEDURE — 82962 GLUCOSE BLOOD TEST: CPT

## 2021-12-08 PROCEDURE — 80053 COMPREHEN METABOLIC PANEL: CPT | Performed by: INTERNAL MEDICINE

## 2021-12-08 PROCEDURE — 25000003 PHARM REV CODE 250: Performed by: NURSE PRACTITIONER

## 2021-12-08 PROCEDURE — 36415 COLL VENOUS BLD VENIPUNCTURE: CPT | Performed by: INTERNAL MEDICINE

## 2021-12-08 RX ORDER — HYDROCODONE BITARTRATE AND ACETAMINOPHEN 500; 5 MG/1; MG/1
TABLET ORAL
Status: DISCONTINUED | OUTPATIENT
Start: 2021-12-08 | End: 2021-12-10 | Stop reason: SDUPTHER

## 2021-12-08 RX ADMIN — FLUCONAZOLE 100 MG: 100 TABLET ORAL at 09:12

## 2021-12-08 RX ADMIN — HYDROCODONE BITARTRATE AND ACETAMINOPHEN 1 TABLET: 5; 325 TABLET ORAL at 09:12

## 2021-12-08 RX ADMIN — LOSARTAN POTASSIUM 25 MG: 25 TABLET, FILM COATED ORAL at 09:12

## 2021-12-08 RX ADMIN — SODIUM BICARBONATE 650 MG: 650 TABLET ORAL at 09:12

## 2021-12-08 RX ADMIN — INSULIN ASPART 14 UNITS: 100 INJECTION, SOLUTION INTRAVENOUS; SUBCUTANEOUS at 04:12

## 2021-12-08 RX ADMIN — INSULIN ASPART 14 UNITS: 100 INJECTION, SOLUTION INTRAVENOUS; SUBCUTANEOUS at 06:12

## 2021-12-08 RX ADMIN — FOLIC ACID 1 MG: 1 TABLET ORAL at 09:12

## 2021-12-08 RX ADMIN — TRAZODONE HYDROCHLORIDE 50 MG: 50 TABLET ORAL at 09:12

## 2021-12-08 RX ADMIN — PIPERACILLIN AND TAZOBACTAM 4.5 G: 4; .5 INJECTION, POWDER, LYOPHILIZED, FOR SOLUTION INTRAVENOUS at 06:12

## 2021-12-08 RX ADMIN — Medication 2 CAPSULE: at 04:12

## 2021-12-08 RX ADMIN — ENOXAPARIN SODIUM 40 MG: 40 INJECTION SUBCUTANEOUS at 04:12

## 2021-12-08 RX ADMIN — COLLAGENASE SANTYL: 250 OINTMENT TOPICAL at 09:12

## 2021-12-08 RX ADMIN — THERA TABS 1 TABLET: TAB at 09:12

## 2021-12-08 RX ADMIN — Medication 2 CAPSULE: at 09:12

## 2021-12-08 RX ADMIN — PANTOPRAZOLE SODIUM 40 MG: 40 TABLET, DELAYED RELEASE ORAL at 09:12

## 2021-12-08 RX ADMIN — PIPERACILLIN AND TAZOBACTAM 4.5 G: 4; .5 INJECTION, POWDER, LYOPHILIZED, FOR SOLUTION INTRAVENOUS at 03:12

## 2021-12-08 RX ADMIN — METOPROLOL TARTRATE 50 MG: 50 TABLET, FILM COATED ORAL at 09:12

## 2021-12-08 RX ADMIN — SODIUM BICARBONATE 650 MG: 650 TABLET ORAL at 04:12

## 2021-12-08 RX ADMIN — INSULIN DETEMIR 15 UNITS: 100 INJECTION, SOLUTION SUBCUTANEOUS at 09:12

## 2021-12-09 PROBLEM — E87.20 METABOLIC ACIDOSIS: Status: RESOLVED | Noted: 2021-11-16 | Resolved: 2021-12-09

## 2021-12-09 LAB
ALBUMIN SERPL BCP-MCNC: 1.5 G/DL (ref 3.5–5)
ALBUMIN/GLOB SERPL: 0.2 {RATIO}
ALP SERPL-CCNC: 164 U/L (ref 45–115)
ALT SERPL W P-5'-P-CCNC: 29 U/L (ref 16–61)
ANION GAP SERPL CALCULATED.3IONS-SCNC: 15 MMOL/L (ref 7–16)
AST SERPL W P-5'-P-CCNC: 28 U/L (ref 15–37)
BASOPHILS # BLD AUTO: 0.03 K/UL (ref 0–0.2)
BASOPHILS NFR BLD AUTO: 0.4 % (ref 0–1)
BILIRUB SERPL-MCNC: 0.3 MG/DL (ref 0–1.2)
BUN SERPL-MCNC: 29 MG/DL (ref 7–18)
BUN/CREAT SERPL: 12 (ref 6–20)
CALCIUM SERPL-MCNC: 8.8 MG/DL (ref 8.5–10.1)
CHLORIDE SERPL-SCNC: 112 MMOL/L (ref 98–107)
CO2 SERPL-SCNC: 20 MMOL/L (ref 21–32)
CREAT SERPL-MCNC: 2.49 MG/DL (ref 0.7–1.3)
DIFFERENTIAL METHOD BLD: ABNORMAL
EOSINOPHIL # BLD AUTO: 0.38 K/UL (ref 0–0.5)
EOSINOPHIL NFR BLD AUTO: 4.6 % (ref 1–4)
ERYTHROCYTE [DISTWIDTH] IN BLOOD BY AUTOMATED COUNT: 14.3 % (ref 11.5–14.5)
GLOBULIN SER-MCNC: 6.3 G/DL (ref 2–4)
GLUCOSE SERPL-MCNC: 109 MG/DL (ref 74–106)
GLUCOSE SERPL-MCNC: 114 MG/DL (ref 70–105)
GLUCOSE SERPL-MCNC: 169 MG/DL (ref 70–105)
GLUCOSE SERPL-MCNC: 193 MG/DL (ref 70–105)
GLUCOSE SERPL-MCNC: 279 MG/DL (ref 70–105)
HCT VFR BLD AUTO: 25.8 % (ref 40–54)
HGB BLD-MCNC: 7.7 G/DL (ref 13.5–18)
IMM GRANULOCYTES # BLD AUTO: 0.05 K/UL (ref 0–0.04)
IMM GRANULOCYTES NFR BLD: 0.6 % (ref 0–0.4)
LYMPHOCYTES # BLD AUTO: 1.41 K/UL (ref 1–4.8)
LYMPHOCYTES NFR BLD AUTO: 17.1 % (ref 27–41)
MCH RBC QN AUTO: 29.2 PG (ref 27–31)
MCHC RBC AUTO-ENTMCNC: 29.8 G/DL (ref 32–36)
MCV RBC AUTO: 97.7 FL (ref 80–96)
MONOCYTES # BLD AUTO: 0.62 K/UL (ref 0–0.8)
MONOCYTES NFR BLD AUTO: 7.5 % (ref 2–6)
MPC BLD CALC-MCNC: 10.2 FL (ref 9.4–12.4)
NEUTROPHILS # BLD AUTO: 5.74 K/UL (ref 1.8–7.7)
NEUTROPHILS NFR BLD AUTO: 69.8 % (ref 53–65)
NRBC # BLD AUTO: 0 X10E3/UL
NRBC, AUTO (.00): 0 %
PLATELET # BLD AUTO: 399 K/UL (ref 150–400)
POTASSIUM SERPL-SCNC: 3.3 MMOL/L (ref 3.5–5.1)
PROT SERPL-MCNC: 7.8 G/DL (ref 6.4–8.2)
RBC # BLD AUTO: 2.64 M/UL (ref 4.6–6.2)
SODIUM SERPL-SCNC: 144 MMOL/L (ref 136–145)
WBC # BLD AUTO: 8.23 K/UL (ref 4.5–11)

## 2021-12-09 PROCEDURE — 82962 GLUCOSE BLOOD TEST: CPT

## 2021-12-09 PROCEDURE — 99239 HOSP IP/OBS DSCHRG MGMT >30: CPT | Mod: ,,, | Performed by: INTERNAL MEDICINE

## 2021-12-09 PROCEDURE — 25000003 PHARM REV CODE 250: Performed by: STUDENT IN AN ORGANIZED HEALTH CARE EDUCATION/TRAINING PROGRAM

## 2021-12-09 PROCEDURE — 63700000 PHARM REV CODE 250 ALT 637 W/O HCPCS: Performed by: INTERNAL MEDICINE

## 2021-12-09 PROCEDURE — 25000003 PHARM REV CODE 250: Performed by: NURSE PRACTITIONER

## 2021-12-09 PROCEDURE — 99233 PR SUBSEQUENT HOSPITAL CARE,LEVL III: ICD-10-PCS | Mod: ,,, | Performed by: INTERNAL MEDICINE

## 2021-12-09 PROCEDURE — 97110 THERAPEUTIC EXERCISES: CPT

## 2021-12-09 PROCEDURE — 80053 COMPREHEN METABOLIC PANEL: CPT | Performed by: INTERNAL MEDICINE

## 2021-12-09 PROCEDURE — 63600175 PHARM REV CODE 636 W HCPCS: Performed by: INTERNAL MEDICINE

## 2021-12-09 PROCEDURE — 99239 PR HOSPITAL DISCHARGE DAY,>30 MIN: ICD-10-PCS | Mod: ,,, | Performed by: INTERNAL MEDICINE

## 2021-12-09 PROCEDURE — 25000003 PHARM REV CODE 250: Performed by: INTERNAL MEDICINE

## 2021-12-09 PROCEDURE — 25000003 PHARM REV CODE 250: Performed by: SURGERY

## 2021-12-09 PROCEDURE — 85025 COMPLETE CBC W/AUTO DIFF WBC: CPT | Performed by: INTERNAL MEDICINE

## 2021-12-09 PROCEDURE — 99233 SBSQ HOSP IP/OBS HIGH 50: CPT | Mod: ,,, | Performed by: INTERNAL MEDICINE

## 2021-12-09 PROCEDURE — 36415 COLL VENOUS BLD VENIPUNCTURE: CPT | Performed by: INTERNAL MEDICINE

## 2021-12-09 PROCEDURE — C9399 UNCLASSIFIED DRUGS OR BIOLOG: HCPCS | Performed by: INTERNAL MEDICINE

## 2021-12-09 PROCEDURE — 11000001 HC ACUTE MED/SURG PRIVATE ROOM

## 2021-12-09 PROCEDURE — 94761 N-INVAS EAR/PLS OXIMETRY MLT: CPT

## 2021-12-09 RX ORDER — AMLODIPINE BESYLATE 10 MG/1
10 TABLET ORAL DAILY
Qty: 14 TABLET | Refills: 0 | Status: SHIPPED | OUTPATIENT
Start: 2021-12-10 | End: 2021-12-24

## 2021-12-09 RX ORDER — POTASSIUM CHLORIDE 750 MG/1
10 TABLET, EXTENDED RELEASE ORAL DAILY
Status: DISCONTINUED | OUTPATIENT
Start: 2021-12-10 | End: 2021-12-10 | Stop reason: HOSPADM

## 2021-12-09 RX ORDER — POTASSIUM CHLORIDE 750 MG/1
30 TABLET, EXTENDED RELEASE ORAL ONCE
Status: COMPLETED | OUTPATIENT
Start: 2021-12-09 | End: 2021-12-09

## 2021-12-09 RX ORDER — FERROUS SULFATE 325(65) MG
325 TABLET, DELAYED RELEASE (ENTERIC COATED) ORAL
Status: ON HOLD | COMMUNITY
End: 2022-07-28 | Stop reason: CLARIF

## 2021-12-09 RX ORDER — GABAPENTIN 300 MG/1
300 CAPSULE ORAL 2 TIMES DAILY
COMMUNITY

## 2021-12-09 RX ORDER — METOPROLOL TARTRATE 50 MG/1
50 TABLET ORAL 2 TIMES DAILY
Qty: 28 TABLET | Refills: 0 | Status: ON HOLD | OUTPATIENT
Start: 2021-12-09 | End: 2022-07-28 | Stop reason: CLARIF

## 2021-12-09 RX ORDER — HYDROCODONE BITARTRATE AND ACETAMINOPHEN 5; 325 MG/1; MG/1
1 TABLET ORAL EVERY 8 HOURS PRN
Qty: 9 TABLET | Refills: 0 | Status: ON HOLD | OUTPATIENT
Start: 2021-12-09 | End: 2022-07-28 | Stop reason: CLARIF

## 2021-12-09 RX ORDER — SODIUM BICARBONATE 650 MG/1
650 TABLET ORAL 3 TIMES DAILY
Qty: 21 TABLET | Refills: 0 | Status: ON HOLD | OUTPATIENT
Start: 2021-12-09 | End: 2022-07-28 | Stop reason: CLARIF

## 2021-12-09 RX ORDER — DULOXETIN HYDROCHLORIDE 30 MG/1
30 CAPSULE, DELAYED RELEASE ORAL 2 TIMES DAILY
Status: ON HOLD | COMMUNITY
End: 2021-12-09 | Stop reason: HOSPADM

## 2021-12-09 RX ORDER — ERGOCALCIFEROL 1.25 MG/1
50000 CAPSULE ORAL
Status: ON HOLD | COMMUNITY
End: 2022-07-28 | Stop reason: CLARIF

## 2021-12-09 RX ORDER — AMOXICILLIN AND CLAVULANATE POTASSIUM 875; 125 MG/1; MG/1
1 TABLET, FILM COATED ORAL EVERY 12 HOURS
Qty: 28 TABLET | Refills: 0 | Status: SHIPPED | OUTPATIENT
Start: 2021-12-09 | End: 2021-12-23

## 2021-12-09 RX ORDER — FLUCONAZOLE 100 MG/1
100 TABLET ORAL DAILY
Qty: 7 TABLET | Refills: 0 | Status: SHIPPED | OUTPATIENT
Start: 2021-12-10 | End: 2021-12-17

## 2021-12-09 RX ORDER — ASPIRIN 81 MG/1
81 TABLET ORAL DAILY
Status: ON HOLD | COMMUNITY
End: 2022-07-28 | Stop reason: CLARIF

## 2021-12-09 RX ORDER — LOSARTAN POTASSIUM 25 MG/1
25 TABLET ORAL DAILY
Qty: 14 TABLET | Refills: 0 | Status: ON HOLD | OUTPATIENT
Start: 2021-12-10 | End: 2022-07-28 | Stop reason: CLARIF

## 2021-12-09 RX ORDER — AMOXICILLIN AND CLAVULANATE POTASSIUM 875; 125 MG/1; MG/1
1 TABLET, FILM COATED ORAL EVERY 12 HOURS
Status: DISCONTINUED | OUTPATIENT
Start: 2021-12-09 | End: 2021-12-10 | Stop reason: HOSPADM

## 2021-12-09 RX ORDER — POTASSIUM CHLORIDE 750 MG/1
10 TABLET, EXTENDED RELEASE ORAL DAILY
Qty: 7 TABLET | Refills: 0 | Status: SHIPPED | OUTPATIENT
Start: 2021-12-10 | End: 2021-12-17

## 2021-12-09 RX ORDER — FOLIC ACID 1 MG/1
1 TABLET ORAL DAILY
Qty: 30 TABLET | Refills: 0 | Status: ON HOLD | OUTPATIENT
Start: 2021-12-10 | End: 2022-07-28 | Stop reason: CLARIF

## 2021-12-09 RX ORDER — LACTOBACILLUS ACIDOPHILUS 500MM CELL
2 CAPSULE ORAL
Qty: 84 CAPSULE | Refills: 0 | Status: SHIPPED | OUTPATIENT
Start: 2021-12-09 | End: 2021-12-23

## 2021-12-09 RX ADMIN — FLUCONAZOLE 100 MG: 100 TABLET ORAL at 09:12

## 2021-12-09 RX ADMIN — SODIUM BICARBONATE 650 MG: 650 TABLET ORAL at 09:12

## 2021-12-09 RX ADMIN — FOLIC ACID 1 MG: 1 TABLET ORAL at 09:12

## 2021-12-09 RX ADMIN — PIPERACILLIN AND TAZOBACTAM 4.5 G: 4; .5 INJECTION, POWDER, LYOPHILIZED, FOR SOLUTION INTRAVENOUS at 10:12

## 2021-12-09 RX ADMIN — PANTOPRAZOLE SODIUM 40 MG: 40 TABLET, DELAYED RELEASE ORAL at 09:12

## 2021-12-09 RX ADMIN — POTASSIUM CHLORIDE 30 MEQ: 750 TABLET, FILM COATED, EXTENDED RELEASE ORAL at 10:12

## 2021-12-09 RX ADMIN — METOPROLOL TARTRATE 50 MG: 50 TABLET, FILM COATED ORAL at 09:12

## 2021-12-09 RX ADMIN — INSULIN DETEMIR 15 UNITS: 100 INJECTION, SOLUTION SUBCUTANEOUS at 09:12

## 2021-12-09 RX ADMIN — Medication 2 CAPSULE: at 06:12

## 2021-12-09 RX ADMIN — ENOXAPARIN SODIUM 40 MG: 40 INJECTION SUBCUTANEOUS at 06:12

## 2021-12-09 RX ADMIN — SODIUM BICARBONATE 650 MG: 650 TABLET ORAL at 03:12

## 2021-12-09 RX ADMIN — Medication 2 CAPSULE: at 09:12

## 2021-12-09 RX ADMIN — THERA TABS 1 TABLET: TAB at 09:12

## 2021-12-09 RX ADMIN — LOSARTAN POTASSIUM 25 MG: 25 TABLET, FILM COATED ORAL at 09:12

## 2021-12-09 RX ADMIN — Medication 2 CAPSULE: at 03:12

## 2021-12-09 RX ADMIN — AMLODIPINE BESYLATE 10 MG: 10 TABLET ORAL at 09:12

## 2021-12-09 RX ADMIN — PIPERACILLIN AND TAZOBACTAM 4.5 G: 4; .5 INJECTION, POWDER, LYOPHILIZED, FOR SOLUTION INTRAVENOUS at 03:12

## 2021-12-09 RX ADMIN — AMOXICILLIN AND CLAVULANATE POTASSIUM 1 TABLET: 875; 125 TABLET, FILM COATED ORAL at 09:12

## 2021-12-09 RX ADMIN — COLLAGENASE SANTYL: 250 OINTMENT TOPICAL at 09:12

## 2021-12-10 VITALS
TEMPERATURE: 99 F | HEIGHT: 73 IN | WEIGHT: 306.69 LBS | DIASTOLIC BLOOD PRESSURE: 86 MMHG | SYSTOLIC BLOOD PRESSURE: 151 MMHG | HEART RATE: 82 BPM | OXYGEN SATURATION: 100 % | BODY MASS INDEX: 40.65 KG/M2 | RESPIRATION RATE: 18 BRPM

## 2021-12-10 LAB
ABO + RH BLD: NORMAL
ALBUMIN SERPL BCP-MCNC: 1.5 G/DL (ref 3.5–5)
ALBUMIN/GLOB SERPL: 0.2 {RATIO}
ALP SERPL-CCNC: 161 U/L (ref 45–115)
ALT SERPL W P-5'-P-CCNC: 38 U/L (ref 16–61)
ANION GAP SERPL CALCULATED.3IONS-SCNC: 14 MMOL/L (ref 7–16)
AST SERPL W P-5'-P-CCNC: 39 U/L (ref 15–37)
BASOPHILS # BLD AUTO: 0.04 K/UL (ref 0–0.2)
BASOPHILS NFR BLD AUTO: 0.4 % (ref 0–1)
BILIRUB SERPL-MCNC: 0.2 MG/DL (ref 0–1.2)
BLD PROD TYP BPU: NORMAL
BLOOD UNIT EXPIRATION DATE: NORMAL
BLOOD UNIT TYPE CODE: 5100
BUN SERPL-MCNC: 32 MG/DL (ref 7–18)
BUN/CREAT SERPL: 14 (ref 6–20)
CALCIUM SERPL-MCNC: 8.6 MG/DL (ref 8.5–10.1)
CHLORIDE SERPL-SCNC: 112 MMOL/L (ref 98–107)
CO2 SERPL-SCNC: 20 MMOL/L (ref 21–32)
CREAT SERPL-MCNC: 2.35 MG/DL (ref 0.7–1.3)
CROSSMATCH INTERPRETATION: NORMAL
DIFFERENTIAL METHOD BLD: ABNORMAL
DISPENSE STATUS: NORMAL
EOSINOPHIL # BLD AUTO: 0.34 K/UL (ref 0–0.5)
EOSINOPHIL NFR BLD AUTO: 3.8 % (ref 1–4)
ERYTHROCYTE [DISTWIDTH] IN BLOOD BY AUTOMATED COUNT: 14.4 % (ref 11.5–14.5)
GLOBULIN SER-MCNC: 6.1 G/DL (ref 2–4)
GLUCOSE SERPL-MCNC: 192 MG/DL (ref 74–106)
GLUCOSE SERPL-MCNC: 217 MG/DL (ref 70–105)
HCT VFR BLD AUTO: 21.5 % (ref 40–54)
HGB BLD-MCNC: 6.6 G/DL (ref 13.5–18)
IMM GRANULOCYTES # BLD AUTO: 0.06 K/UL (ref 0–0.04)
IMM GRANULOCYTES NFR BLD: 0.7 % (ref 0–0.4)
LYMPHOCYTES # BLD AUTO: 1.56 K/UL (ref 1–4.8)
LYMPHOCYTES NFR BLD AUTO: 17.4 % (ref 27–41)
MCH RBC QN AUTO: 28.6 PG (ref 27–31)
MCHC RBC AUTO-ENTMCNC: 30.7 G/DL (ref 32–36)
MCV RBC AUTO: 93.1 FL (ref 80–96)
MONOCYTES # BLD AUTO: 0.71 K/UL (ref 0–0.8)
MONOCYTES NFR BLD AUTO: 7.9 % (ref 2–6)
MPC BLD CALC-MCNC: 10.6 FL (ref 9.4–12.4)
NEUTROPHILS # BLD AUTO: 6.24 K/UL (ref 1.8–7.7)
NEUTROPHILS NFR BLD AUTO: 69.8 % (ref 53–65)
NRBC # BLD AUTO: 0 X10E3/UL
NRBC, AUTO (.00): 0 %
PLATELET # BLD AUTO: 399 K/UL (ref 150–400)
POTASSIUM SERPL-SCNC: 3.6 MMOL/L (ref 3.5–5.1)
PROT SERPL-MCNC: 7.6 G/DL (ref 6.4–8.2)
RBC # BLD AUTO: 2.31 M/UL (ref 4.6–6.2)
SODIUM SERPL-SCNC: 142 MMOL/L (ref 136–145)
UNIT NUMBER: NORMAL
WBC # BLD AUTO: 8.95 K/UL (ref 4.5–11)

## 2021-12-10 PROCEDURE — 80053 COMPREHEN METABOLIC PANEL: CPT | Performed by: INTERNAL MEDICINE

## 2021-12-10 PROCEDURE — 36415 COLL VENOUS BLD VENIPUNCTURE: CPT | Performed by: INTERNAL MEDICINE

## 2021-12-10 PROCEDURE — 25000003 PHARM REV CODE 250: Performed by: NURSE PRACTITIONER

## 2021-12-10 PROCEDURE — 82962 GLUCOSE BLOOD TEST: CPT

## 2021-12-10 PROCEDURE — 63700000 PHARM REV CODE 250 ALT 637 W/O HCPCS: Performed by: INTERNAL MEDICINE

## 2021-12-10 PROCEDURE — 36430 TRANSFUSION BLD/BLD COMPNT: CPT

## 2021-12-10 PROCEDURE — 85025 COMPLETE CBC W/AUTO DIFF WBC: CPT | Performed by: INTERNAL MEDICINE

## 2021-12-10 PROCEDURE — 25000003 PHARM REV CODE 250: Performed by: STUDENT IN AN ORGANIZED HEALTH CARE EDUCATION/TRAINING PROGRAM

## 2021-12-10 PROCEDURE — 63600175 PHARM REV CODE 636 W HCPCS: Performed by: INTERNAL MEDICINE

## 2021-12-10 PROCEDURE — 25000003 PHARM REV CODE 250: Performed by: INTERNAL MEDICINE

## 2021-12-10 PROCEDURE — 25000003 PHARM REV CODE 250: Performed by: SURGERY

## 2021-12-10 PROCEDURE — P9016 RBC LEUKOCYTES REDUCED: HCPCS | Performed by: INTERNAL MEDICINE

## 2021-12-10 RX ORDER — LANOLIN ALCOHOL/MO/W.PET/CERES
1 CREAM (GRAM) TOPICAL 2 TIMES DAILY
Status: DISCONTINUED | OUTPATIENT
Start: 2021-12-10 | End: 2021-12-10 | Stop reason: HOSPADM

## 2021-12-10 RX ORDER — HYDROCODONE BITARTRATE AND ACETAMINOPHEN 500; 5 MG/1; MG/1
TABLET ORAL
Status: DISCONTINUED | OUTPATIENT
Start: 2021-12-10 | End: 2021-12-10 | Stop reason: HOSPADM

## 2021-12-10 RX ADMIN — INSULIN ASPART 14 UNITS: 100 INJECTION, SOLUTION INTRAVENOUS; SUBCUTANEOUS at 05:12

## 2021-12-10 RX ADMIN — PANTOPRAZOLE SODIUM 40 MG: 40 TABLET, DELAYED RELEASE ORAL at 11:12

## 2021-12-10 RX ADMIN — FLUCONAZOLE 100 MG: 100 TABLET ORAL at 11:12

## 2021-12-10 RX ADMIN — SODIUM BICARBONATE 650 MG: 650 TABLET ORAL at 11:12

## 2021-12-10 RX ADMIN — THERA TABS 1 TABLET: TAB at 11:12

## 2021-12-10 RX ADMIN — LOSARTAN POTASSIUM 25 MG: 25 TABLET, FILM COATED ORAL at 11:12

## 2021-12-10 RX ADMIN — AMLODIPINE BESYLATE 10 MG: 10 TABLET ORAL at 11:12

## 2021-12-10 RX ADMIN — HYDROCODONE BITARTRATE AND ACETAMINOPHEN 1 TABLET: 5; 325 TABLET ORAL at 12:12

## 2021-12-10 RX ADMIN — AMOXICILLIN AND CLAVULANATE POTASSIUM 1 TABLET: 875; 125 TABLET, FILM COATED ORAL at 11:12

## 2021-12-10 RX ADMIN — FOLIC ACID 1 MG: 1 TABLET ORAL at 11:12

## 2021-12-10 RX ADMIN — FERROUS SULFATE TAB 325 MG (65 MG ELEMENTAL FE) 1 EACH: 325 (65 FE) TAB at 11:12

## 2021-12-10 RX ADMIN — POTASSIUM CHLORIDE 10 MEQ: 750 TABLET, FILM COATED, EXTENDED RELEASE ORAL at 11:12

## 2021-12-10 RX ADMIN — METOPROLOL TARTRATE 50 MG: 50 TABLET, FILM COATED ORAL at 11:12

## 2021-12-10 RX ADMIN — Medication 2 CAPSULE: at 11:12

## 2021-12-12 LAB
ABO + RH BLD: NORMAL
BLD PROD TYP BPU: NORMAL
BLOOD UNIT EXPIRATION DATE: NORMAL
BLOOD UNIT TYPE CODE: 5100
CROSSMATCH INTERPRETATION: NORMAL
DISPENSE STATUS: NORMAL
UNIT NUMBER: NORMAL

## 2022-01-03 ENCOUNTER — OFFICE VISIT (OUTPATIENT)
Dept: SURGERY | Facility: CLINIC | Age: 47
End: 2022-01-03
Attending: STUDENT IN AN ORGANIZED HEALTH CARE EDUCATION/TRAINING PROGRAM
Payer: COMMERCIAL

## 2022-01-03 VITALS — HEIGHT: 73 IN | BODY MASS INDEX: 40.46 KG/M2

## 2022-01-03 DIAGNOSIS — Z93.3 S/P COLOSTOMY: ICD-10-CM

## 2022-01-03 DIAGNOSIS — M72.6 NECROTIZING FASCIITIS: Primary | ICD-10-CM

## 2022-01-03 PROCEDURE — 1159F MED LIST DOCD IN RCRD: CPT | Mod: CPTII,,, | Performed by: STUDENT IN AN ORGANIZED HEALTH CARE EDUCATION/TRAINING PROGRAM

## 2022-01-03 PROCEDURE — 1159F PR MEDICATION LIST DOCUMENTED IN MEDICAL RECORD: ICD-10-PCS | Mod: CPTII,,, | Performed by: STUDENT IN AN ORGANIZED HEALTH CARE EDUCATION/TRAINING PROGRAM

## 2022-01-03 PROCEDURE — 3008F PR BODY MASS INDEX (BMI) DOCUMENTED: ICD-10-PCS | Mod: CPTII,,, | Performed by: STUDENT IN AN ORGANIZED HEALTH CARE EDUCATION/TRAINING PROGRAM

## 2022-01-03 PROCEDURE — 99213 OFFICE O/P EST LOW 20 MIN: CPT | Mod: PBBFAC | Performed by: STUDENT IN AN ORGANIZED HEALTH CARE EDUCATION/TRAINING PROGRAM

## 2022-01-03 PROCEDURE — 99024 POSTOP FOLLOW-UP VISIT: CPT | Mod: ,,, | Performed by: STUDENT IN AN ORGANIZED HEALTH CARE EDUCATION/TRAINING PROGRAM

## 2022-01-03 PROCEDURE — 3008F BODY MASS INDEX DOCD: CPT | Mod: CPTII,,, | Performed by: STUDENT IN AN ORGANIZED HEALTH CARE EDUCATION/TRAINING PROGRAM

## 2022-01-03 PROCEDURE — 99024 PR POST-OP FOLLOW-UP VISIT: ICD-10-PCS | Mod: ,,, | Performed by: STUDENT IN AN ORGANIZED HEALTH CARE EDUCATION/TRAINING PROGRAM

## 2022-01-04 NOTE — PROGRESS NOTES
Subjective:       Patient ID: Chaim Johnson is a 46 y.o. male.    Chief Complaint: Follow-up (F/u)    46-year-old  male presents the clinic for postop evaluation status post left for debridement for necrotizing fasciitis and colostomy formation.  During his hospital stay, the patient had to have his colostomy revised due to stoma necrosis via an exploratory laparotomy.  The stoma is functioning well with stool.  Staples in place.  Wound is granulation tissue coverage, no necrotic tissue or purulent drainage.  Denies any chest pain/shortness of breath, nausea/vomiting/diarrhea, fever/chills.    Review of Systems   Constitutional: Negative.    HENT: Negative.    Eyes: Negative.    Respiratory: Negative for shortness of breath.    Cardiovascular: Negative.    Gastrointestinal: Negative for abdominal pain, blood in stool, change in bowel habit, vomiting and change in bowel habit.   Endocrine: Negative.    Genitourinary: Negative.  Negative for hematuria.   Musculoskeletal: Negative for back pain and myalgias.   Integumentary:  Negative.   Allergic/Immunologic: Negative.    Neurological: Negative for dizziness, weakness and light-headedness.   Psychiatric/Behavioral: Negative.          Objective:      Physical Exam  Constitutional:       General: He is not in acute distress.     Appearance: Normal appearance.   HENT:      Head: Normocephalic.      Nose: Nose normal.   Eyes:      Pupils: Pupils are equal, round, and reactive to light.   Cardiovascular:      Rate and Rhythm: Normal rate.   Pulmonary:      Effort: Pulmonary effort is normal. No respiratory distress.   Abdominal:      Palpations: Abdomen is soft.      Tenderness: There is no abdominal tenderness. There is no guarding or rebound.          Comments: Healed midline abdominal incision; staples in place.    Colostomy with stool; pink stoma   Musculoskeletal:         General: Normal range of motion.      Cervical back: Normal range of motion.       Comments: Left buttock wound extending close to the rectum with granulation tissue forming over wound bed and serous drainage, no purulent drainage or signs of infection or necrosis   Skin:     General: Skin is warm.      Coloration: Skin is not jaundiced.   Neurological:      General: No focal deficit present.      Mental Status: He is alert and oriented to person, place, and time.      Cranial Nerves: No cranial nerve deficit.   Psychiatric:         Mood and Affect: Mood normal.         Assessment:       Problem List Items Addressed This Visit        GI    S/P colostomy       Orthopedic    Necrotizing fasciitis - Primary          Plan:       Patient is planning to see a plastic surgeon for skin grafting and/or flap coverage.  Once this area has healed surgery, the patient will follow back up with Tripp Reno DO for ostomy reversal.  Staples removed

## 2022-05-17 ENCOUNTER — OFFICE VISIT (OUTPATIENT)
Dept: SURGERY | Facility: CLINIC | Age: 47
End: 2022-05-17
Payer: COMMERCIAL

## 2022-05-17 DIAGNOSIS — Z93.3 S/P COLOSTOMY: Primary | ICD-10-CM

## 2022-05-17 PROCEDURE — 1159F PR MEDICATION LIST DOCUMENTED IN MEDICAL RECORD: ICD-10-PCS | Mod: CPTII,,, | Performed by: STUDENT IN AN ORGANIZED HEALTH CARE EDUCATION/TRAINING PROGRAM

## 2022-05-17 PROCEDURE — 1159F MED LIST DOCD IN RCRD: CPT | Mod: CPTII,,, | Performed by: STUDENT IN AN ORGANIZED HEALTH CARE EDUCATION/TRAINING PROGRAM

## 2022-05-17 PROCEDURE — 99214 PR OFFICE/OUTPT VISIT, EST, LEVL IV, 30-39 MIN: ICD-10-PCS | Mod: S$PBB,,, | Performed by: STUDENT IN AN ORGANIZED HEALTH CARE EDUCATION/TRAINING PROGRAM

## 2022-05-17 PROCEDURE — 99213 OFFICE O/P EST LOW 20 MIN: CPT | Mod: PBBFAC | Performed by: STUDENT IN AN ORGANIZED HEALTH CARE EDUCATION/TRAINING PROGRAM

## 2022-05-17 PROCEDURE — 99214 OFFICE O/P EST MOD 30 MIN: CPT | Mod: S$PBB,,, | Performed by: STUDENT IN AN ORGANIZED HEALTH CARE EDUCATION/TRAINING PROGRAM

## 2022-05-17 RX ORDER — METRONIDAZOLE 500 MG/1
1000 TABLET ORAL 3 TIMES DAILY
Qty: 6 TABLET | Refills: 0 | Status: SHIPPED | OUTPATIENT
Start: 2022-05-17 | End: 2022-05-18

## 2022-05-17 RX ORDER — NEOMYCIN SULFATE 500 MG/1
1000 TABLET ORAL 3 TIMES DAILY
Qty: 6 TABLET | Refills: 0 | Status: SHIPPED | OUTPATIENT
Start: 2022-05-17 | End: 2022-05-18

## 2022-05-17 NOTE — PROGRESS NOTES
Subjective:       Patient ID: Chaim Johnson is a 46 y.o. male.    Chief Complaint: Follow-up (Discuss colostomy reversal)    46-year-old  male presents to the clinic for evaluation for possible colostomy reversal.  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  This area has healed.  In the meantime the patient has developed some wounds on his foot with some osteomyelitis and is currently receiving IV antibiotics; patient is being managed at [facility] by a surgeon at that location.  Denies any chest pain/shortness of breath, nausea/vomiting/diarrhea, fever/chills.    Review of Systems   Constitutional: Negative.    HENT: Negative.    Eyes: Negative.    Respiratory: Negative for shortness of breath.    Cardiovascular: Negative.    Gastrointestinal: Negative for abdominal pain, blood in stool, change in bowel habit, vomiting and change in bowel habit.   Endocrine: Negative.    Genitourinary: Negative.  Negative for hematuria.   Musculoskeletal: Negative for back pain and myalgias.   Integumentary:  Negative.   Allergic/Immunologic: Negative.    Neurological: Negative for dizziness, weakness and light-headedness.   Psychiatric/Behavioral: Negative.          Objective:      Physical Exam  Constitutional:       General: He is not in acute distress.     Appearance: Normal appearance.   HENT:      Head: Normocephalic.      Nose: Nose normal.   Eyes:      Pupils: Pupils are equal, round, and reactive to light.   Cardiovascular:      Rate and Rhythm: Normal rate.   Pulmonary:      Effort: Pulmonary effort is normal. No respiratory distress.   Abdominal:      Palpations: Abdomen is soft.      Tenderness: There is no abdominal tenderness. There is no guarding or rebound.          Comments: Pink stoma with adequate stool output   Musculoskeletal:         General: Normal range of  motion.      Cervical back: Normal range of motion.   Feet:      Comments: Dressing to right foot; PICC line in place to left upper extremity  Skin:     General: Skin is warm.      Coloration: Skin is not jaundiced.   Neurological:      General: No focal deficit present.      Mental Status: He is alert and oriented to person, place, and time.      Cranial Nerves: No cranial nerve deficit.   Psychiatric:         Mood and Affect: Mood normal.         Assessment:       Problem List Items Addressed This Visit        GI    S/P colostomy - Primary          Plan:       I would not suggest a reversal while the patient is undergoing long-term IV antibiotics for this left foot wound/osteomyelitis.  Once he is cleared all the antibiotics and is cleared from the infection on his foot by his managing physician, then we will set the patient up for a colostomy reversal.    We will attempt a robot assisted colostomy reversal, possible open.  We instructed the patient to contact our office once he has completely healed from this wound on his foot in finished IV antibiotics.

## 2022-07-05 ENCOUNTER — OFFICE VISIT (OUTPATIENT)
Dept: SURGERY | Facility: CLINIC | Age: 47
End: 2022-07-05
Payer: COMMERCIAL

## 2022-07-05 DIAGNOSIS — Z93.3 S/P COLOSTOMY: Primary | ICD-10-CM

## 2022-07-05 PROCEDURE — 99214 PR OFFICE/OUTPT VISIT, EST, LEVL IV, 30-39 MIN: ICD-10-PCS | Mod: S$PBB,,, | Performed by: STUDENT IN AN ORGANIZED HEALTH CARE EDUCATION/TRAINING PROGRAM

## 2022-07-05 PROCEDURE — 1159F PR MEDICATION LIST DOCUMENTED IN MEDICAL RECORD: ICD-10-PCS | Mod: CPTII,,, | Performed by: STUDENT IN AN ORGANIZED HEALTH CARE EDUCATION/TRAINING PROGRAM

## 2022-07-05 PROCEDURE — 99214 OFFICE O/P EST MOD 30 MIN: CPT | Mod: S$PBB,,, | Performed by: STUDENT IN AN ORGANIZED HEALTH CARE EDUCATION/TRAINING PROGRAM

## 2022-07-05 PROCEDURE — 1159F MED LIST DOCD IN RCRD: CPT | Mod: CPTII,,, | Performed by: STUDENT IN AN ORGANIZED HEALTH CARE EDUCATION/TRAINING PROGRAM

## 2022-07-05 PROCEDURE — 99214 OFFICE O/P EST MOD 30 MIN: CPT | Mod: PBBFAC | Performed by: STUDENT IN AN ORGANIZED HEALTH CARE EDUCATION/TRAINING PROGRAM

## 2022-07-05 RX ORDER — SODIUM CHLORIDE 9 MG/ML
INJECTION, SOLUTION INTRAVENOUS CONTINUOUS
Status: CANCELLED | OUTPATIENT
Start: 2022-07-05

## 2022-07-05 RX ORDER — METRONIDAZOLE 500 MG/100ML
500 INJECTION, SOLUTION INTRAVENOUS
Status: CANCELLED | OUTPATIENT
Start: 2022-07-05

## 2022-07-05 NOTE — PROGRESS NOTES
Subjective:       Patient ID: Chaim Johnson is a 46 y.o. male.    Chief Complaint: Follow-up (Schedule surgery)    46-year-old  male presents to clinic for evaluation for colostomy reversal.  Patient had Veena's gangrene at a previous hospital admission, requiring diverting colostomy for wound healing.  Patient was followed by a plastic surgeon for wound closure this area is completely healed.  Patient did have a diabetic foot wound on his left foot requiring IV antibiotics and now has been given clearance for colostomy reversal.  Denies any chest pain/shortness of breath, nausea/vomiting/diarrhea, fever/chills.  No family history of colon cancer, patient has never had a colonoscopy.    Review of Systems   Constitutional: Negative.    HENT: Negative.    Eyes: Negative.    Respiratory: Negative for shortness of breath.    Cardiovascular: Negative.    Gastrointestinal: Negative for abdominal pain, blood in stool, change in bowel habit, vomiting and change in bowel habit.   Endocrine: Negative.    Genitourinary: Negative.  Negative for hematuria.   Musculoskeletal: Negative for back pain and myalgias.   Integumentary:  Negative.   Allergic/Immunologic: Negative.    Neurological: Negative for dizziness, weakness and light-headedness.   Psychiatric/Behavioral: Negative.          Objective:      Physical Exam  Constitutional:       General: He is not in acute distress.     Appearance: Normal appearance.   HENT:      Head: Normocephalic.      Nose: Nose normal.   Eyes:      Pupils: Pupils are equal, round, and reactive to light.   Cardiovascular:      Rate and Rhythm: Normal rate.   Pulmonary:      Effort: Pulmonary effort is normal. No respiratory distress.   Abdominal:      Palpations: Abdomen is soft.      Tenderness: There is no abdominal tenderness. There is no guarding or rebound.   Musculoskeletal:         General: Normal range of motion.      Cervical back: Normal range of motion.   Skin:      General: Skin is warm.      Coloration: Skin is not jaundiced.   Neurological:      General: No focal deficit present.      Mental Status: He is alert and oriented to person, place, and time.      Cranial Nerves: No cranial nerve deficit.   Psychiatric:         Mood and Affect: Mood normal.         Assessment:       Problem List Items Addressed This Visit        GI    S/P colostomy - Primary          Plan:       Will set the patient up for colonoscopy on July 27th.  Patient will be off his Eliquis for 3 days.  The following day we will do robotic colostomy reversal, possible open, with wound VAC placement as long as there are no complications or significant findings on colonoscopy day prior.    Patient will need a CBC, BMP and COVID test

## 2022-07-05 NOTE — PATIENT INSTRUCTIONS
"  Colonoscopy Prep Instructions    Procedure date/time: 7/27/2022  Prep Day: 7/26/2022  Procedure Place: [facility]  Performing Physician: Dr. Tripp Palmer    Your doctor has ordered a colonoscopy to visually examine the large intestine. He will be able tot see the lining of the colon in order to diagnose or rule out the presence of polyps, inflammation, diverticulosis, and masses. He will also be able to remove tissue for examination (biopsy) and colon polyps.    In order for the doctor to perform a colonoscopy, a bowel "cleanout" must be completed at home prior to the procedure. A bowel cleanout is a combination of a clear liquid diet and oral laxatives. All the items can be obtained at your local pharmacy or [employer] without a prescription.    LIST OF ITEMS YOU WILL NEED TO PURCHASE:  One - 238 gram container of MiraLax laxative  Four - Dulcolax laxative tablets, NOT stool softeners and NOT suppositories.  Two - 32 once/quart bottles of Gatorade or Powerade (any color except red)   (Diabetics may use Powerade Zero)    Prep day is the day before the colonoscopy:  Only clear liquids are allowed. Use of anything other than clear liquids will lessen the quality and safety of the colonoscopy. Clear liquids are any liquid that you can see through including black coffee, tea, soda, sports drinks (Gatorade, Powerade, etc.), Huseyin-Aid, Popsicles, Jell-O, clear broths, and clear juices. NO RED ITEMS OF ANY KIND, NO ALCOHOL, NO MILK OR NON-DAIRY CREAMERS, NO JUICE WITH PULP, NO LIQUIDS YOU CAN NOT SEE THROUGH. Diabetics should use sugar free drinks during the prep and monitor your blood sugar closely to prevent low blood sugar.    Drink plenty of clear liquids all day to help prevent dehydration. You should drink a minimum of 2 quarts of clear liquids before starting the MiraLax solution at 5 PM.    Take your regular medication the day before you prep except for any medications you were instructed not to " take.    Iron tablets and blood thinners such as Coumadin, Warfarin, Jantoven, or Pradaxa are to be stopped for 3 day before the procedure. Stop Plavix for a total of 5 days prior to the procedure.    If you are taking Effient, Xarelto, Eliquis, or Brilinta, consult your cardiologist before discontinuing this medication and before having this procedure.    Metformin (Glucophage, Glucovance, Glumetza, Fortamet, Avandamet, Riomet, Actoplus Met, PrandiMet, and Janumet) are not to be taken the evening before your colonoscopy.    Insulin should not be taken the night before or morning of your procedure    On Prep Day (day before your procedure):  9 AM - take 4 Dulcolax tablets with 8 ounces of water  5 PM - mix 7 capfuls of MiraLax in 1 quart of Gatorade or Powerade. Stir until completely dissolved. Drink an 8 ounce glass of the solution every 15 minutes until you finish the mixture.  Drink more clear liquids  8 PM - mix the rest of the MiraLax in 1 quart of Gatorade or Powerade. Stir until completely dissolved. Drink an 8 ounce glass of the solution every 15 minutes until you finish the mixture.  Drink more clear liquids. You may drink clear liquids until 6 hours before your procedure.      You are READY for the colonoscopy if you have followed all instructions and your stool is no longer formed, but clear or yellow liquid.    DO NOT eat or drink anything else, including water, lozenges, or chew gum 6 hours before your procedure.    DO NOT take your medication the morning that you are having your colonoscopy unless you have been instructed to take specific medications as listed below.    Blood pressure, cardiac (heart) and seizure medications, if normally taken in the morning, should be taken with a small sip of water in the morning of the procedure.    Respiratory inhalers should be used the morning of the procedure and you should bring it with you.    If you are a diabetic, please check your blood sugar the morning of  your colonoscopy.    You cannot drive home after your colonoscopy due to IV anesthesia and you should not drive that day. You must make arrangements to have someone to take you home. You should take the rest of the day off and not operate any machinery, go to work, or sign any legal documents for the rest of the day. You should be able to return to work or normal activities the following morning.    Preparation quality is your responsibility-- it is critical you follow these instructions. The effectiveness of your colonoscopy in finding polyps and preventing cancer is largely dependent upon you providing and excellent colon preparation. Thanks for helping us help you.    Failure to adhere to preparation instructions may result in cancellation and rescheduling of your procedure.      Helpful tips:  Some people may develop nausea with vomiting during the prep. The best remedy for this is to take a break from drinking the solution for about 30 minutes and resume drinking at a slower pace. It is important to drink the entire contents of the solution.  Walking between drinking each glass can help with bloating.  Use baby wipes or medicated wipes instead of toilet paper.  Apply some Vaseline or Desitin to the anal area between bowel movements.      Someone will contact you concerning time and specifics of your appointment.      Please contact your physician at [phone] for any questions or concerns.                                                                                                                                                                                                            [facility]                                                                                                                                                                                                                                                                                                                                                                                                                                                        Preoperative Instructions        Your pre-op lab work and Covid testing will be on 7/25/2022 on the 1st floor of the [facility] building.                                                                   Day of Surgery      Your surgery is scheduled for 7/28/2022 at [facility] on the ground floor of the Ambulatory building.     Your arrival time is 0600 AM.              DO NOT EAT OR DRINK ANYTHING AFTER MIDNIGHT.          You may take blood pressure medication with a small drink of water the morning of surgery.                                 DO NOT TAKE INSULIN OR ANY OTHER BLOOD SUGAR MEDICATIONS.           The following blood sugar medications have to be stopped 48 hours prior to surgery:                    Metformin        Glucovance          Metaglip             Fortamet           Glucophage                   Riomet             Avandamet          Glimepiride              IF YOU ARE ON ANY OF THESE BLOOD THINNERS, MAKE SURE YOUR PHYSICIAN IS AWARE.                Eliquis/Apixaban             Xarelto/Rivaroxaban             Plavix/Clopidogrel                  Wafarin/Coumadin,Jantoven           Pletal/Cilostazol              Pradaxa/Dibigatran                   Last dose of Eliquis will be on 7/23/2022 until after surgery.            PLEASE USE CHLORHEXIDINE WASH THE NIGHT BEFORE SURGERY AND THE MORNING OF SURGERY.             YOU CANNOT DRIVE YOURSELF HOME FROM THE HOSPITAL THE DAY OF SURGERY.        Please have a  with you.           Bring all medications, that you are currently taking, with you to the hospital the morning of your procedure.           Please leave all valuables at home.          Children under the age of 18 must be accompanied by an adult.          PLEASE UNDERSTAND THAT OUR OFFICE DOES NOT GIVE PATHOLOGY RESULTS OR TEST RESULTS OVER THE  PHONE.         THIS WILL BE DISCUSSED WITH YOU ON YOUR FOLLOW UP APPOINTMENT TO DISCUSS IN PERSON.     [facility]  PREOPERATIVE INSTRUCTIONS      PREP DAY FOR SURGERY 7/26/22  SURGERY DAY 7/28/22      PLEASE HAVE YOUR PRESCRIPTIONS FILLED AND TAKE AS DIRECTED THE DAY PRIOR TO SURGERY    CLEAR LIQUIDS ONLY THE DAY BEFORE SURGERY    CLEAR LIQUIDS CAN BE:   GATORADE, NON PULPY UNSWEETENED JUICES, SUGAR FREE SODA, BLACK COFFEE  OR TEA WITHOUT SUGAR OR MILK, JELLO (NO RED OR PURPLE), CLEAR SOUP.    DO NOT DRINK MILK OR CREAM.    9:00 A.M.    Drink 1 bottle of Magnesium Citrate.  NOON:        Clear liquid lunch. Drink 1 bottle of Magnesium Citrate.  1:00 P.M.    Take Flagyl 1 GM and Neomycin 1 GM by mouth.  2:00 P.M.    Drink 8oz. of water.  3:00 P.M.    Take Flagyl 1 GM and Neomycin 1 GM by mouth.  4:00 P.M.    Drink 8oz. of water.  6:00 P.M.    Drink 8oz. of water and take 2 Dulcolax tablets.  6:30 P.M.    Liquid supper.  10:00 P.M.  Drink 8oz. of water.  11:00 P.M.  Take Flagyl 1 GM and Neomycin 1 GM by mouth.    DO NOT EAT OR DRINK AFTER MIDNIGHT OR MORNING OF SURGERY.

## 2022-07-21 ENCOUNTER — TELEPHONE (OUTPATIENT)
Dept: SURGERY | Facility: CLINIC | Age: 47
End: 2022-07-21
Payer: COMMERCIAL

## 2022-07-21 NOTE — TELEPHONE ENCOUNTER
Spoke with Mr Johnson and confirmed colonoscopy for 7/27/22 at [facility]. Discussed pre-op and prep instructions. Verbalized understanding. Patient will need to be Covid tested, he will do this on 7/25/22. Patient is taking Eliquis. His last dose of Eliquis will be on 7/23/22. Patient told to arrive at 0700 AM.     Patient is scheduled for surgery to reverse colostomy on 7/28/22 at [facility] with Dr. Palmer. Discussed pre-op instructions with patient.    Verbalized understanding.

## 2022-07-28 ENCOUNTER — HOSPITAL ENCOUNTER (INPATIENT)
Facility: HOSPITAL | Age: 47
LOS: 8 days | Discharge: HOME HEALTH | DRG: 329 | End: 2022-08-05
Attending: STUDENT IN AN ORGANIZED HEALTH CARE EDUCATION/TRAINING PROGRAM | Admitting: STUDENT IN AN ORGANIZED HEALTH CARE EDUCATION/TRAINING PROGRAM
Payer: COMMERCIAL

## 2022-07-28 ENCOUNTER — ANESTHESIA (OUTPATIENT)
Dept: SURGERY | Facility: HOSPITAL | Age: 47
DRG: 329 | End: 2022-07-28
Payer: COMMERCIAL

## 2022-07-28 ENCOUNTER — ANESTHESIA EVENT (OUTPATIENT)
Dept: SURGERY | Facility: HOSPITAL | Age: 47
DRG: 329 | End: 2022-07-28
Payer: COMMERCIAL

## 2022-07-28 VITALS
DIASTOLIC BLOOD PRESSURE: 76 MMHG | SYSTOLIC BLOOD PRESSURE: 122 MMHG | OXYGEN SATURATION: 99 % | RESPIRATION RATE: 24 BRPM | HEART RATE: 74 BPM

## 2022-07-28 DIAGNOSIS — Z93.3 S/P COLOSTOMY: ICD-10-CM

## 2022-07-28 DIAGNOSIS — G47.33 OSA (OBSTRUCTIVE SLEEP APNEA): ICD-10-CM

## 2022-07-28 DIAGNOSIS — N18.31 TYPE 2 DIABETES MELLITUS WITH STAGE 3A CHRONIC KIDNEY DISEASE, WITH LONG-TERM CURRENT USE OF INSULIN: ICD-10-CM

## 2022-07-28 DIAGNOSIS — L08.9 DIABETIC FOOT INFECTION: Primary | ICD-10-CM

## 2022-07-28 DIAGNOSIS — Z79.4 TYPE 2 DIABETES MELLITUS WITH STAGE 3A CHRONIC KIDNEY DISEASE, WITH LONG-TERM CURRENT USE OF INSULIN: ICD-10-CM

## 2022-07-28 DIAGNOSIS — E11.628 DIABETIC FOOT INFECTION: Primary | ICD-10-CM

## 2022-07-28 DIAGNOSIS — I10 BENIGN ESSENTIAL HTN: ICD-10-CM

## 2022-07-28 DIAGNOSIS — E11.22 TYPE 2 DIABETES MELLITUS WITH STAGE 3A CHRONIC KIDNEY DISEASE, WITH LONG-TERM CURRENT USE OF INSULIN: ICD-10-CM

## 2022-07-28 DIAGNOSIS — Z48.89 AFTERCARE FOLLOWING SURGERY: ICD-10-CM

## 2022-07-28 PROBLEM — E11.9 DIABETES MELLITUS: Status: ACTIVE | Noted: 2022-07-28

## 2022-07-28 LAB
EST. AVERAGE GLUCOSE BLD GHB EST-MCNC: 167 MG/DL
GLUCOSE SERPL-MCNC: 172 MG/DL (ref 70–105)
GLUCOSE SERPL-MCNC: 178 MG/DL (ref 70–105)
GLUCOSE SERPL-MCNC: 208 MG/DL (ref 70–105)
HBA1C MFR BLD HPLC: 7.6 % (ref 4.5–6.6)

## 2022-07-28 PROCEDURE — 44626 REPAIR BOWEL OPENING: CPT | Mod: 22,,, | Performed by: STUDENT IN AN ORGANIZED HEALTH CARE EDUCATION/TRAINING PROGRAM

## 2022-07-28 PROCEDURE — 63600175 PHARM REV CODE 636 W HCPCS: Performed by: STUDENT IN AN ORGANIZED HEALTH CARE EDUCATION/TRAINING PROGRAM

## 2022-07-28 PROCEDURE — D9220A PRA ANESTHESIA: ICD-10-PCS | Mod: CRNA,,, | Performed by: NURSE ANESTHETIST, CERTIFIED REGISTERED

## 2022-07-28 PROCEDURE — 27100168 OPTIME MED/SURG SUP & DEVICES NON-STERILE SUPPLY: Performed by: STUDENT IN AN ORGANIZED HEALTH CARE EDUCATION/TRAINING PROGRAM

## 2022-07-28 PROCEDURE — 99222 1ST HOSP IP/OBS MODERATE 55: CPT | Mod: ,,, | Performed by: HOSPITALIST

## 2022-07-28 PROCEDURE — 36415 COLL VENOUS BLD VENIPUNCTURE: CPT | Performed by: STUDENT IN AN ORGANIZED HEALTH CARE EDUCATION/TRAINING PROGRAM

## 2022-07-28 PROCEDURE — 27000165 HC TUBE, ETT CUFFED: Performed by: ANESTHESIOLOGY

## 2022-07-28 PROCEDURE — 99900035 HC TECH TIME PER 15 MIN (STAT)

## 2022-07-28 PROCEDURE — D9220A PRA ANESTHESIA: Mod: ANES,,, | Performed by: ANESTHESIOLOGY

## 2022-07-28 PROCEDURE — 27000716 HC OXISENSOR PROBE, ANY SIZE: Performed by: ANESTHESIOLOGY

## 2022-07-28 PROCEDURE — 27000510 HC BLANKET BAIR HUGGER ANY SIZE: Performed by: ANESTHESIOLOGY

## 2022-07-28 PROCEDURE — 63600175 PHARM REV CODE 636 W HCPCS: Performed by: ANESTHESIOLOGY

## 2022-07-28 PROCEDURE — 27201423 OPTIME MED/SURG SUP & DEVICES STERILE SUPPLY: Performed by: STUDENT IN AN ORGANIZED HEALTH CARE EDUCATION/TRAINING PROGRAM

## 2022-07-28 PROCEDURE — C1729 CATH, DRAINAGE: HCPCS | Performed by: STUDENT IN AN ORGANIZED HEALTH CARE EDUCATION/TRAINING PROGRAM

## 2022-07-28 PROCEDURE — 82962 GLUCOSE BLOOD TEST: CPT

## 2022-07-28 PROCEDURE — 37000009 HC ANESTHESIA EA ADD 15 MINS: Performed by: STUDENT IN AN ORGANIZED HEALTH CARE EDUCATION/TRAINING PROGRAM

## 2022-07-28 PROCEDURE — 27000509 HC TUBE SALEM SUMP ANY SIZE: Performed by: ANESTHESIOLOGY

## 2022-07-28 PROCEDURE — 97605 NEG PRS WND THER DME<=50SQCM: CPT | Mod: ,,, | Performed by: STUDENT IN AN ORGANIZED HEALTH CARE EDUCATION/TRAINING PROGRAM

## 2022-07-28 PROCEDURE — 36000709 HC OR TIME LEV III EA ADD 15 MIN: Performed by: STUDENT IN AN ORGANIZED HEALTH CARE EDUCATION/TRAINING PROGRAM

## 2022-07-28 PROCEDURE — 27000689 HC BLADE LARYNGOSCOPE ANY SIZE: Performed by: ANESTHESIOLOGY

## 2022-07-28 PROCEDURE — C9399 UNCLASSIFIED DRUGS OR BIOLOG: HCPCS | Performed by: HOSPITALIST

## 2022-07-28 PROCEDURE — D9220A PRA ANESTHESIA: Mod: CRNA,,, | Performed by: NURSE ANESTHETIST, CERTIFIED REGISTERED

## 2022-07-28 PROCEDURE — 71000033 HC RECOVERY, INTIAL HOUR: Performed by: STUDENT IN AN ORGANIZED HEALTH CARE EDUCATION/TRAINING PROGRAM

## 2022-07-28 PROCEDURE — 25000003 PHARM REV CODE 250: Performed by: NURSE ANESTHETIST, CERTIFIED REGISTERED

## 2022-07-28 PROCEDURE — 25000003 PHARM REV CODE 250: Performed by: STUDENT IN AN ORGANIZED HEALTH CARE EDUCATION/TRAINING PROGRAM

## 2022-07-28 PROCEDURE — 83036 HEMOGLOBIN GLYCOSYLATED A1C: CPT | Performed by: STUDENT IN AN ORGANIZED HEALTH CARE EDUCATION/TRAINING PROGRAM

## 2022-07-28 PROCEDURE — 44955 PR APPENDECTOMY,W OTHR PROC: ICD-10-PCS | Mod: ,,, | Performed by: STUDENT IN AN ORGANIZED HEALTH CARE EDUCATION/TRAINING PROGRAM

## 2022-07-28 PROCEDURE — 11000001 HC ACUTE MED/SURG PRIVATE ROOM

## 2022-07-28 PROCEDURE — D9220A PRA ANESTHESIA: ICD-10-PCS | Mod: ANES,,, | Performed by: ANESTHESIOLOGY

## 2022-07-28 PROCEDURE — 37000008 HC ANESTHESIA 1ST 15 MINUTES: Performed by: STUDENT IN AN ORGANIZED HEALTH CARE EDUCATION/TRAINING PROGRAM

## 2022-07-28 PROCEDURE — 97605 PR NEG PRESS WOUND THERAPY (NPWT) W/NON-DISPOSABLE WOUND VAC DEVICE (DME), <=50 CM: ICD-10-PCS | Mod: ,,, | Performed by: STUDENT IN AN ORGANIZED HEALTH CARE EDUCATION/TRAINING PROGRAM

## 2022-07-28 PROCEDURE — 44626 PR CLOSE ENTEROSTOMY,RESEC+COLOREC ANAS: ICD-10-PCS | Mod: 22,,, | Performed by: STUDENT IN AN ORGANIZED HEALTH CARE EDUCATION/TRAINING PROGRAM

## 2022-07-28 PROCEDURE — 99222 PR INITIAL HOSPITAL CARE,LEVL II: ICD-10-PCS | Mod: ,,, | Performed by: HOSPITALIST

## 2022-07-28 PROCEDURE — S0030 INJECTION, METRONIDAZOLE: HCPCS | Performed by: STUDENT IN AN ORGANIZED HEALTH CARE EDUCATION/TRAINING PROGRAM

## 2022-07-28 PROCEDURE — 44955 APPENDECTOMY ADD-ON: CPT | Mod: ,,, | Performed by: STUDENT IN AN ORGANIZED HEALTH CARE EDUCATION/TRAINING PROGRAM

## 2022-07-28 PROCEDURE — 63600175 PHARM REV CODE 636 W HCPCS: Performed by: NURSE ANESTHETIST, CERTIFIED REGISTERED

## 2022-07-28 PROCEDURE — 63600175 PHARM REV CODE 636 W HCPCS: Performed by: HOSPITALIST

## 2022-07-28 PROCEDURE — 36000708 HC OR TIME LEV III 1ST 15 MIN: Performed by: STUDENT IN AN ORGANIZED HEALTH CARE EDUCATION/TRAINING PROGRAM

## 2022-07-28 PROCEDURE — 27000655: Performed by: ANESTHESIOLOGY

## 2022-07-28 RX ORDER — SODIUM CHLORIDE 0.9 % (FLUSH) 0.9 %
10 SYRINGE (ML) INJECTION
Status: DISCONTINUED | OUTPATIENT
Start: 2022-07-28 | End: 2022-08-05 | Stop reason: HOSPADM

## 2022-07-28 RX ORDER — MORPHINE SULFATE 8 MG/ML
4 INJECTION INTRAMUSCULAR; INTRAVENOUS; SUBCUTANEOUS EVERY 5 MIN PRN
Status: DISCONTINUED | OUTPATIENT
Start: 2022-07-28 | End: 2022-07-28 | Stop reason: HOSPADM

## 2022-07-28 RX ORDER — GABAPENTIN 300 MG/1
300 CAPSULE ORAL 2 TIMES DAILY
Status: DISCONTINUED | OUTPATIENT
Start: 2022-07-28 | End: 2022-07-28

## 2022-07-28 RX ORDER — INSULIN ASPART 100 [IU]/ML
1-10 INJECTION, SOLUTION INTRAVENOUS; SUBCUTANEOUS
Status: DISCONTINUED | OUTPATIENT
Start: 2022-07-28 | End: 2022-08-04

## 2022-07-28 RX ORDER — FENTANYL CITRATE 50 UG/ML
INJECTION, SOLUTION INTRAMUSCULAR; INTRAVENOUS
Status: DISCONTINUED | OUTPATIENT
Start: 2022-07-28 | End: 2022-07-28

## 2022-07-28 RX ORDER — SODIUM CHLORIDE, SODIUM LACTATE, POTASSIUM CHLORIDE, CALCIUM CHLORIDE 600; 310; 30; 20 MG/100ML; MG/100ML; MG/100ML; MG/100ML
INJECTION, SOLUTION INTRAVENOUS CONTINUOUS
Status: DISCONTINUED | OUTPATIENT
Start: 2022-07-28 | End: 2022-07-29

## 2022-07-28 RX ORDER — GLUCAGON 1 MG
1 KIT INJECTION
Status: DISCONTINUED | OUTPATIENT
Start: 2022-07-28 | End: 2022-08-04

## 2022-07-28 RX ORDER — LIDOCAINE HYDROCHLORIDE 20 MG/ML
INJECTION, SOLUTION EPIDURAL; INFILTRATION; INTRACAUDAL; PERINEURAL
Status: DISCONTINUED | OUTPATIENT
Start: 2022-07-28 | End: 2022-07-28

## 2022-07-28 RX ORDER — ONDANSETRON 2 MG/ML
4 INJECTION INTRAMUSCULAR; INTRAVENOUS DAILY PRN
Status: DISCONTINUED | OUTPATIENT
Start: 2022-07-28 | End: 2022-07-28 | Stop reason: HOSPADM

## 2022-07-28 RX ORDER — AMLODIPINE BESYLATE 10 MG/1
10 TABLET ORAL DAILY
COMMUNITY

## 2022-07-28 RX ORDER — METOPROLOL TARTRATE 1 MG/ML
5 INJECTION, SOLUTION INTRAVENOUS EVERY 6 HOURS
Status: DISCONTINUED | OUTPATIENT
Start: 2022-07-29 | End: 2022-08-01

## 2022-07-28 RX ORDER — ONDANSETRON 2 MG/ML
4 INJECTION INTRAMUSCULAR; INTRAVENOUS EVERY 6 HOURS PRN
Status: DISCONTINUED | OUTPATIENT
Start: 2022-07-28 | End: 2022-08-05 | Stop reason: HOSPADM

## 2022-07-28 RX ORDER — PHENYLEPHRINE HYDROCHLORIDE 10 MG/ML
INJECTION INTRAVENOUS
Status: DISCONTINUED | OUTPATIENT
Start: 2022-07-28 | End: 2022-07-28

## 2022-07-28 RX ORDER — LISINOPRIL 10 MG/1
10 TABLET ORAL DAILY
COMMUNITY

## 2022-07-28 RX ORDER — NALOXONE HCL 0.4 MG/ML
0.02 VIAL (ML) INJECTION
Status: DISCONTINUED | OUTPATIENT
Start: 2022-07-28 | End: 2022-08-05 | Stop reason: HOSPADM

## 2022-07-28 RX ORDER — HYDROMORPHONE HCL IN 0.9% NACL 6 MG/30 ML
PATIENT CONTROLLED ANALGESIA SYRINGE INTRAVENOUS CONTINUOUS
Status: DISCONTINUED | OUTPATIENT
Start: 2022-07-28 | End: 2022-08-01

## 2022-07-28 RX ORDER — IBUPROFEN 200 MG
24 TABLET ORAL
Status: DISCONTINUED | OUTPATIENT
Start: 2022-07-28 | End: 2022-08-04

## 2022-07-28 RX ORDER — ENOXAPARIN SODIUM 100 MG/ML
40 INJECTION SUBCUTANEOUS EVERY 24 HOURS
Status: DISCONTINUED | OUTPATIENT
Start: 2022-07-28 | End: 2022-08-03

## 2022-07-28 RX ORDER — AMLODIPINE BESYLATE 10 MG/1
10 TABLET ORAL DAILY
Status: DISCONTINUED | OUTPATIENT
Start: 2022-07-29 | End: 2022-07-28

## 2022-07-28 RX ORDER — CARVEDILOL 25 MG/1
25 TABLET ORAL 2 TIMES DAILY WITH MEALS
Status: DISCONTINUED | OUTPATIENT
Start: 2022-07-28 | End: 2022-07-28

## 2022-07-28 RX ORDER — ONDANSETRON 2 MG/ML
INJECTION INTRAMUSCULAR; INTRAVENOUS
Status: DISCONTINUED | OUTPATIENT
Start: 2022-07-28 | End: 2022-07-28

## 2022-07-28 RX ORDER — PROPOFOL 10 MG/ML
VIAL (ML) INTRAVENOUS
Status: DISCONTINUED | OUTPATIENT
Start: 2022-07-28 | End: 2022-07-28

## 2022-07-28 RX ORDER — METRONIDAZOLE 500 MG/100ML
500 INJECTION, SOLUTION INTRAVENOUS
Status: COMPLETED | OUTPATIENT
Start: 2022-07-28 | End: 2022-07-28

## 2022-07-28 RX ORDER — SODIUM CHLORIDE 9 MG/ML
INJECTION, SOLUTION INTRAVENOUS CONTINUOUS
Status: DISCONTINUED | OUTPATIENT
Start: 2022-07-28 | End: 2022-07-28

## 2022-07-28 RX ORDER — HYDRALAZINE HYDROCHLORIDE 20 MG/ML
10 INJECTION INTRAMUSCULAR; INTRAVENOUS EVERY 4 HOURS PRN
Status: DISCONTINUED | OUTPATIENT
Start: 2022-07-28 | End: 2022-08-05 | Stop reason: HOSPADM

## 2022-07-28 RX ORDER — IBUPROFEN 200 MG
16 TABLET ORAL
Status: DISCONTINUED | OUTPATIENT
Start: 2022-07-28 | End: 2022-08-04

## 2022-07-28 RX ORDER — INSULIN DEGLUDEC 100 U/ML
30 INJECTION, SOLUTION SUBCUTANEOUS DAILY
COMMUNITY

## 2022-07-28 RX ORDER — ONDANSETRON 4 MG/1
8 TABLET, ORALLY DISINTEGRATING ORAL EVERY 8 HOURS PRN
Status: DISCONTINUED | OUTPATIENT
Start: 2022-07-28 | End: 2022-08-05 | Stop reason: HOSPADM

## 2022-07-28 RX ORDER — MEPERIDINE HYDROCHLORIDE 25 MG/ML
25 INJECTION INTRAMUSCULAR; INTRAVENOUS; SUBCUTANEOUS EVERY 10 MIN PRN
Status: DISCONTINUED | OUTPATIENT
Start: 2022-07-28 | End: 2022-07-28 | Stop reason: HOSPADM

## 2022-07-28 RX ORDER — PROCHLORPERAZINE EDISYLATE 5 MG/ML
5 INJECTION INTRAMUSCULAR; INTRAVENOUS EVERY 6 HOURS PRN
Status: DISCONTINUED | OUTPATIENT
Start: 2022-07-28 | End: 2022-08-05 | Stop reason: HOSPADM

## 2022-07-28 RX ORDER — DIPHENHYDRAMINE HYDROCHLORIDE 50 MG/ML
25 INJECTION INTRAMUSCULAR; INTRAVENOUS EVERY 6 HOURS PRN
Status: DISCONTINUED | OUTPATIENT
Start: 2022-07-28 | End: 2022-07-28 | Stop reason: HOSPADM

## 2022-07-28 RX ORDER — MIDAZOLAM HYDROCHLORIDE 1 MG/ML
INJECTION INTRAMUSCULAR; INTRAVENOUS
Status: DISCONTINUED | OUTPATIENT
Start: 2022-07-28 | End: 2022-07-28

## 2022-07-28 RX ORDER — OXYCODONE HYDROCHLORIDE 5 MG/1
5 TABLET ORAL
Status: DISCONTINUED | OUTPATIENT
Start: 2022-07-28 | End: 2022-07-28 | Stop reason: HOSPADM

## 2022-07-28 RX ORDER — HYDROMORPHONE HYDROCHLORIDE 2 MG/ML
0.5 INJECTION, SOLUTION INTRAMUSCULAR; INTRAVENOUS; SUBCUTANEOUS EVERY 5 MIN PRN
Status: DISCONTINUED | OUTPATIENT
Start: 2022-07-28 | End: 2022-07-28 | Stop reason: HOSPADM

## 2022-07-28 RX ORDER — TALC
6 POWDER (GRAM) TOPICAL NIGHTLY PRN
Status: DISCONTINUED | OUTPATIENT
Start: 2022-07-28 | End: 2022-08-05 | Stop reason: HOSPADM

## 2022-07-28 RX ORDER — CARVEDILOL 25 MG/1
25 TABLET ORAL 2 TIMES DAILY WITH MEALS
COMMUNITY

## 2022-07-28 RX ORDER — METFORMIN HYDROCHLORIDE 500 MG/1
500 TABLET ORAL 2 TIMES DAILY WITH MEALS
COMMUNITY

## 2022-07-28 RX ORDER — ROCURONIUM BROMIDE 10 MG/ML
INJECTION, SOLUTION INTRAVENOUS
Status: DISCONTINUED | OUTPATIENT
Start: 2022-07-28 | End: 2022-07-28

## 2022-07-28 RX ORDER — SODIUM CHLORIDE, SODIUM LACTATE, POTASSIUM CHLORIDE, CALCIUM CHLORIDE 600; 310; 30; 20 MG/100ML; MG/100ML; MG/100ML; MG/100ML
125 INJECTION, SOLUTION INTRAVENOUS CONTINUOUS
Status: DISCONTINUED | OUTPATIENT
Start: 2022-07-28 | End: 2022-07-30

## 2022-07-28 RX ADMIN — SODIUM CHLORIDE: 9 INJECTION, SOLUTION INTRAVENOUS at 11:07

## 2022-07-28 RX ADMIN — SUGAMMADEX 200 MG: 100 INJECTION, SOLUTION INTRAVENOUS at 04:07

## 2022-07-28 RX ADMIN — LIDOCAINE HYDROCHLORIDE 50 MG: 20 INJECTION, SOLUTION EPIDURAL; INFILTRATION; INTRACAUDAL; PERINEURAL at 11:07

## 2022-07-28 RX ADMIN — Medication: at 06:07

## 2022-07-28 RX ADMIN — HYDROMORPHONE HYDROCHLORIDE 0.5 MG: 2 INJECTION INTRAMUSCULAR; INTRAVENOUS; SUBCUTANEOUS at 04:07

## 2022-07-28 RX ADMIN — ROCURONIUM BROMIDE 50 MG: 10 INJECTION, SOLUTION INTRAVENOUS at 11:07

## 2022-07-28 RX ADMIN — FENTANYL CITRATE 100 MCG: 50 INJECTION INTRAMUSCULAR; INTRAVENOUS at 11:07

## 2022-07-28 RX ADMIN — ONDANSETRON 4 MG: 2 INJECTION INTRAMUSCULAR; INTRAVENOUS at 06:07

## 2022-07-28 RX ADMIN — MIDAZOLAM 2 MG: 1 INJECTION INTRAMUSCULAR; INTRAVENOUS at 11:07

## 2022-07-28 RX ADMIN — INSULIN ASPART 2 UNITS: 100 INJECTION, SOLUTION INTRAVENOUS; SUBCUTANEOUS at 09:07

## 2022-07-28 RX ADMIN — CEFTRIAXONE SODIUM 2 G: 2 INJECTION, POWDER, FOR SOLUTION INTRAMUSCULAR; INTRAVENOUS at 11:07

## 2022-07-28 RX ADMIN — ROCURONIUM BROMIDE 20 MG: 10 INJECTION, SOLUTION INTRAVENOUS at 12:07

## 2022-07-28 RX ADMIN — ONDANSETRON 4 MG: 2 INJECTION INTRAMUSCULAR; INTRAVENOUS at 11:07

## 2022-07-28 RX ADMIN — ENOXAPARIN SODIUM 40 MG: 40 INJECTION SUBCUTANEOUS at 06:07

## 2022-07-28 RX ADMIN — METRONIDAZOLE 500 MG: 500 INJECTION, SOLUTION INTRAVENOUS at 11:07

## 2022-07-28 RX ADMIN — SODIUM CHLORIDE, POTASSIUM CHLORIDE, SODIUM LACTATE AND CALCIUM CHLORIDE: 600; 310; 30; 20 INJECTION, SOLUTION INTRAVENOUS at 11:07

## 2022-07-28 RX ADMIN — ROCURONIUM BROMIDE 30 MG: 10 INJECTION, SOLUTION INTRAVENOUS at 01:07

## 2022-07-28 RX ADMIN — PROPOFOL 200 MG: 10 INJECTION, EMULSION INTRAVENOUS at 11:07

## 2022-07-28 RX ADMIN — PHENYLEPHRINE HYDROCHLORIDE 100 MCG: 10 INJECTION INTRAVENOUS at 12:07

## 2022-07-28 RX ADMIN — ROCURONIUM BROMIDE 20 MG: 10 INJECTION, SOLUTION INTRAVENOUS at 02:07

## 2022-07-28 RX ADMIN — INSULIN DETEMIR 15 UNITS: 100 INJECTION, SOLUTION SUBCUTANEOUS at 09:07

## 2022-07-28 RX ADMIN — SODIUM CHLORIDE: 9 INJECTION, SOLUTION INTRAVENOUS at 02:07

## 2022-07-28 NOTE — ANESTHESIA POSTPROCEDURE EVALUATION
Anesthesia Post Evaluation    Patient: Chaim Johnson    Procedure(s) Performed: Procedure(s) (LRB):  COLONOSCOPY (N/A)  CLOSURE, COLOSTOMY (N/A)  APPLICATION, WOUND VAC (N/A)  LYSIS, ADHESIONS (N/A)  REPAIR, HERNIA, PARASTOMAL (N/A)  APPENDECTOMY (N/A)    Final Anesthesia Type: general      Patient location during evaluation: PACU  Patient participation: Yes- Able to Participate  Level of consciousness: awake and sedated  Post-procedure vital signs: reviewed and stable  Pain management: adequate  Airway patency: patent    PONV status at discharge: No PONV  Anesthetic complications: no      Cardiovascular status: blood pressure returned to baseline  Respiratory status: unassisted  Hydration status: euvolemic  Follow-up not needed.          Vitals Value Taken Time   /99 07/28/22 1706   Temp 36.4 °C (97.6 °F) 07/28/22 1630   Pulse 81 07/28/22 1709   Resp 13 07/28/22 1708   SpO2 95 % 07/28/22 1709   Vitals shown include unvalidated device data.      No case tracking events are documented in the log.      Pain/Veronica Score: Pain Rating Prior to Med Admin: 10 (7/28/2022  4:50 PM)  Veronica Score: 8 (7/28/2022  4:30 PM)

## 2022-07-28 NOTE — INTERVAL H&P NOTE
The patient has been examined and the H&P has been reviewed:    I concur with the findings and changes have been noted since the H&P was written:  The patient was unable to have his colonoscopy done yesterday due to vehicle trouble.  Patient did undergo his prep for the colonoscopy.  We will go ahead and perform a colonoscopy today; if no significant findings or complications, we will then proceed with the robot assisted colostomy reversal and wound VAC placement.    Anesthesia/Surgery risks, benefits and alternative options discussed and understood by patient/family.          There are no hospital problems to display for this patient.

## 2022-07-28 NOTE — OP NOTE
[facility] - Periop Services  Surgery Department  Operative Note    SUMMARY     Date of Procedure: 7/28/2022     Procedure: Procedure(s) (LRB):  COLONOSCOPY (N/A)  CLOSURE, COLOSTOMY (N/A)  APPLICATION, WOUND VAC (N/A)  LYSIS, ADHESIONS (N/A)  REPAIR, HERNIA, PARASTOMAL (N/A)  APPENDECTOMY (N/A)     Surgeon(s) and Role:     * Tripp Palmer, DO - Primary    Assisting Surgeon: None    Pre-Operative Diagnosis: S/P colostomy [Z93.3]    Post-Operative Diagnosis: Post-Op Diagnosis Codes:     * S/P colostomy [Z93.3]    Anesthesia: General    Operative Findings (including complications, if any):  Significant small bowel adhesions to abdominal wall; large peristomal hernia containing small bowel    Description of Technical Procedures:  Patient was taken the operating room and placed on the operating table in the lithotomy position.  Patient underwent general anesthesia per the anesthesia team.  We then inserted the colonoscope into the rectum and evaluated the rectal vault; this was intact and no strictures found; this past for approximately 30 cm.  We then withdrew the colonoscope and inserted into the left colostomy and advanced it up the descending colon, around the transverse and splenic flexure and then down the ascending colon; there was a poor prep, but no obvious mass seen in the colon; evaluated all the way down to the cecum and then withdrew and decompressed the colon.    We elected to proceed with surgery.  We used a 0 Vicryl suture to close the colostomy stoma in running fashion.  We prepped and draped the abdomen and rectum in usual sterile fashion.  We made a stab incision in the right upper quadrant and a Veress needle was inserted and the abdomen was insufflated to 15 mmHg; patient tolerated insufflation well.  We placed a 5 mm trocar in the subxiphoid region and entered the abdomen under visualization with the laparoscoped; no injuries identified.  We placed an additional 8 mm trocar in the right  upper quadrant to help with visualization.  We found significant small bowel adhesions across the abdomen adhered to the abdominal wall.  The colostomy was then placed and there was a large peristomal hernia containing small bowel.  Due to the significant amount of adhesions and scar tissue in the size of the hernia, we elected to perform the surgery open.    We made an incision inferior to the umbilicus and dissected down through subcutaneous tissue and through the fascia and entered the abdomen.  We then slowly extended the incision superiorly.  We then performed a significant lysis of adhesions for approximately 60 minutes to free off all the adhesions from the small bowel.  I will add a 22 modifier to the case.  We then dissected off the small bowel from the peristomal hernia and reduced the small bowel.  We freed up all the small bowel from adhesions from the ligament of Treitz all the way to the ileocecal valve.  Due to the significant adhesions from the patient's previous surgery, we elected to perform an appendectomy at this time.  We placed multiple 0 silk sutures around the base of appendix and ligated the appendix and then used the handheld LigaSure to transect the into the appendix.  The appendix was sent to pathology.  We then used cautery to transect around the prior ostomy and dissected down all way down to the fascia.  We then freed the descending colon from the fascia as well as took down the hernia sac.  We took down some the white line of Toldt on the left side and freed up plenty of descending colon.  We found Prolene sutures on the previously placed rectal stump we had good length the perform a staple anastomosis.  At this time we passed a series of dilators through the rectum.  We then opened and transected the stoma in stump of the prior colostomy and sent this to pathology.  Bleeding was controlled electrocautery.  We placed an anvil in the descending colon and placed a 2-0 Prolene  pursestring suture around this to secure the anvil.  We then introduced the EEA stapler through the rectum and we performed an end-to-side stapled anastomosis.  Water leak test was then performed which was negative for any air leak.  We removed the 2 tissue donuts from the stapler and we had 2 rings that were intact; this was sent to pathology with the specimen.  We placed a 10 Kinyarwanda ABELINO drain around the anastomosis and came out the right abdomen and was secured to the skin with 2-0 silk suture.  We then closed the peristomal hernia defect with multiple #1 figure-of-eight PDS.  We irrigated the abdomen with Irrisept solution and suctioned till clear.  We then closed the abdominal fascia with #1 loop PDS in running fashion.  Skin incisions were closed with staples.  We placed a huyen dressing over the midline incision we placed a black sponge over the ostomy site and placed all this to negative pressure dressing which had adequate seal.  The patient was awakened, extubated and taken the PACU stable condition.  Patient tolerated procedure well.  NG tube and Saini catheter left in place.        Estimated Blood Loss (EBL): 25 mL           Implants: * No implants in log *    Specimens:   Specimen (24h ago, onward)             Start     Ordered    07/28/22 1433  Surgical Pathology  RELEASE UPON ORDERING         07/28/22 1433                        Condition: Good    Disposition: PACU - hemodynamically stable.    Attestation: I was present and scrubbed for the entire procedure.

## 2022-07-28 NOTE — TRANSFER OF CARE
"Anesthesia Transfer of Care Note    Patient: Chaim Johnson    Procedure(s) Performed: Procedure(s) (LRB):  COLONOSCOPY (N/A)  CLOSURE, COLOSTOMY (N/A)  APPLICATION, WOUND VAC (N/A)  LYSIS, ADHESIONS (N/A)  REPAIR, HERNIA, PARASTOMAL (N/A)  APPENDECTOMY (N/A)    Patient location: PACU    Anesthesia Type: general    Transport from OR: Transported from OR on 6-10 L/min O2 by face mask with adequate spontaneous ventilation    Post pain: adequate analgesia    Post assessment: no apparent anesthetic complications    Post vital signs: stable    Level of consciousness: responds to stimulation and awake    Nausea/Vomiting: no nausea/vomiting    Complications: none    Transfer of care protocol was followedComments: Good SV continue, NAD noted, VSS, RTRN      Last vitals:   Visit Vitals  BP (!) 142/93 (BP Location: Left arm, Patient Position: Lying)   Pulse 76   Temp 36.4 °C (97.6 °F) (Oral)   Resp (!) 24   Ht 6' 1" (1.854 m)   Wt 121.1 kg (267 lb)   SpO2 99%   BMI 35.23 kg/m²     "

## 2022-07-28 NOTE — OR NURSING
1630 Pt arrived from OR. VSS. SX sites observed. Abd binder in place.     1710 Pt VSS. Transported to 460.   1725 Handoff given to Adam Elizalde RN.  HR 84 /96 Temp 97.5 O2 96. No family to contact.

## 2022-07-28 NOTE — ANESTHESIA PROCEDURE NOTES
Intubation    Date/Time: 7/28/2022 11:38 AM  Performed by: Santos Mackey CRNA  Authorized by: Clarke Najera MD     Intubation:     Induction:  Intravenous    Intubated:  Postinduction    Mask Ventilation:  Easy mask    Attempts:  1    Attempted By:  CRNA    Method of Intubation:  Direct    Blade:  Jhonatan 4    Laryngeal View Grade: Grade I - full view of cords      Difficult Airway Encountered?: No      Complications:  None    Airway Device:  Oral endotracheal tube    Airway Device Size:  7.5    Style/Cuff Inflation:  Cuffed (inflated to minimal occlusive pressure)    Tube secured:  22    Secured at:  The lips    Placement Verified By:  Capnometry and Revisualization with laryngoscopy    Complicating Factors:  None    Findings Post-Intubation:  BS equal bilateral and atraumatic/condition of teeth unchanged

## 2022-07-28 NOTE — ANESTHESIA PREPROCEDURE EVALUATION
07/28/2022  Chaim Johnson is a 46 y.o., male.      Pre-op Assessment    I have reviewed the Patient Summary Reports.     I have reviewed the Nursing Notes. I have reviewed the NPO Status.   I have reviewed the Medications.     Review of Systems  Anesthesia Hx:  No problems with previous Anesthesia    Social:  No Alcohol Use, Smoker    Hematology/Oncology:  Hematology Normal   Oncology Normal     EENT/Dental:EENT/Dental Normal   Cardiovascular:   Hypertension    Pulmonary:   Sleep Apnea    Renal/:  Renal/ Normal     Hepatic/GI:  Hepatic/GI Normal    Musculoskeletal:  Musculoskeletal Normal    Neurological:  Neurology Normal    Endocrine:   Diabetes, poorly controlled, type 2  Morbid Obesity / BMI > 40  Dermatological:  Skin Normal    Psych:  Psychiatric Normal           Physical Exam  General: Well nourished    Airway:  Mallampati: III / III  Mouth Opening: Normal  TM Distance: > 6 cm  Tongue: Normal  Neck ROM: Normal ROM    Chest/Lungs:  Clear to auscultation, Normal Respiratory Rate    Heart:  Rate: Normal  Rhythm: Regular Rhythm        Anesthesia Plan  Type of Anesthesia, risks & benefits discussed:    Anesthesia Type: Gen ETT  Intra-op Monitoring Plan: Standard ASA Monitors  Post Op Pain Control Plan: multimodal analgesia  Induction:  IV  Informed Consent: Informed consent signed with the Patient and all parties understand the risks and agree with anesthesia plan.  All questions answered. Patient consented to blood products? Yes  ASA Score: 3  Day of Surgery Review of History & Physical: H&P Update referred to the surgeon/provider.I have interviewed and examined the patient. I have reviewed the patient's H&P dated: There are no significant changes. H&P completed by Anesthesiologist.    Ready For Surgery From Anesthesia Perspective.     .

## 2022-07-29 PROBLEM — L08.9 DIABETIC FOOT INFECTION: Status: ACTIVE | Noted: 2022-07-29

## 2022-07-29 PROBLEM — E11.628 DIABETIC FOOT INFECTION: Status: ACTIVE | Noted: 2022-07-29

## 2022-07-29 PROBLEM — K43.5 PARASTOMAL HERNIA: Status: ACTIVE | Noted: 2022-07-29

## 2022-07-29 LAB
ANION GAP SERPL CALCULATED.3IONS-SCNC: 11 MMOL/L (ref 7–16)
BASOPHILS # BLD AUTO: 0.02 K/UL (ref 0–0.2)
BASOPHILS NFR BLD AUTO: 0.1 % (ref 0–1)
BUN SERPL-MCNC: 13 MG/DL (ref 7–18)
BUN/CREAT SERPL: 14 (ref 6–20)
CALCIUM SERPL-MCNC: 8.8 MG/DL (ref 8.5–10.1)
CHLORIDE SERPL-SCNC: 111 MMOL/L (ref 98–107)
CO2 SERPL-SCNC: 23 MMOL/L (ref 21–32)
CREAT SERPL-MCNC: 0.96 MG/DL (ref 0.7–1.3)
DIFFERENTIAL METHOD BLD: ABNORMAL
EOSINOPHIL # BLD AUTO: 0.01 K/UL (ref 0–0.5)
EOSINOPHIL NFR BLD AUTO: 0.1 % (ref 1–4)
ERYTHROCYTE [DISTWIDTH] IN BLOOD BY AUTOMATED COUNT: 14 % (ref 11.5–14.5)
GLUCOSE SERPL-MCNC: 141 MG/DL (ref 70–105)
GLUCOSE SERPL-MCNC: 146 MG/DL (ref 70–105)
GLUCOSE SERPL-MCNC: 155 MG/DL (ref 74–106)
HCT VFR BLD AUTO: 38.4 % (ref 40–54)
HGB BLD-MCNC: 12.5 G/DL (ref 13.5–18)
IMM GRANULOCYTES # BLD AUTO: 0.05 K/UL (ref 0–0.04)
IMM GRANULOCYTES NFR BLD: 0.4 % (ref 0–0.4)
LYMPHOCYTES # BLD AUTO: 1.22 K/UL (ref 1–4.8)
LYMPHOCYTES NFR BLD AUTO: 8.9 % (ref 27–41)
MAGNESIUM SERPL-MCNC: 1.8 MG/DL (ref 1.7–2.3)
MCH RBC QN AUTO: 29.8 PG (ref 27–31)
MCHC RBC AUTO-ENTMCNC: 32.6 G/DL (ref 32–36)
MCV RBC AUTO: 91.6 FL (ref 80–96)
MONOCYTES # BLD AUTO: 0.77 K/UL (ref 0–0.8)
MONOCYTES NFR BLD AUTO: 5.6 % (ref 2–6)
MPC BLD CALC-MCNC: 10.2 FL (ref 9.4–12.4)
NEUTROPHILS # BLD AUTO: 11.7 K/UL (ref 1.8–7.7)
NEUTROPHILS NFR BLD AUTO: 84.9 % (ref 53–65)
NRBC # BLD AUTO: 0 X10E3/UL
NRBC, AUTO (.00): 0 %
PHOSPHATE SERPL-MCNC: 4 MG/DL (ref 2.5–4.5)
PLATELET # BLD AUTO: 380 K/UL (ref 150–400)
POTASSIUM SERPL-SCNC: 4.4 MMOL/L (ref 3.5–5.1)
RBC # BLD AUTO: 4.19 M/UL (ref 4.6–6.2)
SODIUM SERPL-SCNC: 141 MMOL/L (ref 136–145)
WBC # BLD AUTO: 13.77 K/UL (ref 4.5–11)

## 2022-07-29 PROCEDURE — 84100 ASSAY OF PHOSPHORUS: CPT | Performed by: STUDENT IN AN ORGANIZED HEALTH CARE EDUCATION/TRAINING PROGRAM

## 2022-07-29 PROCEDURE — 99232 PR SUBSEQUENT HOSPITAL CARE,LEVL II: ICD-10-PCS | Mod: ,,, | Performed by: FAMILY MEDICINE

## 2022-07-29 PROCEDURE — 85025 COMPLETE CBC W/AUTO DIFF WBC: CPT | Performed by: STUDENT IN AN ORGANIZED HEALTH CARE EDUCATION/TRAINING PROGRAM

## 2022-07-29 PROCEDURE — 63600175 PHARM REV CODE 636 W HCPCS: Performed by: ANESTHESIOLOGY

## 2022-07-29 PROCEDURE — 63600175 PHARM REV CODE 636 W HCPCS: Performed by: NURSE PRACTITIONER

## 2022-07-29 PROCEDURE — 83735 ASSAY OF MAGNESIUM: CPT | Performed by: STUDENT IN AN ORGANIZED HEALTH CARE EDUCATION/TRAINING PROGRAM

## 2022-07-29 PROCEDURE — 11000001 HC ACUTE MED/SURG PRIVATE ROOM

## 2022-07-29 PROCEDURE — C9113 INJ PANTOPRAZOLE SODIUM, VIA: HCPCS | Performed by: NURSE PRACTITIONER

## 2022-07-29 PROCEDURE — 99232 SBSQ HOSP IP/OBS MODERATE 35: CPT | Mod: ,,, | Performed by: FAMILY MEDICINE

## 2022-07-29 PROCEDURE — 80048 BASIC METABOLIC PNL TOTAL CA: CPT | Performed by: STUDENT IN AN ORGANIZED HEALTH CARE EDUCATION/TRAINING PROGRAM

## 2022-07-29 PROCEDURE — 25000003 PHARM REV CODE 250: Performed by: NURSE PRACTITIONER

## 2022-07-29 PROCEDURE — 51798 US URINE CAPACITY MEASURE: CPT

## 2022-07-29 PROCEDURE — 94761 N-INVAS EAR/PLS OXIMETRY MLT: CPT

## 2022-07-29 PROCEDURE — 99232 PR SUBSEQUENT HOSPITAL CARE,LEVL II: ICD-10-PCS | Mod: ,,, | Performed by: NURSE PRACTITIONER

## 2022-07-29 PROCEDURE — 63600175 PHARM REV CODE 636 W HCPCS: Performed by: STUDENT IN AN ORGANIZED HEALTH CARE EDUCATION/TRAINING PROGRAM

## 2022-07-29 PROCEDURE — 25000003 PHARM REV CODE 250: Performed by: HOSPITALIST

## 2022-07-29 PROCEDURE — 97162 PT EVAL MOD COMPLEX 30 MIN: CPT

## 2022-07-29 PROCEDURE — C9399 UNCLASSIFIED DRUGS OR BIOLOG: HCPCS | Performed by: HOSPITALIST

## 2022-07-29 PROCEDURE — 63600175 PHARM REV CODE 636 W HCPCS: Performed by: HOSPITALIST

## 2022-07-29 PROCEDURE — 36415 COLL VENOUS BLD VENIPUNCTURE: CPT | Performed by: STUDENT IN AN ORGANIZED HEALTH CARE EDUCATION/TRAINING PROGRAM

## 2022-07-29 PROCEDURE — 82962 GLUCOSE BLOOD TEST: CPT

## 2022-07-29 PROCEDURE — 99232 SBSQ HOSP IP/OBS MODERATE 35: CPT | Mod: ,,, | Performed by: NURSE PRACTITIONER

## 2022-07-29 RX ORDER — SODIUM CHLORIDE, SODIUM LACTATE, POTASSIUM CHLORIDE, CALCIUM CHLORIDE 600; 310; 30; 20 MG/100ML; MG/100ML; MG/100ML; MG/100ML
INJECTION, SOLUTION INTRAVENOUS CONTINUOUS
Status: DISCONTINUED | OUTPATIENT
Start: 2022-07-29 | End: 2022-08-05 | Stop reason: HOSPADM

## 2022-07-29 RX ORDER — PANTOPRAZOLE SODIUM 40 MG/10ML
40 INJECTION, POWDER, LYOPHILIZED, FOR SOLUTION INTRAVENOUS DAILY
Status: DISCONTINUED | OUTPATIENT
Start: 2022-07-29 | End: 2022-08-05 | Stop reason: HOSPADM

## 2022-07-29 RX ADMIN — INSULIN DETEMIR 15 UNITS: 100 INJECTION, SOLUTION SUBCUTANEOUS at 09:07

## 2022-07-29 RX ADMIN — METOPROLOL TARTRATE 5 MG: 5 INJECTION, SOLUTION INTRAVENOUS at 12:07

## 2022-07-29 RX ADMIN — VANCOMYCIN HYDROCHLORIDE 2500 MG: 5 INJECTION, POWDER, LYOPHILIZED, FOR SOLUTION INTRAVENOUS at 03:07

## 2022-07-29 RX ADMIN — Medication: at 10:07

## 2022-07-29 RX ADMIN — PIPERACILLIN SODIUM AND TAZOBACTAM SODIUM 4.5 G: 4; .5 INJECTION, POWDER, LYOPHILIZED, FOR SOLUTION INTRAVENOUS at 09:07

## 2022-07-29 RX ADMIN — PIPERACILLIN SODIUM AND TAZOBACTAM SODIUM 4.5 G: 4; .5 INJECTION, POWDER, LYOPHILIZED, FOR SOLUTION INTRAVENOUS at 01:07

## 2022-07-29 RX ADMIN — SODIUM CHLORIDE, POTASSIUM CHLORIDE, SODIUM LACTATE AND CALCIUM CHLORIDE: 600; 310; 30; 20 INJECTION, SOLUTION INTRAVENOUS at 01:07

## 2022-07-29 RX ADMIN — SODIUM CHLORIDE, POTASSIUM CHLORIDE, SODIUM LACTATE AND CALCIUM CHLORIDE 20 ML/HR: 600; 310; 30; 20 INJECTION, SOLUTION INTRAVENOUS at 11:07

## 2022-07-29 RX ADMIN — Medication: at 07:07

## 2022-07-29 RX ADMIN — METOPROLOL TARTRATE 5 MG: 5 INJECTION, SOLUTION INTRAVENOUS at 06:07

## 2022-07-29 RX ADMIN — METOPROLOL TARTRATE 5 MG: 5 INJECTION, SOLUTION INTRAVENOUS at 01:07

## 2022-07-29 RX ADMIN — PANTOPRAZOLE SODIUM 40 MG: 40 INJECTION, POWDER, FOR SOLUTION INTRAVENOUS at 01:07

## 2022-07-29 RX ADMIN — ENOXAPARIN SODIUM 40 MG: 40 INJECTION SUBCUTANEOUS at 05:07

## 2022-07-29 RX ADMIN — Medication: at 12:07

## 2022-07-29 RX ADMIN — METOPROLOL TARTRATE 5 MG: 5 INJECTION, SOLUTION INTRAVENOUS at 05:07

## 2022-07-29 NOTE — CONSULTS
[facility]  [facility] Medicine  Consult Note    Patient Name: Chaim Johnson  MRN: [id number]  Admission Date: 7/28/2022  Hospital Length of Stay: 0 days  Attending Physician: Tripp Palmer DO   Primary Care Provider: LANDON Evans           Patient information was obtained from patient, past medical records and ER records.     Consults  Subjective:     Principal Problem: <principal problem not specified>    Chief Complaint: No chief complaint on file.       HPI: 47 yo M underwent colostomy closure, MARIAM, appy and parasternal hernia repair today.  He is c/o pain.  He is about to have NGT placed to LIS.  He has ALEX but does not want a CPAP at this time q hs due to NGT and due to risk of N/V and aspiration this is probably a good idea.  Patient has had necrotizing fascitis of left thigh and has transmetarsal amputation of left foot with diabetic foot ulcer.  He follows in [place] for wound care with Rivas Stafford MD with [facility].  He is on full dose anticoagulation with eliquis which is being held at this time.  He does not know why he is on anticoagulation and I can find no record in hospital chart.      Patient had SMITH when he had alcoholic pancreatitis last year and follows with Dr. Flores and creat is now 1.3 and was previously 4.3.  He has been able to restart his glucophage and lisinopril.  Dr. Palmer is holding ACEI and CCB but continuing BB post op and monitoring BP closely.        Past Medical History:   Diagnosis Date    Diabetes mellitus     Hypertension     Sleep apnea     Toe ulcer 12/6/2021       Past Surgical History:   Procedure Laterality Date    INCISION AND DRAINAGE OF ABSCESS Left 11/12/2021    Procedure: INCISION AND DRAINAGE, Left buttock and groin ABSCESS;  Surgeon: Tripp Palmer DO;  Location: [facility] OR;  Service: General;  Laterality: Left;  PACKING PLACED    INSERTION OF DIALYSIS CATHETER N/A 11/18/2021    Procedure: INSERTION,  CATHETER, DIALYSIS;  Surgeon: Tripp Palmer DO;  Location: [facility] OR;  Service: General;  Laterality: N/A;    REVISION COLOSTOMY N/A 11/24/2021    Procedure: ATTEMPTED REVISION COLOSTOMY;  Surgeon: Dov Almaguer MD;  Location: [facility] OR;  Service: General;  Laterality: N/A;    TOE AMPUTATION Right 11/12/2021    Procedure: AMPUTATION RIGHT 2ND TOE WITH WOUND DEBRIDEMENT @RIGHT GREAT TOE AMPUTATION SITE;  Surgeon: Tripp Palmer DO;  Location: [facility] OR;  Service: General;  Laterality: Right;    WOUND DEBRIDEMENT Left 11/14/2021    Procedure: DEBRIDEMENT, Buttock WOUND;  Surgeon: Tripp Palmer DO;  Location: [facility] OR;  Service: General;  Laterality: Left;    WOUND DEBRIDEMENT Left 11/18/2021    Procedure: DEBRIDEMENT, WOUND on LEFT BUTTOCK;  Surgeon: Tripp Palmer DO;  Location: [facility] OR;  Service: General;  Laterality: Left;    WOUND DEBRIDEMENT Left 11/22/2021    Procedure: DEBRIDEMENT, WOUND;  Surgeon: Dov Almaguer MD;  Location: [facility];  Service: General;  Laterality: Left;  left groin, perineum, buttocks,upper thigh       Review of patient's allergies indicates:  No Known Allergies    No current facility-administered medications on file prior to encounter.     Current Outpatient Medications on File Prior to Encounter   Medication Sig    amLODIPine (NORVASC) 10 MG tablet Take 10 mg by mouth once daily.    carvediloL (COREG) 25 MG tablet Take 25 mg by mouth 2 (two) times daily with meals.    gabapentin (NEURONTIN) 300 MG capsule Take 300 mg by mouth 2 (two) times daily.    insulin degludec (TRESIBA FLEXTOUCH U-100) 100 unit/mL (3 mL) insulin pen Inject 30 Units into the skin once daily.    lisinopriL 10 MG tablet Take 10 mg by mouth once daily.    [DISCONTINUED] multivitamin Tab Take 1 tablet by mouth once daily.    apixaban (ELIQUIS) 2.5 mg Tab Take 2.5 mg by mouth 2 (two) times daily.    metFORMIN (GLUCOPHAGE) 500 MG tablet Take 500 mg by mouth 2 (two) times daily  with meals.    [DISCONTINUED] aspirin (ECOTRIN) 81 MG EC tablet Take 81 mg by mouth once daily.    [DISCONTINUED] collagenase (SANTYL) ointment Apply topically once daily.    [DISCONTINUED] ergocalciferol (ERGOCALCIFEROL) 50,000 unit Cap Take 50,000 Units by mouth every 7 days.    [DISCONTINUED] ferrous sulfate 325 (65 FE) MG EC tablet Take 325 mg by mouth 3 (three) times daily with meals.    [DISCONTINUED] folic acid (FOLVITE) 1 MG tablet Take 1 tablet (1 mg total) by mouth once daily.    [DISCONTINUED] HYDROcodone-acetaminophen (NORCO) 5-325 mg per tablet Take 1 tablet by mouth every 8 (eight) hours as needed for Pain.    [DISCONTINUED] insulin NPH-insulin regular, 70/30, (NOVOLIN 70/30) 100 unit/mL (70-30) injection Inject 20 Units into the skin 2 (two) times daily. 15 units in the AM  15 units in the PM    Can go up and down on dose depending on the sugars.    [DISCONTINUED] losartan (COZAAR) 25 MG tablet Take 1 tablet (25 mg total) by mouth once daily. for 14 days    [DISCONTINUED] metoprolol tartrate (LOPRESSOR) 50 MG tablet Take 1 tablet (50 mg total) by mouth 2 (two) times daily. for 14 days    [DISCONTINUED] sodium bicarbonate 650 MG tablet Take 1 tablet (650 mg total) by mouth 3 (three) times daily. for 7 days     Family History    None       Tobacco Use    Smoking status: Not on file    Smokeless tobacco: Not on file   Substance and Sexual Activity    Alcohol use: Not on file    Drug use: Not on file    Sexual activity: Not on file     Review of Systems   Constitutional:  Negative for appetite change, chills, fatigue, fever and unexpected weight change.   HENT:  Negative for congestion, mouth sores, nosebleeds, sinus pain, sore throat and trouble swallowing.    Respiratory:  Negative for apnea, cough, chest tightness and shortness of breath.    Cardiovascular:  Negative for chest pain, palpitations and leg swelling.   Gastrointestinal:  Positive for abdominal pain. Negative for nausea.    Endocrine: Positive for polyuria.   Genitourinary:  Negative for decreased urine volume, difficulty urinating, dysuria and frequency.   Musculoskeletal:  Negative for arthralgias, back pain and neck pain.   Skin:  Negative for rash.   Neurological:  Negative for syncope, light-headedness and headaches.   Hematological:  Does not bruise/bleed easily.   Psychiatric/Behavioral:  Negative for confusion and suicidal ideas.    Objective:     Vital Signs (Most Recent):  Temp: 97.6 °F (36.4 °C) (07/28/22 1630)  Pulse: 81 (07/28/22 1710)  Resp: (!) 22 (07/28/22 1819)  BP: (!) 158/99 (07/28/22 1705)  SpO2: 95 % (07/28/22 1710)   Vital Signs (24h Range):  Temp:  [97.6 °F (36.4 °C)-98.1 °F (36.7 °C)] 97.6 °F (36.4 °C)  Pulse:  [74-93] 81  Resp:  [13-24] 22  SpO2:  [93 %-100 %] 95 %  BP: (117-159)/(72-99) 158/99     Weight: 121.1 kg (267 lb)  Body mass index is 35.23 kg/m².    Physical Exam  Vitals and nursing note reviewed. Exam conducted with a chaperone present.   Constitutional:       General: He is not in acute distress.     Appearance: Normal appearance. He is obese. He is ill-appearing.   HENT:      Head: Atraumatic.      Mouth/Throat:      Mouth: Mucous membranes are moist.      Pharynx: Oropharynx is clear.   Eyes:      Conjunctiva/sclera: Conjunctivae normal.      Pupils: Pupils are equal, round, and reactive to light.   Neck:      Vascular: No carotid bruit.   Cardiovascular:      Rate and Rhythm: Normal rate and regular rhythm.      Pulses: Normal pulses.      Heart sounds: Normal heart sounds.   Pulmonary:      Effort: Pulmonary effort is normal.      Breath sounds: Normal breath sounds.   Abdominal:      Tenderness: There is abdominal tenderness. There is no guarding.   Musculoskeletal:         General: Deformity present.      Cervical back: Neck supple.      Right lower leg: No edema.      Left lower leg: No edema.   Skin:     General: Skin is warm and dry.      Capillary Refill: Capillary refill takes less than  2 seconds.      Coloration: Skin is not jaundiced or pale.      Findings: No bruising, lesion or rash.   Neurological:      General: No focal deficit present.      Mental Status: He is alert and oriented to person, place, and time.   Psychiatric:         Mood and Affect: Mood normal.       Significant Labs: All pertinent labs within the past 24 hours have been reviewed.    Significant Imaging: I have reviewed all pertinent imaging results/findings within the past 24 hours.    Assessment/Plan:     Diabetes mellitus  Patient's FSGs are controlled on current medication regimen.  Last A1c reviewed- No results found for: LABA1C, HGBA1C  Most recent fingerstick glucose reviewed- No results for input(s): POCTGLUCOSE in the last 24 hours.  Current correctional scale  Medium  Maintain anti-hyperglycemic dose as follows-   Antihyperglycemics (From admission, onward)            Start     Stop Route Frequency Ordered    07/28/22 2100  insulin detemir U-100 injection 15 Units         -- SubQ Nightly 07/28/22 1818    07/28/22 1917  insulin aspart U-100 injection 1-10 Units         -- SubQ Before meals & nightly PRN 07/28/22 1818    07/28/22 1754  insulin aspart U-100 injection 1-10 Units         -- SubQ Before meals & nightly PRN 07/28/22 1754        Hold Oral hypoglycemics while patient is in the hospital.  Continue basal insulin with correctional bolus.    Restart nutritional insulin if appropriate when tolerating PO.      Benign essential HTN  Holding ACE and CCB at present.  Restart when BP tolerates  Continue BB but patient refusing oral meds due to N/V.    Scheduled lopressor and prn hydralazine.    Restart home meds when tolerated.        ALEX (obstructive sleep apnea)  Holding CPAP until NGT discontinued and no longer aspiration risk.       Aftercare following surgery  Defer to primary team.          VTE Risk Mitigation (From admission, onward)         Ordered     enoxaparin injection 40 mg  Daily         07/28/22 1754      IP VTE HIGH RISK PATIENT  Once         07/28/22 1754     Place sequential compression device  Until discontinued         07/28/22 1754     Place sequential compression device  Until discontinued         07/28/22 1754                    Thank you for your consult. I will follow-up with patient. Please contact us if you have any additional questions.  Dr. Franco to follow while in hospital.      Nazia Nunez MD  Department of Hospital Medicine   [facility] - Orthopedic

## 2022-07-29 NOTE — SUBJECTIVE & OBJECTIVE
Past Medical History:   Diagnosis Date    Diabetes mellitus     Hypertension     Sleep apnea     Toe ulcer 12/6/2021       Past Surgical History:   Procedure Laterality Date    INCISION AND DRAINAGE OF ABSCESS Left 11/12/2021    Procedure: INCISION AND DRAINAGE, Left buttock and groin ABSCESS;  Surgeon: Tripp Palmer DO;  Location: [facility] OR;  Service: General;  Laterality: Left;  PACKING PLACED    INSERTION OF DIALYSIS CATHETER N/A 11/18/2021    Procedure: INSERTION, CATHETER, DIALYSIS;  Surgeon: Tripp Palmer DO;  Location: [facility] OR;  Service: General;  Laterality: N/A;    REVISION COLOSTOMY N/A 11/24/2021    Procedure: ATTEMPTED REVISION COLOSTOMY;  Surgeon: Dov Almaguer MD;  Location: [facility] [place];  Service: General;  Laterality: N/A;    TOE AMPUTATION Right 11/12/2021    Procedure: AMPUTATION RIGHT 2ND TOE WITH WOUND DEBRIDEMENT @RIGHT GREAT TOE AMPUTATION SITE;  Surgeon: Tripp Palmer DO;  Location: [facility] [place];  Service: General;  Laterality: Right;    WOUND DEBRIDEMENT Left 11/14/2021    Procedure: DEBRIDEMENT, Buttock WOUND;  Surgeon: Tripp Palmer DO;  Location: [facility] OR;  Service: General;  Laterality: Left;    WOUND DEBRIDEMENT Left 11/18/2021    Procedure: DEBRIDEMENT, WOUND on LEFT BUTTOCK;  Surgeon: Tripp Palmer DO;  Location: [facility] [place];  Service: General;  Laterality: Left;    WOUND DEBRIDEMENT Left 11/22/2021    Procedure: DEBRIDEMENT, WOUND;  Surgeon: Dov Almaguer MD;  Location: [facility] [place];  Service: General;  Laterality: Left;  left groin, perineum, buttocks,upper thigh       Review of patient's allergies indicates:  No Known Allergies    No current facility-administered medications on file prior to encounter.     Current Outpatient Medications on File Prior to Encounter   Medication Sig    amLODIPine (NORVASC) 10 MG tablet Take 10 mg by mouth once daily.    carvediloL (COREG) 25 MG tablet Take 25 mg by mouth 2 (two) times daily with meals.    gabapentin  (NEURONTIN) 300 MG capsule Take 300 mg by mouth 2 (two) times daily.    insulin degludec (TRESIBA FLEXTOUCH U-100) 100 unit/mL (3 mL) insulin pen Inject 30 Units into the skin once daily.    lisinopriL 10 MG tablet Take 10 mg by mouth once daily.    [DISCONTINUED] multivitamin Tab Take 1 tablet by mouth once daily.    apixaban (ELIQUIS) 2.5 mg Tab Take 2.5 mg by mouth 2 (two) times daily.    metFORMIN (GLUCOPHAGE) 500 MG tablet Take 500 mg by mouth 2 (two) times daily with meals.    [DISCONTINUED] aspirin (ECOTRIN) 81 MG EC tablet Take 81 mg by mouth once daily.    [DISCONTINUED] collagenase (SANTYL) ointment Apply topically once daily.    [DISCONTINUED] ergocalciferol (ERGOCALCIFEROL) 50,000 unit Cap Take 50,000 Units by mouth every 7 days.    [DISCONTINUED] ferrous sulfate 325 (65 FE) MG EC tablet Take 325 mg by mouth 3 (three) times daily with meals.    [DISCONTINUED] folic acid (FOLVITE) 1 MG tablet Take 1 tablet (1 mg total) by mouth once daily.    [DISCONTINUED] HYDROcodone-acetaminophen (NORCO) 5-325 mg per tablet Take 1 tablet by mouth every 8 (eight) hours as needed for Pain.    [DISCONTINUED] insulin NPH-insulin regular, 70/30, (NOVOLIN 70/30) 100 unit/mL (70-30) injection Inject 20 Units into the skin 2 (two) times daily. 15 units in the AM  15 units in the PM    Can go up and down on dose depending on the sugars.    [DISCONTINUED] losartan (COZAAR) 25 MG tablet Take 1 tablet (25 mg total) by mouth once daily. for 14 days    [DISCONTINUED] metoprolol tartrate (LOPRESSOR) 50 MG tablet Take 1 tablet (50 mg total) by mouth 2 (two) times daily. for 14 days    [DISCONTINUED] sodium bicarbonate 650 MG tablet Take 1 tablet (650 mg total) by mouth 3 (three) times daily. for 7 days     Family History    None       Tobacco Use    Smoking status: Not on file    Smokeless tobacco: Not on file   Substance and Sexual Activity    Alcohol use: Not on file    Drug use: Not on file    Sexual activity: Not on file      Review of Systems   Constitutional:  Negative for appetite change, chills, fatigue, fever and unexpected weight change.   HENT:  Negative for congestion, mouth sores, nosebleeds, sinus pain, sore throat and trouble swallowing.    Respiratory:  Negative for apnea, cough, chest tightness and shortness of breath.    Cardiovascular:  Negative for chest pain, palpitations and leg swelling.   Gastrointestinal:  Positive for abdominal pain. Negative for nausea.   Endocrine: Positive for polyuria.   Genitourinary:  Negative for decreased urine volume, difficulty urinating, dysuria and frequency.   Musculoskeletal:  Negative for arthralgias, back pain and neck pain.   Skin:  Negative for rash.   Neurological:  Negative for syncope, light-headedness and headaches.   Hematological:  Does not bruise/bleed easily.   Psychiatric/Behavioral:  Negative for confusion and suicidal ideas.    Objective:     Vital Signs (Most Recent):  Temp: 97.6 °F (36.4 °C) (07/28/22 1630)  Pulse: 81 (07/28/22 1710)  Resp: (!) 22 (07/28/22 1819)  BP: (!) 158/99 (07/28/22 1705)  SpO2: 95 % (07/28/22 1710)   Vital Signs (24h Range):  Temp:  [97.6 °F (36.4 °C)-98.1 °F (36.7 °C)] 97.6 °F (36.4 °C)  Pulse:  [74-93] 81  Resp:  [13-24] 22  SpO2:  [93 %-100 %] 95 %  BP: (117-159)/(72-99) 158/99     Weight: 121.1 kg (267 lb)  Body mass index is 35.23 kg/m².    Physical Exam  Vitals and nursing note reviewed. Exam conducted with a chaperone present.   Constitutional:       General: He is not in acute distress.     Appearance: Normal appearance. He is obese. He is ill-appearing.   HENT:      Head: Atraumatic.      Mouth/Throat:      Mouth: Mucous membranes are moist.      Pharynx: Oropharynx is clear.   Eyes:      Conjunctiva/sclera: Conjunctivae normal.      Pupils: Pupils are equal, round, and reactive to light.   Neck:      Vascular: No carotid bruit.   Cardiovascular:      Rate and Rhythm: Normal rate and regular rhythm.      Pulses: Normal pulses.       Heart sounds: Normal heart sounds.   Pulmonary:      Effort: Pulmonary effort is normal.      Breath sounds: Normal breath sounds.   Abdominal:      Tenderness: There is abdominal tenderness. There is no guarding.   Musculoskeletal:         General: Deformity present.      Cervical back: Neck supple.      Right lower leg: No edema.      Left lower leg: No edema.   Skin:     General: Skin is warm and dry.      Capillary Refill: Capillary refill takes less than 2 seconds.      Coloration: Skin is not jaundiced or pale.      Findings: No bruising, lesion or rash.   Neurological:      General: No focal deficit present.      Mental Status: He is alert and oriented to person, place, and time.   Psychiatric:         Mood and Affect: Mood normal.       Significant Labs: All pertinent labs within the past 24 hours have been reviewed.    Significant Imaging: I have reviewed all pertinent imaging results/findings within the past 24 hours.

## 2022-07-29 NOTE — HPI
47 yo M underwent colostomy closure, MARIAM, appy and parasternal hernia repair today.  He is c/o pain.  He is about to have NGT placed to [facility].  He has ALEX but does not want a CPAP at this time q hs due to NGT and due to risk of N/V and aspiration this is probably a good idea.  Patient has had necrotizing fascitis of left thigh and has transmetarsal amputation of left foot with diabetic foot ulcer.  He follows in [place] for wound care with Rivas Stafford MD with [facility].  He is on full dose anticoagulation with eliquis which is being held at this time.  He does not know why he is on anticoagulation and I can find no record in hospital chart.      Patient had SMITH when he had alcoholic pancreatitis last year and follows with Dr. Flores and creat is now 1.3 and was previously 4.3.  He has been able to restart his glucophage and lisinopril.  Dr. Palmer is holding ACEI and CCB but continuing BB post op and monitoring BP closely.

## 2022-07-29 NOTE — PROGRESS NOTES
[facility] - Orthopedic  General Surgery  Progress Note    Subjective:     History of Present Illness:  No notes on file    Post-Op Info:  Procedure(s) (LRB):  COLONOSCOPY (N/A)  CLOSURE, COLOSTOMY (N/A)  APPLICATION, WOUND VAC (N/A)  LYSIS, ADHESIONS (N/A)  REPAIR, HERNIA, PARASTOMAL (N/A)  APPENDECTOMY (N/A)   1 Day Post-Op     No new subjective & objective note has been filed under this hospital service since the last note was generated.    Assessment/Plan:     Parastomal hernia  07/27/2022 colostomy  takedown  Open repair of parastomal hernia. NWPT LLQ with seal  LEE midline with seal, NGT high output, await ileus, /hr add PPI, PT has patient up to chair    Diabetic foot infection  07/29/2022 lateral  left foot wound gangrenous with abscess purelent drainage expressed  malodorous  Start vanc/zosyn npo after midnight for drainage debridement        Lizabeth Krause, ACNP  General Surgery  [facility] - Orthopedic

## 2022-07-29 NOTE — ASSESSMENT & PLAN NOTE
07/29/2022 lateral  left foot wound gangrenous with abscess purelent drainage expressed  malodorous  Start vanc/zosyn npo after midnight for drainage debridement

## 2022-07-29 NOTE — PROGRESS NOTES
Pharmacy consulted to dose vancomycin for bone/joint. Based on patient's weight and kidney function, start vanc 2500mg IV Q12H. Trough level ordered for 7/31/22 0230. Pharmacy will continue to follow and dose.

## 2022-07-29 NOTE — PT/OT/SLP EVAL
"Physical Therapy Evaluation    Patient Name:  Chaim Johnson   MRN:  [id number]    Recommendations:     Discharge Recommendations:   (to be determined by progress and pt arranging a new place to live)   Discharge Equipment Recommendations: walker, rolling   Barriers to discharge: ongoing medical treatment    Assessment:     Chaim Johnson is a 46 y.o. male admitted with a medical diagnosis of Aftercare following surgery. Colostomy take down; right foot wound.  He presents with the following impairments/functional limitations:  impaired endurance, impaired functional mobility, decreased lower extremity function, pain, impaired skin .    Patient participated well with PT evaluation and demonstrated competence with heel WB right LE to offload wound. Patient reports he has a toe-relief shoe at home. Patient to go back to surgery tomorrow for debridement of right foot wound. PT will follow up whilst pt hospitalized. D/c disposition is unknown as pt reports he has to find a new place to live.    Rehab Prognosis: Good; patient would benefit from acute skilled PT services to address these deficits and reach maximum level of function.    Recent Surgery: Procedure(s) (LRB):  COLONOSCOPY (N/A)  CLOSURE, COLOSTOMY (N/A)  APPLICATION, WOUND VAC (N/A)  LYSIS, ADHESIONS (N/A)  REPAIR, HERNIA, PARASTOMAL (N/A)  APPENDECTOMY (N/A) 1 Day Post-Op    Plan:     During this hospitalization, patient to be seen 5 x/week to address the identified rehab impairments via gait training, therapeutic activities, therapeutic exercises and progress toward the following goals:    · Plan of Care Expires:  08/29/22    Subjective     Chief Complaint: colostomy take down; right foot wound  Patient/Family Comments/goals: "I took IV abx x 8 weeks in a PICC line for that foot. I stopped about a month ago."  Pain/Comfort:  · Pain Rating 1: 6/10  · Location 1: abdomen  · Pain Addressed 1: Pre-medicate for activity, Reposition, Distraction, Cessation of Activity " "(PCA)  · Pain Rating Post-Intervention 1: 6/10    Patients cultural, spiritual, Roman Catholic conflicts given the current situation: no    Living Environment:  Pt states he has to find a new living arrangement and is not sure where he will go at discharge."  Prior to admission, patients level of function was independent.  Equipment used at home: CPAP (toe-relief shoe).  DME owned (not currently used): none.  Upon discharge, patient will have assistance from unknown.    Objective:     Communicated with Nava Zhang RN prior to session.  Patient found supine with wound vac, peripheral IV, PCA, NG tube, ABELINO drain (LEE vac)  upon PT entry to room.    General Precautions: Standard, fall   Orthopedic Precautions: (heel WB right LE)   Braces:  (abdominal binder)  Respiratory Status: Room air    Exams:  · Cognitive Exam:  Patient is oriented to Person, Place, Time and Situation  · Sensation:    · -       Impaired  light/touch progressively diminished distal to knees but proprioception remains intact  · Skin Integrity/Edema:      · -       Skin integrity: Wound R foot with prior amp of toes; undressed by DAHLIA Krause and found to have purelent drainage and foul odor. Pt to go for surgical debridement tomorrow.  · RLE ROM: WFL  · RLE Strength: WFL  · LLE ROM: WFL  · LLE Strength: WFL    Functional Mobility:  · Bed Mobility:     · Supine to Sit: minimum assistance and increased time and effort  · Transfers:     · Sit to Stand:  stand by assistance with rolling walker  · Bed to Chair: stand by assistance with  rolling walker  using  Step Transfer  · Gait: 40' with RW, SBA to manage lines, heel WB right; slow marily; no LOB    Therapeutic Activities and Exercises:  ·  Pt educated on PT role/POC.   · Importance of OOB activity with staff assistance.  · Importance of sitting up in the chair throughout the day as tolerated, especially for meals   · Safety during functional t/f and mobility with use of RW  · White board updated "   · Multiple self-care tasks/functional mobility completed- assistance level noted above   · All questions/concerns answered within PT scope of practice      AM-PAC 6 CLICK MOBILITY  Total Score:18     Patient left up in chair with all lines intact, call button in reach and Summer KATHRYN Zhang RN notified.    GOALS:   Multidisciplinary Problems     Physical Therapy Goals        Problem: Physical Therapy    Goal Priority Disciplines Outcome Goal Variances Interventions   Physical Therapy Goal     PT, PT/OT Ongoing, Progressing     Description: Short Term Goals to be met by: 2022    Patient will increase functional independence with mobility by performin. Supine to sit with independently  2. Sit to stand transfer with independently using Rolling walker and heel WB right LE  3. Bed to chair transfer with independently using Rolling walker and heel WB right LE  4. Gait  x 100 feet with independently using Rolling walker and heel WB right LE    Long Term Goals to be met by: 2022    Pt will regain full independent functional mobility with lowest level of assistive device to return to home situation and prior activities of daily living.                    History:     Past Medical History:   Diagnosis Date    Chronic kidney disease, stage 3a     Diabetes mellitus     Hypertension     Renal disorder     Sleep apnea     Toe ulcer 2021       Past Surgical History:   Procedure Laterality Date    APPENDECTOMY N/A 2022    Procedure: APPENDECTOMY;  Surgeon: Tripp Palmer DO;  Location: [facility] OR;  Service: General;  Laterality: N/A;    APPLICATION OF WOUND VACUUM-ASSISTED CLOSURE DEVICE N/A 2022    Procedure: APPLICATION, WOUND VAC;  Surgeon: Tripp Palmer DO;  Location: [facility] OR;  Service: General;  Laterality: N/A;    COLONOSCOPY N/A 2022    Procedure: COLONOSCOPY;  Surgeon: Tripp Palmer DO;  Location: [facility] OR;  Service: General;  Laterality: N/A;    INCISION  AND DRAINAGE OF ABSCESS Left 11/12/2021    Procedure: INCISION AND DRAINAGE, Left buttock and groin ABSCESS;  Surgeon: Tripp Palmer DO;  Location: [facility] OR;  Service: General;  Laterality: Left;  PACKING PLACED    INSERTION OF DIALYSIS CATHETER N/A 11/18/2021    Procedure: INSERTION, CATHETER, DIALYSIS;  Surgeon: Tripp Palmer DO;  Location: [facility] OR;  Service: General;  Laterality: N/A;    LYSIS OF ADHESIONS N/A 7/28/2022    Procedure: LYSIS, ADHESIONS;  Surgeon: Tripp Palmer DO;  Location: [facility] OR;  Service: General;  Laterality: N/A;    REPAIR, HERNIA, PARASTOMAL N/A 7/28/2022    Procedure: REPAIR, HERNIA, PARASTOMAL;  Surgeon: Tripp Palmer DO;  Location: [facility] OR;  Service: General;  Laterality: N/A;    REVISION COLOSTOMY N/A 11/24/2021    Procedure: ATTEMPTED REVISION COLOSTOMY;  Surgeon: Dov Almaguer MD;  Location: [facility] [place];  Service: General;  Laterality: N/A;    TOE AMPUTATION Right 11/12/2021    Procedure: AMPUTATION RIGHT 2ND TOE WITH WOUND DEBRIDEMENT @RIGHT GREAT TOE AMPUTATION SITE;  Surgeon: Tripp Palmer DO;  Location: [facility] OR;  Service: General;  Laterality: Right;    WOUND DEBRIDEMENT Left 11/14/2021    Procedure: DEBRIDEMENT, Buttock WOUND;  Surgeon: Tripp Palmer DO;  Location: [facility] OR;  Service: General;  Laterality: Left;    WOUND DEBRIDEMENT Left 11/18/2021    Procedure: DEBRIDEMENT, WOUND on LEFT BUTTOCK;  Surgeon: Tripp Palmer DO;  Location: [facility] OR;  Service: General;  Laterality: Left;    WOUND DEBRIDEMENT Left 11/22/2021    Procedure: DEBRIDEMENT, WOUND;  Surgeon: Dov Almaguer MD;  Location: [facility] [place];  Service: General;  Laterality: Left;  left groin, perineum, buttocks,upper thigh       Time Tracking:     PT Received On: 07/29/22  PT Start Time: 1435     PT Stop Time: 1459  PT Total Time (min): 24 min     Billable Minutes: Evaluation Moderate complexity      07/29/2022

## 2022-07-29 NOTE — PLAN OF CARE
[facility] - Orthopedic  Initial Discharge Assessment       Primary Care Provider: LANDON Evans    Admission Diagnosis: S/P colostomy [Z93.3]    Admission Date: 7/28/2022  Expected Discharge Date:     Discharge Barriers Identified: None    Payor: [employer] / Plan: [employer] [facility] [employer] [place] / Product Type: Commercial /     Extended Emergency Contact Information  Primary Emergency Contact: Remberto Christopher  Mobile Phone: [phone]  Relation: Brother  Secondary Emergency Contact: Rusty Johnson  Mobile Phone: [phone]  Relation: Son    Discharge Plan A: Home Health ([facility])  Discharge Plan B: Home      [facility] [address] [place] [address], [place] - [address]  [address] [facility] [place] [address]  Phone: [phone] Fax: [phone]    [facility], [place] - [address]  [address] [facility] [place] [address]  Phone: [phone] Fax: [phone]      Initial Assessment (most recent)     Adult Discharge Assessment - 07/29/22 1507        Discharge Assessment    Assessment Type Discharge Planning Assessment     Source of Information patient     Lives With other relative(s)     Do you expect to return to your current living situation? Yes     Do you have help at home or someone to help you manage your care at home? No     Prior to hospitilization cognitive status: Alert/Oriented     Current cognitive status: Alert/Oriented     Equipment Currently Used at Home CPAP     Readmission within 30 days? No     Patient currently being followed by outpatient case management? No     Do you currently have service(s) that help you manage your care at home? No     Do you have prescription coverage? Yes     Coverage [employer] and Medicaid     Do you have any problems affording any of your prescribed medications? No     Is the patient taking medications as prescribed? yes     Who is going to help you get home at discharge? Elissa Gallagher- Significant  Other     How do you get to doctors appointments? car, drives self     Are you on dialysis? No     Do you take coumadin? No     Discharge Plan A Home Health   [facility]    Discharge Plan B Home     DME Needed Upon Discharge  negative pressure wound therapy device     Discharge Plan discussed with: Patient     Discharge Barriers Identified None               MÓNICA consulted that pt will need wound vac to abd. MÓNICA spoke with pt and he lives at home with family but will not dc back home to the address he came from. Per pt he is in the process of moving into own place but it will be Monday before he will know anything. Per pt he has used [facility] in the past and wants to use them again. MÓNICA faxed initial hhr referral to [facility] and faxed dme referral for wound vac to [facility]. MÓNICA informed NP that pt stated he has no place to go until Monday. MÓNICA will cont to follow for dc needs.

## 2022-07-29 NOTE — NURSING
0718 received patient lying in bed, awake alert and oriented x4 in no acute distress. Abdominal binder intact, removed for assessment,. Left lower quadrant wound vac site CDI. Midline abdominal incision with LEE CDI, LEE on and functioning. ABELINO drain to right mid abdomen intact and draining blood. montelongo intact draining yellow urine. NG tube to low wall suction, canister marked at 600cc per previous shift. Dilaudid PCA connected, pt states pain is tolerable at this time. IV infusing as ordered. Vitals stable. Left pt in bed, call bell in reach, instructed to call with needs.     1101 pt in room. Dilaudid PCA syringe replaced, see MAR. Pt getting up with therapy at this time. Devorah NP rounding at this time. No distress noted.     1132 montelongo removed per orders. Left foot dressed with wet to dry dressing per orders from YOUNG Fairchild. No distress noted. Left pt in bed with safety measures  In place.     1325 pt resting in bed, call bell in reach, no distress noted. Ice chips given. No other needs voiced.     1523 pt sitting up in chair. No distress noted. Meds given, see MAR. All tubes and lines intact. Pt pleasant mood. Call bell in reach.     1743 pt resting in bed, all tubes, drains intact. IV infusing as ordered. Pt denies needs. meds given, see MAR. Safety measures in place. Instructed pt to call with needs.

## 2022-07-29 NOTE — SUBJECTIVE & OBJECTIVE
Interval History: Patient complains of abdominal pain.     Review of Systems   Respiratory:  Negative for shortness of breath.    Cardiovascular:  Negative for chest pain.   Gastrointestinal:  Positive for abdominal distention and abdominal pain.   Objective:     Vital Signs (Most Recent):  Temp: 99.1 °F (37.3 °C) (07/29/22 0400)  Pulse: 97 (07/29/22 0637)  Resp: 19 (07/29/22 0400)  BP: 126/82 (07/29/22 0637)  SpO2: 97 % (07/29/22 0400) Vital Signs (24h Range):  Temp:  [97.3 °F (36.3 °C)-99.4 °F (37.4 °C)] 99.1 °F (37.3 °C)  Pulse:  [] 97  Resp:  [13-24] 19  SpO2:  [93 %-100 %] 97 %  BP: (117-169)/() 126/82     Weight: 121.1 kg (267 lb)  Body mass index is 35.23 kg/m².    Intake/Output Summary (Last 24 hours) at 7/29/2022 0802  Last data filed at 7/29/2022 0700  Gross per 24 hour   Intake 2158.33 ml   Output 1965 ml   Net 193.33 ml      Physical Exam  Vitals and nursing note reviewed.   Constitutional:       Appearance: Normal appearance.   Cardiovascular:      Rate and Rhythm: Regular rhythm.      Heart sounds: Normal heart sounds.   Pulmonary:      Breath sounds: Normal breath sounds.   Abdominal:      General: There is distension.      Comments: NGT   Neurological:      Mental Status: He is alert.       Significant Labs: All pertinent labs within the past 24 hours have been reviewed.  BMP:   Recent Labs   Lab 07/29/22 0355   *      K 4.4   *   CO2 23   BUN 13   CREATININE 0.96   CALCIUM 8.8   MG 1.8     CBC:   Recent Labs   Lab 07/29/22 0355   WBC 13.77*   HGB 12.5*   HCT 38.4*        CMP:   Recent Labs   Lab 07/29/22 0355      K 4.4   *   CO2 23   *   BUN 13   CREATININE 0.96   CALCIUM 8.8   ANIONGAP 11   EGFRNONAA 90     POCT Glucose: No results for input(s): POCTGLUCOSE in the last 48 hours.    Significant Imaging:  none

## 2022-07-29 NOTE — PLAN OF CARE
Problem: Physical Therapy  Goal: Physical Therapy Goal  Description: Short Term Goals to be met by: 2022    Patient will increase functional independence with mobility by performin. Supine to sit with independently  2. Sit to stand transfer with independently using Rolling walker and heel WB right LE  3. Bed to chair transfer with independently using Rolling walker and heel WB right LE  4. Gait  x 100 feet with independently using Rolling walker and heel WB right LE    Long Term Goals to be met by: 2022    Pt will regain full independent functional mobility with lowest level of assistive device to return to home situation and prior activities of daily living.   Outcome: Ongoing, Progressing     Patient participated well with PT evaluation and demonstrated competence with heel WB right LE to offload wound. Patient reports he has a toe-relief shoe at home. Patient to go back to surgery tomorrow for debridement of right foot wound. PT will follow up whilst pt hospitalized. D/c disposition is unknown as pt reports he has to find a new place to live.

## 2022-07-29 NOTE — HOSPITAL COURSE
07/29 Patient complains of abdominal pain. Pain pump in place. NGT in place. Accucheck 155.POD#1.  07/30 OR today for debridement of right foot ulcer. NGT in place.   07/31 Patient voices no complaints. NG removed. Wound care. Accuchek 134    08/01 Records reviewed.   OR procedure 07/28 closure colostomy, repair hernia, appendectomy  OR procedure 07/30 debridement left lower extremity

## 2022-07-29 NOTE — PROGRESS NOTES
[facility] Medicine  Progress Note    Patient Name: Chaim Johnson  MRN: [id number]  Patient Class: IP- Surgery Admit   Admission Date: 7/28/2022  Length of Stay: 1 days  Attending Physician: Tripp Palmer DO  Primary Care Provider: LANDON Evans        Subjective:     Principal Problem:Aftercare following surgery        HPI:  47 yo M underwent colostomy closure, MARIAM, appy and parasternal hernia repair today.  He is c/o pain.  He is about to have NGT placed to LIS.  He has ALEX but does not want a CPAP at this time q hs due to NGT and due to risk of N/V and aspiration this is probably a good idea.  Patient has had necrotizing fascitis of left thigh and has transmetarsal amputation of left foot with diabetic foot ulcer.  He follows in [place] for wound care with Rivas Stafford MD with [facility].  He is on full dose anticoagulation with eliquis which is being held at this time.  He does not know why he is on anticoagulation and I can find no record in hospital chart.      Patient had SMITH when he had alcoholic pancreatitis last year and follows with Dr. Flores and creat is now 1.3 and was previously 4.3.  He has been able to restart his glucophage and lisinopril.  Dr. Palmer is holding ACEI and CCB but continuing BB post op and monitoring BP closely.        Overview/Hospital Course:  07/29 Patient complains of abdominal pain. Pain pump in place. NGT in place. Accucheck 155.POD#1.      Interval History: Patient complains of abdominal pain.     Review of Systems   Respiratory:  Negative for shortness of breath.    Cardiovascular:  Negative for chest pain.   Gastrointestinal:  Positive for abdominal distention and abdominal pain.   Objective:     Vital Signs (Most Recent):  Temp: 99.1 °F (37.3 °C) (07/29/22 0400)  Pulse: 97 (07/29/22 0637)  Resp: 19 (07/29/22 0400)  BP: 126/82 (07/29/22 0637)  SpO2: 97 % (07/29/22 0400) Vital Signs (24h Range):  Temp:  [97.3 °F  (36.3 °C)-99.4 °F (37.4 °C)] 99.1 °F (37.3 °C)  Pulse:  [] 97  Resp:  [13-24] 19  SpO2:  [93 %-100 %] 97 %  BP: (117-169)/() 126/82     Weight: 121.1 kg (267 lb)  Body mass index is 35.23 kg/m².    Intake/Output Summary (Last 24 hours) at 7/29/2022 0802  Last data filed at 7/29/2022 0700  Gross per 24 hour   Intake 2158.33 ml   Output 1965 ml   Net 193.33 ml      Physical Exam  Vitals and nursing note reviewed.   Constitutional:       Appearance: Normal appearance.   Cardiovascular:      Rate and Rhythm: Regular rhythm.      Heart sounds: Normal heart sounds.   Pulmonary:      Breath sounds: Normal breath sounds.   Abdominal:      General: There is distension.      Comments: NGT   Neurological:      Mental Status: He is alert.       Significant Labs: All pertinent labs within the past 24 hours have been reviewed.  BMP:   Recent Labs   Lab 07/29/22  0355   *      K 4.4   *   CO2 23   BUN 13   CREATININE 0.96   CALCIUM 8.8   MG 1.8     CBC:   Recent Labs   Lab 07/29/22  0355   WBC 13.77*   HGB 12.5*   HCT 38.4*        CMP:   Recent Labs   Lab 07/29/22  0355      K 4.4   *   CO2 23   *   BUN 13   CREATININE 0.96   CALCIUM 8.8   ANIONGAP 11   EGFRNONAA 90     POCT Glucose: No results for input(s): POCTGLUCOSE in the last 48 hours.    Significant Imaging:  none      Assessment/Plan:      * Aftercare following surgery  Defer to primary team.        Diabetes mellitus  Patient's FSGs are controlled on current medication regimen.  Last A1c reviewed- No results found for: LABA1C, HGBA1C  Most recent fingerstick glucose reviewed- No results for input(s): POCTGLUCOSE in the last 24 hours.  Current correctional scale  Medium  Maintain anti-hyperglycemic dose as follows-   Antihyperglycemics (From admission, onward)            Start     Stop Route Frequency Ordered    07/28/22 2100  insulin detemir U-100 injection 15 Units         -- SubQ Nightly 07/28/22 1818    07/28/22  1917  insulin aspart U-100 injection 1-10 Units         -- SubQ Before meals & nightly PRN 07/28/22 1818    07/28/22 1754  insulin aspart U-100 injection 1-10 Units         -- SubQ Before meals & nightly PRN 07/28/22 1754        Hold Oral hypoglycemics while patient is in the hospital.  Continue basal insulin with correctional bolus.    Restart nutritional insulin if appropriate when tolerating PO.      ALEX (obstructive sleep apnea)  Holding CPAP until NGT discontinued and no longer aspiration risk.       Benign essential HTN  Holding ACE and CCB at present.  Restart when BP tolerates  Continue BB.         VTE Risk Mitigation (From admission, onward)         Ordered     enoxaparin injection 40 mg  Daily         07/28/22 1754     IP VTE HIGH RISK PATIENT  Once         07/28/22 1754     Place sequential compression device  Until discontinued         07/28/22 1754     Place sequential compression device  Until discontinued         07/28/22 1754                Discharge Planning   KASSIE:      Code Status: Full Code   Is the patient medically ready for discharge?:     Reason for patient still in hospital (select all that apply): Other (specify) Pain mangement.Monitor BONNIE Franco MD  Department of Hospital Medicine   [facility] - [facility]

## 2022-07-29 NOTE — ASSESSMENT & PLAN NOTE
Patient's FSGs are controlled on current medication regimen.  Last A1c reviewed- No results found for: LABA1C, HGBA1C  Most recent fingerstick glucose reviewed- No results for input(s): POCTGLUCOSE in the last 24 hours.  Current correctional scale  Medium  Maintain anti-hyperglycemic dose as follows-   Antihyperglycemics (From admission, onward)            Start     Stop Route Frequency Ordered    07/28/22 2100  insulin detemir U-100 injection 15 Units         -- SubQ Nightly 07/28/22 1818 07/28/22 1917  insulin aspart U-100 injection 1-10 Units         -- SubQ Before meals & nightly PRN 07/28/22 1818    07/28/22 1754  insulin aspart U-100 injection 1-10 Units         -- SubQ Before meals & nightly PRN 07/28/22 1754        Hold Oral hypoglycemics while patient is in the hospital.  Continue basal insulin with correctional bolus.    Restart nutritional insulin if appropriate when tolerating PO.

## 2022-07-29 NOTE — ASSESSMENT & PLAN NOTE
07/27/2022 colostomy  takedown  Open repair of parastomal hernia. NWPT LLQ with seal  LEE midline with seal, NGT high output, await ileus, /hr add PPI, PT has patient up to chair

## 2022-07-30 ENCOUNTER — ANESTHESIA (OUTPATIENT)
Dept: SURGERY | Facility: HOSPITAL | Age: 47
DRG: 329 | End: 2022-07-30
Payer: COMMERCIAL

## 2022-07-30 ENCOUNTER — ANESTHESIA EVENT (OUTPATIENT)
Dept: SURGERY | Facility: HOSPITAL | Age: 47
DRG: 329 | End: 2022-07-30
Payer: COMMERCIAL

## 2022-07-30 LAB
ANION GAP SERPL CALCULATED.3IONS-SCNC: 18 MMOL/L (ref 7–16)
BASOPHILS # BLD AUTO: 0.03 K/UL (ref 0–0.2)
BASOPHILS NFR BLD AUTO: 0.2 % (ref 0–1)
BUN SERPL-MCNC: 12 MG/DL (ref 7–18)
BUN/CREAT SERPL: 10 (ref 6–20)
CALCIUM SERPL-MCNC: 8.8 MG/DL (ref 8.5–10.1)
CHLORIDE SERPL-SCNC: 108 MMOL/L (ref 98–107)
CO2 SERPL-SCNC: 21 MMOL/L (ref 21–32)
CREAT SERPL-MCNC: 1.18 MG/DL (ref 0.7–1.3)
DIFFERENTIAL METHOD BLD: ABNORMAL
EOSINOPHIL # BLD AUTO: 0.23 K/UL (ref 0–0.5)
EOSINOPHIL NFR BLD AUTO: 1.7 % (ref 1–4)
ERYTHROCYTE [DISTWIDTH] IN BLOOD BY AUTOMATED COUNT: 14.1 % (ref 11.5–14.5)
GLUCOSE SERPL-MCNC: 104 MG/DL (ref 70–105)
GLUCOSE SERPL-MCNC: 130 MG/DL (ref 70–105)
GLUCOSE SERPL-MCNC: 133 MG/DL (ref 70–105)
GLUCOSE SERPL-MCNC: 85 MG/DL (ref 70–105)
GLUCOSE SERPL-MCNC: 86 MG/DL (ref 74–106)
GLUCOSE SERPL-MCNC: 89 MG/DL (ref 70–105)
GLUCOSE SERPL-MCNC: 97 MG/DL (ref 70–105)
HCT VFR BLD AUTO: 33.8 % (ref 40–54)
HGB BLD-MCNC: 10.7 G/DL (ref 13.5–18)
IMM GRANULOCYTES # BLD AUTO: 0.07 K/UL (ref 0–0.04)
IMM GRANULOCYTES NFR BLD: 0.5 % (ref 0–0.4)
LYMPHOCYTES # BLD AUTO: 1.4 K/UL (ref 1–4.8)
LYMPHOCYTES NFR BLD AUTO: 10.2 % (ref 27–41)
MAGNESIUM SERPL-MCNC: 1.6 MG/DL (ref 1.7–2.3)
MCH RBC QN AUTO: 29.7 PG (ref 27–31)
MCHC RBC AUTO-ENTMCNC: 31.7 G/DL (ref 32–36)
MCV RBC AUTO: 93.9 FL (ref 80–96)
MONOCYTES # BLD AUTO: 1.07 K/UL (ref 0–0.8)
MONOCYTES NFR BLD AUTO: 7.8 % (ref 2–6)
MPC BLD CALC-MCNC: 10.5 FL (ref 9.4–12.4)
NEUTROPHILS # BLD AUTO: 10.98 K/UL (ref 1.8–7.7)
NEUTROPHILS NFR BLD AUTO: 79.6 % (ref 53–65)
NRBC # BLD AUTO: 0 X10E3/UL
NRBC, AUTO (.00): 0 %
PHOSPHATE SERPL-MCNC: 3 MG/DL (ref 2.5–4.5)
PLATELET # BLD AUTO: 345 K/UL (ref 150–400)
POTASSIUM SERPL-SCNC: 3.8 MMOL/L (ref 3.5–5.1)
RBC # BLD AUTO: 3.6 M/UL (ref 4.6–6.2)
SODIUM SERPL-SCNC: 143 MMOL/L (ref 136–145)
WBC # BLD AUTO: 13.78 K/UL (ref 4.5–11)

## 2022-07-30 PROCEDURE — 25000003 PHARM REV CODE 250: Performed by: STUDENT IN AN ORGANIZED HEALTH CARE EDUCATION/TRAINING PROGRAM

## 2022-07-30 PROCEDURE — 63600175 PHARM REV CODE 636 W HCPCS: Performed by: NURSE ANESTHETIST, CERTIFIED REGISTERED

## 2022-07-30 PROCEDURE — 82962 GLUCOSE BLOOD TEST: CPT

## 2022-07-30 PROCEDURE — 36000707: Performed by: STUDENT IN AN ORGANIZED HEALTH CARE EDUCATION/TRAINING PROGRAM

## 2022-07-30 PROCEDURE — 87653 STREP B DNA AMP PROBE: CPT | Performed by: STUDENT IN AN ORGANIZED HEALTH CARE EDUCATION/TRAINING PROGRAM

## 2022-07-30 PROCEDURE — 37000008 HC ANESTHESIA 1ST 15 MINUTES: Performed by: STUDENT IN AN ORGANIZED HEALTH CARE EDUCATION/TRAINING PROGRAM

## 2022-07-30 PROCEDURE — 63600175 PHARM REV CODE 636 W HCPCS: Performed by: ANESTHESIOLOGY

## 2022-07-30 PROCEDURE — 37000009 HC ANESTHESIA EA ADD 15 MINS: Performed by: STUDENT IN AN ORGANIZED HEALTH CARE EDUCATION/TRAINING PROGRAM

## 2022-07-30 PROCEDURE — D9220A PRA ANESTHESIA: ICD-10-PCS | Mod: ANES,,, | Performed by: ANESTHESIOLOGY

## 2022-07-30 PROCEDURE — D9220A PRA ANESTHESIA: Mod: CRNA,,, | Performed by: NURSE ANESTHETIST, CERTIFIED REGISTERED

## 2022-07-30 PROCEDURE — 85025 COMPLETE CBC W/AUTO DIFF WBC: CPT | Performed by: STUDENT IN AN ORGANIZED HEALTH CARE EDUCATION/TRAINING PROGRAM

## 2022-07-30 PROCEDURE — D9220A PRA ANESTHESIA: Mod: ANES,,, | Performed by: ANESTHESIOLOGY

## 2022-07-30 PROCEDURE — 63600175 PHARM REV CODE 636 W HCPCS: Performed by: STUDENT IN AN ORGANIZED HEALTH CARE EDUCATION/TRAINING PROGRAM

## 2022-07-30 PROCEDURE — 27000716 HC OXISENSOR PROBE, ANY SIZE: Performed by: ANESTHESIOLOGY

## 2022-07-30 PROCEDURE — 11000001 HC ACUTE MED/SURG PRIVATE ROOM

## 2022-07-30 PROCEDURE — 11043 DBRDMT MUSC&/FSCA 1ST 20/<: CPT | Mod: 79,,, | Performed by: STUDENT IN AN ORGANIZED HEALTH CARE EDUCATION/TRAINING PROGRAM

## 2022-07-30 PROCEDURE — 80048 BASIC METABOLIC PNL TOTAL CA: CPT | Performed by: STUDENT IN AN ORGANIZED HEALTH CARE EDUCATION/TRAINING PROGRAM

## 2022-07-30 PROCEDURE — 27000510 HC BLANKET BAIR HUGGER ANY SIZE: Performed by: ANESTHESIOLOGY

## 2022-07-30 PROCEDURE — 36415 COLL VENOUS BLD VENIPUNCTURE: CPT | Performed by: STUDENT IN AN ORGANIZED HEALTH CARE EDUCATION/TRAINING PROGRAM

## 2022-07-30 PROCEDURE — 63600175 PHARM REV CODE 636 W HCPCS: Performed by: NURSE PRACTITIONER

## 2022-07-30 PROCEDURE — 27000165 HC TUBE, ETT CUFFED: Performed by: ANESTHESIOLOGY

## 2022-07-30 PROCEDURE — 11043 PR DEBRIDEMENT, SKIN, SUB-Q TISSUE,MUSCLE,=<20 SQ CM: ICD-10-PCS | Mod: 79,,, | Performed by: STUDENT IN AN ORGANIZED HEALTH CARE EDUCATION/TRAINING PROGRAM

## 2022-07-30 PROCEDURE — 25000003 PHARM REV CODE 250: Performed by: NURSE PRACTITIONER

## 2022-07-30 PROCEDURE — 99232 SBSQ HOSP IP/OBS MODERATE 35: CPT | Mod: ,,, | Performed by: FAMILY MEDICINE

## 2022-07-30 PROCEDURE — 36000706: Performed by: STUDENT IN AN ORGANIZED HEALTH CARE EDUCATION/TRAINING PROGRAM

## 2022-07-30 PROCEDURE — 84100 ASSAY OF PHOSPHORUS: CPT | Performed by: STUDENT IN AN ORGANIZED HEALTH CARE EDUCATION/TRAINING PROGRAM

## 2022-07-30 PROCEDURE — 71000033 HC RECOVERY, INTIAL HOUR: Performed by: STUDENT IN AN ORGANIZED HEALTH CARE EDUCATION/TRAINING PROGRAM

## 2022-07-30 PROCEDURE — 99232 PR SUBSEQUENT HOSPITAL CARE,LEVL II: ICD-10-PCS | Mod: ,,, | Performed by: FAMILY MEDICINE

## 2022-07-30 PROCEDURE — 25000003 PHARM REV CODE 250: Performed by: HOSPITALIST

## 2022-07-30 PROCEDURE — 83735 ASSAY OF MAGNESIUM: CPT | Performed by: STUDENT IN AN ORGANIZED HEALTH CARE EDUCATION/TRAINING PROGRAM

## 2022-07-30 PROCEDURE — 25000003 PHARM REV CODE 250: Performed by: NURSE ANESTHETIST, CERTIFIED REGISTERED

## 2022-07-30 PROCEDURE — 87077 CULTURE AEROBIC IDENTIFY: CPT | Performed by: STUDENT IN AN ORGANIZED HEALTH CARE EDUCATION/TRAINING PROGRAM

## 2022-07-30 PROCEDURE — C9113 INJ PANTOPRAZOLE SODIUM, VIA: HCPCS | Performed by: STUDENT IN AN ORGANIZED HEALTH CARE EDUCATION/TRAINING PROGRAM

## 2022-07-30 PROCEDURE — 87070 CULTURE OTHR SPECIMN AEROBIC: CPT | Performed by: STUDENT IN AN ORGANIZED HEALTH CARE EDUCATION/TRAINING PROGRAM

## 2022-07-30 PROCEDURE — 27201423 OPTIME MED/SURG SUP & DEVICES STERILE SUPPLY: Performed by: STUDENT IN AN ORGANIZED HEALTH CARE EDUCATION/TRAINING PROGRAM

## 2022-07-30 PROCEDURE — D9220A PRA ANESTHESIA: ICD-10-PCS | Mod: CRNA,,, | Performed by: NURSE ANESTHETIST, CERTIFIED REGISTERED

## 2022-07-30 PROCEDURE — 27000655: Performed by: ANESTHESIOLOGY

## 2022-07-30 PROCEDURE — 27000689 HC BLADE LARYNGOSCOPE ANY SIZE: Performed by: ANESTHESIOLOGY

## 2022-07-30 RX ORDER — DIPHENHYDRAMINE HCL 25 MG
50 CAPSULE ORAL EVERY 6 HOURS PRN
Status: DISCONTINUED | OUTPATIENT
Start: 2022-07-30 | End: 2022-08-05 | Stop reason: HOSPADM

## 2022-07-30 RX ORDER — DIPHENHYDRAMINE HYDROCHLORIDE 50 MG/ML
25 INJECTION INTRAMUSCULAR; INTRAVENOUS EVERY 6 HOURS PRN
Status: DISCONTINUED | OUTPATIENT
Start: 2022-07-30 | End: 2022-07-30 | Stop reason: HOSPADM

## 2022-07-30 RX ORDER — HYDROMORPHONE HYDROCHLORIDE 2 MG/ML
0.5 INJECTION, SOLUTION INTRAMUSCULAR; INTRAVENOUS; SUBCUTANEOUS EVERY 5 MIN PRN
Status: DISCONTINUED | OUTPATIENT
Start: 2022-07-30 | End: 2022-07-30 | Stop reason: HOSPADM

## 2022-07-30 RX ORDER — LIDOCAINE HYDROCHLORIDE 20 MG/ML
INJECTION, SOLUTION EPIDURAL; INFILTRATION; INTRACAUDAL; PERINEURAL
Status: DISCONTINUED | OUTPATIENT
Start: 2022-07-30 | End: 2022-07-30

## 2022-07-30 RX ORDER — ONDANSETRON 2 MG/ML
4 INJECTION INTRAMUSCULAR; INTRAVENOUS DAILY PRN
Status: DISCONTINUED | OUTPATIENT
Start: 2022-07-30 | End: 2022-07-30 | Stop reason: HOSPADM

## 2022-07-30 RX ORDER — MEPERIDINE HYDROCHLORIDE 25 MG/ML
25 INJECTION INTRAMUSCULAR; INTRAVENOUS; SUBCUTANEOUS EVERY 10 MIN PRN
Status: DISCONTINUED | OUTPATIENT
Start: 2022-07-30 | End: 2022-07-30 | Stop reason: HOSPADM

## 2022-07-30 RX ORDER — SUCCINYLCHOLINE CHLORIDE 20 MG/ML
INJECTION INTRAMUSCULAR; INTRAVENOUS
Status: DISCONTINUED | OUTPATIENT
Start: 2022-07-30 | End: 2022-07-30

## 2022-07-30 RX ORDER — SODIUM CHLORIDE 9 MG/ML
INJECTION, SOLUTION INTRAVENOUS CONTINUOUS PRN
Status: DISCONTINUED | OUTPATIENT
Start: 2022-07-30 | End: 2022-07-30

## 2022-07-30 RX ORDER — MORPHINE SULFATE 10 MG/ML
4 INJECTION INTRAMUSCULAR; INTRAVENOUS; SUBCUTANEOUS EVERY 5 MIN PRN
Status: DISCONTINUED | OUTPATIENT
Start: 2022-07-30 | End: 2022-07-30 | Stop reason: HOSPADM

## 2022-07-30 RX ORDER — CEFAZOLIN SODIUM 1 G/3ML
INJECTION, POWDER, FOR SOLUTION INTRAMUSCULAR; INTRAVENOUS
Status: DISCONTINUED | OUTPATIENT
Start: 2022-07-30 | End: 2022-07-30

## 2022-07-30 RX ORDER — PROPOFOL 10 MG/ML
VIAL (ML) INTRAVENOUS
Status: DISCONTINUED | OUTPATIENT
Start: 2022-07-30 | End: 2022-07-30

## 2022-07-30 RX ORDER — PHENYLEPHRINE HYDROCHLORIDE 10 MG/ML
INJECTION INTRAVENOUS
Status: DISCONTINUED | OUTPATIENT
Start: 2022-07-30 | End: 2022-07-30

## 2022-07-30 RX ORDER — OXYCODONE HYDROCHLORIDE 5 MG/1
5 TABLET ORAL
Status: DISCONTINUED | OUTPATIENT
Start: 2022-07-30 | End: 2022-07-30 | Stop reason: HOSPADM

## 2022-07-30 RX ADMIN — METOPROLOL TARTRATE 5 MG: 5 INJECTION, SOLUTION INTRAVENOUS at 12:07

## 2022-07-30 RX ADMIN — SODIUM CHLORIDE, POTASSIUM CHLORIDE, SODIUM LACTATE AND CALCIUM CHLORIDE: 600; 310; 30; 20 INJECTION, SOLUTION INTRAVENOUS at 09:07

## 2022-07-30 RX ADMIN — VANCOMYCIN HYDROCHLORIDE 2500 MG: 5 INJECTION, POWDER, LYOPHILIZED, FOR SOLUTION INTRAVENOUS at 03:07

## 2022-07-30 RX ADMIN — Medication: at 03:07

## 2022-07-30 RX ADMIN — PIPERACILLIN SODIUM AND TAZOBACTAM SODIUM 4.5 G: 4; .5 INJECTION, POWDER, LYOPHILIZED, FOR SOLUTION INTRAVENOUS at 02:07

## 2022-07-30 RX ADMIN — METOPROLOL TARTRATE 5 MG: 5 INJECTION, SOLUTION INTRAVENOUS at 02:07

## 2022-07-30 RX ADMIN — ENOXAPARIN SODIUM 40 MG: 40 INJECTION SUBCUTANEOUS at 05:07

## 2022-07-30 RX ADMIN — PHENYLEPHRINE HYDROCHLORIDE 100 MCG: 10 INJECTION INTRAVENOUS at 09:07

## 2022-07-30 RX ADMIN — LIDOCAINE HYDROCHLORIDE 100 MG: 20 INJECTION, SOLUTION EPIDURAL; INFILTRATION; INTRACAUDAL; PERINEURAL at 08:07

## 2022-07-30 RX ADMIN — PHENYLEPHRINE HYDROCHLORIDE 100 MCG: 10 INJECTION INTRAVENOUS at 08:07

## 2022-07-30 RX ADMIN — SODIUM CHLORIDE, POTASSIUM CHLORIDE, SODIUM LACTATE AND CALCIUM CHLORIDE: 600; 310; 30; 20 INJECTION, SOLUTION INTRAVENOUS at 02:07

## 2022-07-30 RX ADMIN — METOPROLOL TARTRATE 5 MG: 5 INJECTION, SOLUTION INTRAVENOUS at 05:07

## 2022-07-30 RX ADMIN — VANCOMYCIN HYDROCHLORIDE 2500 MG: 5 INJECTION, POWDER, LYOPHILIZED, FOR SOLUTION INTRAVENOUS at 02:07

## 2022-07-30 RX ADMIN — PIPERACILLIN SODIUM AND TAZOBACTAM SODIUM 4.5 G: 4; .5 INJECTION, POWDER, LYOPHILIZED, FOR SOLUTION INTRAVENOUS at 09:07

## 2022-07-30 RX ADMIN — DIPHENHYDRAMINE HYDROCHLORIDE 50 MG: 25 CAPSULE ORAL at 09:07

## 2022-07-30 RX ADMIN — MORPHINE SULFATE 4 MG: 10 INJECTION INTRAVENOUS at 09:07

## 2022-07-30 RX ADMIN — PANTOPRAZOLE SODIUM 40 MG: 40 INJECTION, POWDER, FOR SOLUTION INTRAVENOUS at 11:07

## 2022-07-30 RX ADMIN — SUCCINYLCHOLINE CHLORIDE 100 MG: 20 INJECTION, SOLUTION INTRAMUSCULAR; INTRAVENOUS at 08:07

## 2022-07-30 RX ADMIN — Medication: at 06:07

## 2022-07-30 RX ADMIN — PROPOFOL 150 MG: 10 INJECTION, EMULSION INTRAVENOUS at 08:07

## 2022-07-30 RX ADMIN — CEFAZOLIN 2 G: 1 INJECTION, POWDER, FOR SOLUTION INTRAMUSCULAR; INTRAVENOUS; PARENTERAL at 08:07

## 2022-07-30 RX ADMIN — METOPROLOL TARTRATE 5 MG: 5 INJECTION, SOLUTION INTRAVENOUS at 06:07

## 2022-07-30 RX ADMIN — SODIUM CHLORIDE: 9 INJECTION, SOLUTION INTRAVENOUS at 08:07

## 2022-07-30 RX ADMIN — MORPHINE SULFATE 2 MG: 10 INJECTION INTRAVENOUS at 10:07

## 2022-07-30 NOTE — SUBJECTIVE & OBJECTIVE
Interval History: Patient voices no complaints    Review of Systems   Respiratory:  Negative for shortness of breath.    Cardiovascular:  Negative for chest pain.   Gastrointestinal:  Positive for abdominal distention.   Skin:  Positive for wound.   Objective:     Vital Signs (Most Recent):  Temp: 98.8 °F (37.1 °C) (07/30/22 0400)  Pulse: 91 (07/30/22 0400)  Resp: 19 (07/30/22 0400)  BP: (!) 142/91 (nurse notified) (07/30/22 0400)  SpO2: 95 % (07/30/22 0400)   Vital Signs (24h Range):  Temp:  [98.3 °F (36.8 °C)-99.6 °F (37.6 °C)] 98.8 °F (37.1 °C)  Pulse:  [] 91  Resp:  [18-19] 19  SpO2:  [95 %-98 %] 95 %  BP: (139-167)/() 142/91     Weight: 121.1 kg (267 lb)  Body mass index is 35.23 kg/m².    Intake/Output Summary (Last 24 hours) at 7/30/2022 0817  Last data filed at 7/30/2022 0600  Gross per 24 hour   Intake 1243.33 ml   Output 2135 ml   Net -891.67 ml      Physical Exam  Vitals and nursing note reviewed.   Cardiovascular:      Rate and Rhythm: Regular rhythm.      Heart sounds: Normal heart sounds.   Pulmonary:      Breath sounds: Normal breath sounds.   Abdominal:      Comments: NGT   Skin:     Findings: Lesion present.   Neurological:      Mental Status: He is alert.       Significant Labs: All pertinent labs within the past 24 hours have been reviewed.  BMP:   Recent Labs   Lab 07/30/22  0336   GLU 86      K 3.8   *   CO2 21   BUN 12   CREATININE 1.18   CALCIUM 8.8   MG 1.6*     CBC:   Recent Labs   Lab 07/29/22  0355 07/30/22 0336   WBC 13.77* 13.78*   HGB 12.5* 10.7*   HCT 38.4* 33.8*    345     CMP:   Recent Labs   Lab 07/29/22  0355 07/30/22 0336    143   K 4.4 3.8   * 108*   CO2 23 21   * 86   BUN 13 12   CREATININE 0.96 1.18   CALCIUM 8.8 8.8   ANIONGAP 11 18*   EGFRNONAA 90 71     POCT Glucose: No results for input(s): POCTGLUCOSE in the last 48 hours.    Significant Imaging:  none

## 2022-07-30 NOTE — PLAN OF CARE
Problem: Infection  Goal: Absence of Infection Signs and Symptoms  Outcome: Ongoing, Progressing     Problem: Fluid and Electrolyte Imbalance (Acute Kidney Injury/Impairment)  Goal: Fluid and Electrolyte Balance  Outcome: Ongoing, Progressing     Problem: Adult Inpatient Plan of Care  Goal: Plan of Care Review  Outcome: Ongoing, Progressing  Flowsheets (Taken 7/30/2022 0579)  Plan of Care Reviewed With: patient

## 2022-07-30 NOTE — OP NOTE
[facility] - Periop Services  Surgery Department  Operative Note    SUMMARY     Date of Procedure: 7/30/2022     Procedure: Procedure(s) (LRB):  DEBRIDEMENT, LOWER EXTREMITY (Left)     Surgeon(s) and Role:     * Tripp Palmer DO - Primary    Assisting Surgeon: None    Pre-Operative Diagnosis: Diabetic foot infection [E11.628, L08.9]    Post-Operative Diagnosis: Post-Op Diagnosis Codes:     * Diabetic foot infection [E11.628, L08.9]    Anesthesia: General/MAC    Operative Findings (including complications, if any):  Necrotic muscle was left foot extended all the way to the bone    Description of Technical Procedures:  Patient was taken the operating room and placed on the operating table in supine position.  Patient underwent general anesthesia per the anesthesia team.  The left foot was prepped and draped in usual sterile fashion.  Prior amputation over the top portion of the toes intact and healing with secondary intention.  The lateral portion of the foot open and draining with necrotic tissue; necrotic area measured 6 x 3 cm we debrided this all the way down with electrocautery to healthy muscle and bone; wound measures 6 x 3 x 2 cm; wound cultures and bone culture taken and sent to microbiology.  Bleeding controlled electrocautery.  Area irrigated and then packed wet to dry.  Sterile dressing applied.  Patient tolerated procedure well.  Patient was awakened, and taken the PACU in stable condition        Estimated Blood Loss (EBL):  2 cc           Implants: * No implants in log *    Specimens:   Specimen (24h ago, onward)            None                  Condition: Good    Disposition: PACU - hemodynamically stable.    Attestation: I was present and scrubbed for the entire procedure.

## 2022-07-30 NOTE — ANESTHESIA PREPROCEDURE EVALUATION
07/30/2022  Chaim Johnson is a 46 y.o., male.      Pre-op Assessment    I have reviewed the Patient Summary Reports.     I have reviewed the Nursing Notes. I have reviewed the NPO Status.   I have reviewed the Medications.     Review of Systems  Anesthesia Hx:  No problems with previous Anesthesia    Social:  No Alcohol Use, Smoker    Hematology/Oncology:  Hematology Normal   Oncology Normal     EENT/Dental:EENT/Dental Normal   Cardiovascular:   Hypertension    Pulmonary:   Sleep Apnea    Renal/:  Renal/ Normal     Hepatic/GI:  Hepatic/GI Normal    Musculoskeletal:  Musculoskeletal Normal    Neurological:  Neurology Normal    Endocrine:   Diabetes, poorly controlled, type 2  Morbid Obesity / BMI > 40  Dermatological:  Skin Normal    Psych:  Psychiatric Normal           Physical Exam  General: Well nourished    Airway:  Mallampati: III / III  Mouth Opening: Normal  TM Distance: > 6 cm  Tongue: Normal  Neck ROM: Normal ROM    Chest/Lungs:  Clear to auscultation, Normal Respiratory Rate    Heart:  Rate: Normal  Rhythm: Regular Rhythm        Chemistry        Component Value Date/Time     07/30/2022 0336    K 3.8 07/30/2022 0336     (H) 07/30/2022 0336    CO2 21 07/30/2022 0336    BUN 12 07/30/2022 0336    CREATININE 1.18 07/30/2022 0336    GLU 86 07/30/2022 0336        Component Value Date/Time    CALCIUM 8.8 07/30/2022 0336    ALKPHOS 161 (H) 12/10/2021 0350    AST 39 (H) 12/10/2021 0350    ALT 38 12/10/2021 0350    BILITOT 0.2 12/10/2021 0350    ESTGFRAFRICA 39 (L) 12/10/2021 0350    EGFRNONAA 71 07/30/2022 0336        Lab Results   Component Value Date    WBC 13.78 (H) 07/30/2022    RBC 3.60 (L) 07/30/2022    HGB 10.7 (L) 07/30/2022    MCV 93.9 07/30/2022    MCH 29.7 07/30/2022    MCHC 31.7 (L) 07/30/2022    RDW 14.1 07/30/2022     07/30/2022    MPV 10.5 07/30/2022    LYMPH 10.2 (L)  07/30/2022    LYMPH 1.40 07/30/2022    MONO 7.8 (H) 07/30/2022    EOS 0.23 07/30/2022    BASO 0.03 07/30/2022     Results for orders placed or performed during the hospital encounter of 11/12/21   EKG 12-lead    Collection Time: 11/12/21  5:06 PM    Narrative    Test Reason : E11.10,    Vent. Rate : 110 BPM     Atrial Rate : 000 BPM     P-R Int : 134 ms          QRS Dur : 088 ms      QT Int : 330 ms       P-R-T Axes : 073 061 048 degrees     QTc Int : 418 ms    Sinus tachycardia  Inferior/lateral T wave abnormality  is nonspecific  Borderline ECG    Confirmed by Dain Knight MD ([id number]) on 11/14/2021 4:29:12 AM    Referred By: CHERYL BANEGAS           Confirmed By:Dain Knight MD         Anesthesia Plan  Type of Anesthesia, risks & benefits discussed:    Anesthesia Type: Gen Supraglottic Airway  Intra-op Monitoring Plan: Standard ASA Monitors  Post Op Pain Control Plan: multimodal analgesia  Induction:  IV  Informed Consent: Informed consent signed with the Patient and all parties understand the risks and agree with anesthesia plan.  All questions answered. Patient consented to blood products? Yes  ASA Score: 3  Day of Surgery Review of History & Physical: H&P Update referred to the surgeon/provider.I have interviewed and examined the patient. I have reviewed the patient's H&P dated: There are no significant changes. H&P completed by Anesthesiologist.    Ready For Surgery From Anesthesia Perspective.     .

## 2022-07-30 NOTE — NURSING
Resting in the bed. Held NPO past midnight. VS stable. Afebrile. Refused bath but did allow nurse to perform a CHG bath and linen change. PCA settings cleared. NG, LEE, Wound vac/ and ABELINO drain intact. Attempted new IV site X 2 staff nurses. Unsuccessful. Unable to give Zosyn at the scheduled time. Due to incompatibility with LR. Will inform oncoming nurse of the pt's need for additional IV access.

## 2022-07-30 NOTE — PROGRESS NOTES
To the OR for debridement of left foot.    Risks and benefits explained with the patient including risks for infection, bleeding, injury to surrounding structures, hematoma/seroma formation with need for possible evacuation, possible open.  The patient verbalized understanding, agrees and wishes to proceed with surgery.

## 2022-07-30 NOTE — TRANSFER OF CARE
"Anesthesia Transfer of Care Note    Patient: Chaim Johnson    Procedure(s) Performed: Procedure(s) (LRB):  DEBRIDEMENT, LOWER EXTREMITY (Left)    Patient location: PACU    Anesthesia Type: general    Transport from OR: Transported from OR on room air with adequate spontaneous ventilation    Post pain: adequate analgesia    Post assessment: no apparent anesthetic complications    Post vital signs: stable    Level of consciousness: lethargic and awake    Nausea/Vomiting: no nausea/vomiting    Complications: none    Transfer of care protocol was followed      Last vitals:   Visit Vitals  BP (!) 147/90   Pulse 92   Temp 37.4 °C (99.3 °F) (Oral)   Resp 12   Ht 6' 1" (1.854 m)   Wt 121.1 kg (267 lb)   SpO2 96%   BMI 35.23 kg/m²     "

## 2022-07-30 NOTE — ANESTHESIA POSTPROCEDURE EVALUATION
Anesthesia Post Evaluation    Patient: Chaim Johnson    Procedure(s) Performed: Procedure(s) (LRB):  DEBRIDEMENT, LOWER EXTREMITY (Left)    Final Anesthesia Type: general      Patient location during evaluation: PACU  Patient participation: Yes- Able to Participate  Level of consciousness: awake and alert  Post-procedure vital signs: reviewed and stable  Pain management: adequate  Airway patency: patent  ALEX mitigation strategies: Multimodal analgesia  PONV status at discharge: No PONV  Anesthetic complications: no      Cardiovascular status: blood pressure returned to baseline  Respiratory status: unassisted  Hydration status: euvolemic  Follow-up not needed.          Vitals Value Taken Time   /80 07/30/22 1500   Temp 37.1 °C (98.7 °F) 07/30/22 1300   Pulse 90 07/30/22 1500   Resp 18 07/30/22 1500   SpO2 95 % 07/30/22 1500         Event Time   Out of Recovery 07/30/2022 10:18:00         Pain/Veronica Score: Pain Rating Prior to Med Admin: 6 (7/30/2022 10:02 AM)  Pain Rating Post Med Admin: 1 (7/30/2022 10:24 AM)  Veronica Score: 9 (7/30/2022 10:10 AM)

## 2022-07-30 NOTE — PROGRESS NOTES
[facility] Medicine  Progress Note    Patient Name: Chaim Johnson  MRN: [id number]  Patient Class: IP- Surgery Admit   Admission Date: 7/28/2022  Length of Stay: 2 days  Attending Physician: Tripp Palmer DO  Primary Care Provider: LANDON Evans        Subjective:     Principal Problem:Aftercare following surgery        HPI:  45 yo M underwent colostomy closure, MARIAM, appy and parasternal hernia repair today.  He is c/o pain.  He is about to have NGT placed to LIS.  He has ALEX but does not want a CPAP at this time q hs due to NGT and due to risk of N/V and aspiration this is probably a good idea.  Patient has had necrotizing fascitis of left thigh and has transmetarsal amputation of left foot with diabetic foot ulcer.  He follows in [place] for wound care with Rivas Stafford MD with [facility].  He is on full dose anticoagulation with eliquis which is being held at this time.  He does not know why he is on anticoagulation and I can find no record in hospital chart.      Patient had SMITH when he had alcoholic pancreatitis last year and follows with Dr. Flores and creat is now 1.3 and was previously 4.3.  He has been able to restart his glucophage and lisinopril.  Dr. Palmer is holding ACEI and CCB but continuing BB post op and monitoring BP closely.        Overview/Hospital Course:  07/29 Patient complains of abdominal pain. Pain pump in place. NGT in place. Accucheck 155.POD#1.  07/30 OR today for debridement of right foot ulcer. NGT in place.       Interval History: Patient voices no complaints    Review of Systems   Respiratory:  Negative for shortness of breath.    Cardiovascular:  Negative for chest pain.   Gastrointestinal:  Positive for abdominal distention.   Skin:  Positive for wound.   Objective:     Vital Signs (Most Recent):  Temp: 98.8 °F (37.1 °C) (07/30/22 0400)  Pulse: 91 (07/30/22 0400)  Resp: 19 (07/30/22 0400)  BP: (!) 142/91 (nurse  notified) (07/30/22 0400)  SpO2: 95 % (07/30/22 0400)   Vital Signs (24h Range):  Temp:  [98.3 °F (36.8 °C)-99.6 °F (37.6 °C)] 98.8 °F (37.1 °C)  Pulse:  [] 91  Resp:  [18-19] 19  SpO2:  [95 %-98 %] 95 %  BP: (139-167)/() 142/91     Weight: 121.1 kg (267 lb)  Body mass index is 35.23 kg/m².    Intake/Output Summary (Last 24 hours) at 7/30/2022 0817  Last data filed at 7/30/2022 0600  Gross per 24 hour   Intake 1243.33 ml   Output 2135 ml   Net -891.67 ml      Physical Exam  Vitals and nursing note reviewed.   Cardiovascular:      Rate and Rhythm: Regular rhythm.      Heart sounds: Normal heart sounds.   Pulmonary:      Breath sounds: Normal breath sounds.   Abdominal:      Comments: NGT   Skin:     Findings: Lesion present.   Neurological:      Mental Status: He is alert.       Significant Labs: All pertinent labs within the past 24 hours have been reviewed.  BMP:   Recent Labs   Lab 07/30/22  0336   GLU 86      K 3.8   *   CO2 21   BUN 12   CREATININE 1.18   CALCIUM 8.8   MG 1.6*     CBC:   Recent Labs   Lab 07/29/22  0355 07/30/22  0336   WBC 13.77* 13.78*   HGB 12.5* 10.7*   HCT 38.4* 33.8*    345     CMP:   Recent Labs   Lab 07/29/22  0355 07/30/22  0336    143   K 4.4 3.8   * 108*   CO2 23 21   * 86   BUN 13 12   CREATININE 0.96 1.18   CALCIUM 8.8 8.8   ANIONGAP 11 18*   EGFRNONAA 90 71     POCT Glucose: No results for input(s): POCTGLUCOSE in the last 48 hours.    Significant Imaging:  none      Assessment/Plan:      * Aftercare following surgery  Defer to primary team.        Diabetes mellitus  Patient's FSGs are controlled on current medication regimen.  Last A1c reviewed- No results found for: LABA1C, HGBA1C  Most recent fingerstick glucose reviewed- No results for input(s): POCTGLUCOSE in the last 24 hours.  Current correctional scale  Medium  Maintain anti-hyperglycemic dose as follows-   Antihyperglycemics (From admission, onward)            Start      Stop Route Frequency Ordered    07/28/22 2100  insulin detemir U-100 injection 15 Units         -- SubQ Nightly 07/28/22 1818    07/28/22 1917  insulin aspart U-100 injection 1-10 Units         -- SubQ Before meals & nightly PRN 07/28/22 1818    07/28/22 1754  insulin aspart U-100 injection 1-10 Units         -- SubQ Before meals & nightly PRN 07/28/22 1754        Hold Oral hypoglycemics while patient is in the hospital.  Continue basal insulin with correctional bolus.    Restart nutritional insulin if appropriate when tolerating PO.      ALEX (obstructive sleep apnea)  Holding CPAP until NGT discontinued and no longer aspiration risk.       Benign essential HTN  Holding ACE and CCB at present.  Restart when BP tolerates  Continue BB.         VTE Risk Mitigation (From admission, onward)         Ordered     enoxaparin injection 40 mg  Daily         07/28/22 1754     IP VTE HIGH RISK PATIENT  Once         07/28/22 1754     Place sequential compression device  Until discontinued         07/28/22 1754     Place sequential compression device  Until discontinued         07/28/22 1754                Discharge Planning   KASSIE:      Code Status: Full Code   Is the patient medically ready for discharge?:     Reason for patient still in hospital (select all that apply): Other (specify) OR today of right foot ulcer  Discharge Plan A: Home Health ([facility])                  Ishaan Franco MD  Department of Hospital Medicine   [facility]

## 2022-07-30 NOTE — OR NURSING
0940 Rec'd pt to PACU asleep but arousable to verbal stimuli. VSS. No signs of distress noted, respirations even and unlabored. Left foot dressing c/d/i. Pedal pulses 2+, cap refill approx. 4 seconds. Able to wiggle toes on command. NGT patent, secure, intact. C/o pain 8/10. See MAR for pain medication admin. Denies needs at this time. Will continue to monitor.     1010 Called and spoke with Elissa per patient request. Pt provided cell phone number. Update given and all questions answered.    1018 Out of PACU. VSS. No signs of bleeding/distress noted.     1030 Pt to room 460 awake and alert. No signs of distress noted, respirations even and unlabored. Moved to regular bed with safety precautions in place. No visitors at bedside. Bedside report given to GLORY Owen RN. Left foot dressing c/d/i. Pedal pulses 2+, cap refill 4 seconds, able to wiggle toes on command. NGT patent, secure, intact. Denies needs at this time. /90, P 97, R 14, O2 95% room air, T 97.9 oral.       Physical Therapy Evaluation    Visit Count: 1  Plan of Care: 6/20/2019 Through: 9/16/2019  Insurance Information: physical and speech therapy combined cap of $2040 per calendar year  Referred by: Kaden Coughlin MD; Next provider visit (if known/scheduled): to be scheduled  Medical Diagnosis (from order):   Diagnosis Information      Diagnosis    723.4 (ICD-9-CM) - M54.12 (ICD-10-CM) - Cervical radiculopathy    724.3 (ICD-9-CM) - M54.40 (ICD-10-CM) - Bilateral low back pain with sciatica, sciatica laterality unspecified, unspecified chronicity              Treatment Diagnosis: Lumbar and cervical symptoms with increased pain/symptoms, impaired posture, impaired strength, impaired range of motion, impaired mobility, impaired activity tolerance, impaired balance    Date of onset/injury: end of May 2019  Diagnosis Precautions: no lifting >15 lbs  Chart reviewed at time of initial evaluation (relevant co-morbidities, allergies, tests and medications listed): hypertension;    MRI 6/20/19: IMPRESSION:   1. Stable MRI of the lumbar spine.  2. Spondylosis and spondyloarthropathy results in mild central stenosis,  severe right foraminal stenosis, and moderate left foraminal stenosis at  L3-4.    SUBJECTIVE   Description of Problem and/or Mechanism of Injury: Back pain initially started May 2018; got morphine injections in [place] in neck and back and therapy as well 2x. Injections again in [place] in September. Then went home in [facility] and had injections again.   Current episode of symptoms started end of May; pain happened again when bending over; had Morphine injections(one in neck and one back). Increased pain with standing and walking. Very active before this happened. Had home physical therapy with exercises.  Pain:  #1 Back pain: intensity (0-10 scale): Current: 9; Pain Range (best-worst): 6-9  Location: left low back extending into buttock and thigh anteriorly and posteriorly, extending down lateral lower leg to  bottom of foot; burning on top of forefoot.  #2 Neck pain: current 7; pain range 7-9 Location left side of neck down arm to finger tips.  Quality/Description: Sharp, Shooting, Burning, Stiff, Tingling - left side of neck  Relieving/Alleviating factors: rest, heat, prescribed medication, over the counter medication, avoiding movement in the involved area Celebrex and Tylenol  Any tripping, stumbling, loss of balance: No  Any changes in bowel/bladder function: No  Pain with coughing and sneezing: Yes: in neck and low back  Any numbness/tingling or radiating pain: Yes: left side of neck  Night pain: No    Function:  Limitations/exacerbating factors: pain, difficulty, increased time to complete with ambulation limited to 10 minutes, bending/squatting/lifting,  activities, dressing, driving, grocery shopping, grooming/hygiene, house/yard work , lifting/carrying, meal prep/standing tasks, pushing/pulling, sitting limited to 60 minutes, sleep disturbed, standing limited immediately, stair ambulation  Prior level: independent with all activities of daily living and instrumental activities of daily living, pain free with all tasks involving lumbar/back use, sleep undisturbed  Patient Goal: no pain with activities of daily living     Prior Treatment: no therapies in the past year for current condition. Hospitalization, home health services or skilled nursing facility in the last 30 days: No, per patient.  Home Environment/Social Support: Patient lives with significant other; assistance as needed from family/friends available.    Safety:  Do you feel safe at home, work and/or school? yes, per patient  Patient denies 2 or more falls or an unexplained fall with injury in the last year, further testing not required     OBJECTIVE   Posture/Observation/Gait:  Right handed/right side dominant    Sitting: head forward 2 inches; trunk shift to the left  Standing: left shoulder elevated:       Lumbar Range of Motion (%)  Date  Initial    Flexion 50 mod central low back pain   Extension 25 mod central low back pain   Left Lateral Flexion 50% severe central low back pain    Right Lateral Flexion 50 severe central low back pain   Left Rotation 50 severe central low back pain and down left leg to toes   Right Rotation 50% severe central low back pain and down left leg to toes   standard testing positions unless otherwise noted; ranges are reported in active range of motion unless noted AA=active assistive range of motion and P=passive range of motion; * =pain  Only those motions that were assessed are noted.    Cervical Range of Motion (degrees)  Date Initial    Flexion (80-90) 46 mild left side neck pain   Extension (70) 38 moderate upper trapezius region pain   Lateral Flexion Left (20-45) 28 moderate left neck   Lateral Flexion Right (20-45) 22 mild right side neck pain   Rotation Left (70-90)  32 mod left side neck pain   Rotation Right (70-90)  40 mod right side neck pain   standard testing positions unless otherwise noted; ranges are reported in active range of motion unless noted AA=active assistive range of motion and P=passive range of motion; norms included in (); * =pain    Strength (out of 5)   Left Right   Date Initial Initial   Upper Trapezius (C4) 4+ 4+   Deltoids (C5) 4- severe left upper trap pain 4-   Shoulder External Rotators (C6/7) 4-* mild left upper arm pain 4_   Shoulder Internal Rotators (C6/7) 4+ mod left upper arm pain 4+   Biceps (C6) 4+ *moderate left side neck 5   Triceps (C7) 4-*mod left side neck pain 4-   standard testing positions unless otherwise noted; nerve root level included in ( ); * =pain  Only muscle strength that was assessed are noted.    Strength (out of 5)  Date Initial Initial   Abdominals See below    Trunk Extensors      Left Right   Hip Flexors (L2-3) 4+* mod low back 4+* mod low back pain   Hip Extensors (L4-5)     Hip Abductors (L4-5) 4+ 4+* mod low back pain   Hip Adductors (L2-3) 5 5   Hip  External Rotators (L4-5) 4+* min increased low back pain 4+* min increased low back pain   Hip Internal Rotators (L2-3) 4+* min increased low back pain 4+* min increased low back pain   Knee Extensors (L3-4) 5 5   Knee Flexors (L5-S1) 5 5   Ankle Plantar Flexors (S1-2)       Ankle Dorsiflexors (L4-L5)  5 5   Ankle Invertors (L4)     Ankle Evertors (L5-S1)     Extensor Hallucis Longus (L5) 5 5   standard testing positions unless otherwise noted, nerve root level included in ( ); *=pain  Only muscle strength that was assessed are noted.    Palpation:  Neck: Moderate pain left suboccipital, cps, posterior scalenes, upper trapezius and levator scapulae with moderate pressure.  Back: Severe tenderness to moderate pressure left lower lumbar paraspinal muscles L45; Posterior superior iliac spine, proximal sciatic notch with muscle hypertonicity; moderate bilateral gluteal hypertonicity Severe pain to moderate pressure left proximal latissimus dorsi    Special Tests:  Compression in sitting: neck region pain with compression; no radicular symptoms for cervical nerve root impingement  Distraction: in sitting: neck region pain with distraction; no radicular symptoms., in supine  Leg Length Discrepancy: In Supine: left leg short in supine ~1 inch  Passive Straight Leg Raise Test: Positive left and Negative right for nerve root impingement  90/90 Hamstring Test: left -72 degrees; right at -70 degrees for hamstring tightness  Vertical Compression Test:  neg for nerve root impingement; inefficient response with increased lumbar extension; fair minus core response  Elbow flexion test: inefficient response with increased trunk flexion-fair minus core respone  Lumbar protective mechanism: inefficient response; fair minus core activation     Sacroiliac Joint Compression: Positive bilaterally for sacroiliac instability/irritation   Sacroiliac Joint Distraction: Positive bilaterally for sacroiliac instability/irritation    Functional  Tests:   Single Leg Balance: Left: 4 seconds, Right 7 seconds (eyes open)    Outcome Measures:   Oswestry: 58% (0-20% = minimal disability; 20-40% = moderate disability; 40-60% = severe disability; 60-80% = crippled; % = bed bound)     Initial Treatment   Initial evaluation completed.    Therapeutic Exercise:   PREP(Pain Relieving Exercise Position):handout given  - Lie on your back with your knees bent 5-10 min every 2 hours.  Follow with 5 reps of knee to chest pulls on each leg, alternating sides.  - If you aggravate your symptoms, lie down w/in the first 30 seconds.    - Improve posture when transferring from sitting to standing; do not let your trunk lean forward.  Bend at hips and knees and not your trunk when standing up or sitting down.    - When lying down, go to your side first then roll over.  Getting back up, roll to your side and push up with your arms while you lower your legs off the bed.    Hooklying hamstring stretch 15 seconds/3 reps each side  Hooklying knee to chest 2-3 seconds/10 reps each side, alternating sides    Skilled input: verbal instruction/cues, tactile instruction/cues, posture correction, education/instruction on exercises    Home Program:  * above=instructed home program    Access Code: [email or web address]   URL: [email or web address]   Date: 06/24/2019   Prepared by: Mireya Gore     Exercises   Hooklying Hamstring Stretch - 3 reps - 1 sets - 15 hold - 2x daily - 7x weekly   Hooklying Single Knee to Chest - 10 reps - 1 sets - 1-2 hold - 2x daily - 7x weekly   Sitting to Supine Roll - 10 reps - 1 sets - 2x daily - 7x weekly     Writer verbally educated the patient and received verbal consent from the patient on hand placement, positioning of patient, and techniques to be performed today including therapist position for techniques, hand placement and palpation for techniques as described above and how they are pertinent to the patient's plan of care.     Suggestions for next  session as indicated: progress per plan of care, monitor effectiveness of exercises; educate with good posture/body mechanics with transfer training; muscle energy techniques for short left leg; piriformis stretching; manual therapy; modalities for pain. Assess hip flexor flexibilty  Exercises to include: posture; balance; core-flexion based; lower extremity flexibility.    ASSESSMENT   71 year old female patient has signs and symptoms consistent with low back pain(left) and left side neck pain that has reported functional limitations listed above. Patient also presents with left mid thoracic pain/proximal latissimus dorsi.    Patient will benefit from skilled therapy and rehab potential is fair due to positive factors motivation level, positive response/result of prior treatment and negative factors age, co-morbidities, time since onset of symptoms   Predicted patient presentation: High (unstable) - Patient comorbidities and complexities, as defined above, will have significant impact on steady progress for prescribed plan of care.    PLAN   Goals: To be obtained by end of this plan of care:  1. Patient will be independent with progressed and modified home exercise program   2. Decrease pain/symptoms to 0/10  3. Improve involved strength to 4+/5 without pain  4. Improve involved range of motion to within normal limits  5. Improve single leg stance balance to >10 seconds  through improvements listed above patient will:  6. Be able to complete lower body dressing without pain/difficulty  7. Demonstrate ability to negotiate level and unlevel surfaces at variable velocities, including change of direction without increased pain or instability to return to age appropriate and community activities at prior level of function  8. Be able to ascend and descend 1 flight of stairs using reciprocal pattern without pain/difficulty  9. Be able to complete low surface transfers without pain/difficulty  10. Be able to sit for 60  minutes without pain/difficulty to improve activities of independent daily living  11. Be able to stand for 60 minutes without pain/difficulty to improve activities of independent daily living  12. Be able to bend/squat without pain/difficulty to improve activities of independent daily living    The following skilled interventions to be implemented to achieve above:  Activities of Daily Living/Self Care (93489)  Gait Training (55020)  Manual Therapy (80961)  Neuromuscular Re-Education (75925)  Therapeutic Activity (57075)  Therapeutic Exercise (60318)  Electrical Stimulation (87483//43330)   Heat/Cold (99633)  Ultrasound/Phonophoresis (07426)    Frequency/Duration: 2-3 times per week for up to 12 weeks with tapering as the patient progresses.    patient involved in and agreed to plan of care and goals.   Attendance policy provided at time of evaluation.     Patient Education:   Who will be receiving education: patient  Are they ready to learn: yes  Preferred learning style: written, verbal, demonstration  Barriers to learning: no barriers apparent at this time   Result of initial outlined education: Verbalizes understanding, Demonstrates understanding and Needs reinforcement    THERAPY DAILY BILLING   Insurance: 1. MEDICARE 2. [facility]    Evaluation Procedures:  Physical Therapy Evaluation: High Complexity    Timed Procedures:  Therapeutic Exercise, 10 minutes    Untimed Procedures:  No untimed codes were used on this date of service    Total Treatment Time: 75 minutes    The referring provider's electronic or written signature on the evaluation authorizes the therapy plan of care and certifies the need for these services, furnished under this plan of care while under their care.  Electronically sent for referring provider signature

## 2022-07-30 NOTE — ANESTHESIA PROCEDURE NOTES
Intubation    Date/Time: 7/30/2022 8:23 AM  Performed by: Henry Cooper CRNA  Authorized by: Remberto Salazar MD     Intubation:     Induction:  Intravenous    Intubated:  Postinduction    Mask Ventilation:  Easy mask    Attempts:  1    Attempted By:  CRNA    Method of Intubation:  Direct    Blade:  Jhonatan 3    Laryngeal View Grade: Grade IIb - only the arytenoids and epiglottis seen      Difficult Airway Encountered?: No      Complications:  None    Airway Device:  Oral endotracheal tube    Airway Device Size:  7.5    Style/Cuff Inflation:  Cuffed (inflated to minimal occlusive pressure)    Inflation Amount (mL):  7    Tube secured:  22    Secured at:  The lips    Placement Verified By:  Capnometry    Complicating Factors:  None    Findings Post-Intubation:  BS equal bilateral and atraumatic/condition of teeth unchanged

## 2022-07-31 LAB
ANION GAP SERPL CALCULATED.3IONS-SCNC: 14 MMOL/L (ref 7–16)
BASOPHILS # BLD AUTO: 0.03 K/UL (ref 0–0.2)
BASOPHILS NFR BLD AUTO: 0.2 % (ref 0–1)
BUN SERPL-MCNC: 12 MG/DL (ref 7–18)
BUN/CREAT SERPL: 8 (ref 6–20)
CALCIUM SERPL-MCNC: 8.8 MG/DL (ref 8.5–10.1)
CHLORIDE SERPL-SCNC: 112 MMOL/L (ref 98–107)
CO2 SERPL-SCNC: 22 MMOL/L (ref 21–32)
CREAT SERPL-MCNC: 1.53 MG/DL (ref 0.7–1.3)
DIFFERENTIAL METHOD BLD: ABNORMAL
EOSINOPHIL # BLD AUTO: 0.37 K/UL (ref 0–0.5)
EOSINOPHIL NFR BLD AUTO: 3.1 % (ref 1–4)
ERYTHROCYTE [DISTWIDTH] IN BLOOD BY AUTOMATED COUNT: 14.5 % (ref 11.5–14.5)
GLUCOSE SERPL-MCNC: 131 MG/DL (ref 74–106)
GLUCOSE SERPL-MCNC: 175 MG/DL (ref 70–105)
GLUCOSE SERPL-MCNC: 182 MG/DL (ref 70–105)
GLUCOSE SERPL-MCNC: 202 MG/DL (ref 70–105)
GLUCOSE SERPL-MCNC: 218 MG/DL (ref 70–105)
HCT VFR BLD AUTO: 30.1 % (ref 40–54)
HGB BLD-MCNC: 9.9 G/DL (ref 13.5–18)
IMM GRANULOCYTES # BLD AUTO: 0.05 K/UL (ref 0–0.04)
IMM GRANULOCYTES NFR BLD: 0.4 % (ref 0–0.4)
LYMPHOCYTES # BLD AUTO: 1.58 K/UL (ref 1–4.8)
LYMPHOCYTES NFR BLD AUTO: 13.1 % (ref 27–41)
MAGNESIUM SERPL-MCNC: 1.8 MG/DL (ref 1.7–2.3)
MCH RBC QN AUTO: 30.7 PG (ref 27–31)
MCHC RBC AUTO-ENTMCNC: 32.9 G/DL (ref 32–36)
MCV RBC AUTO: 93.5 FL (ref 80–96)
MONOCYTES # BLD AUTO: 0.8 K/UL (ref 0–0.8)
MONOCYTES NFR BLD AUTO: 6.6 % (ref 2–6)
MPC BLD CALC-MCNC: 9.9 FL (ref 9.4–12.4)
NEUTROPHILS # BLD AUTO: 9.23 K/UL (ref 1.8–7.7)
NEUTROPHILS NFR BLD AUTO: 76.6 % (ref 53–65)
NRBC # BLD AUTO: 0 X10E3/UL
NRBC, AUTO (.00): 0 %
PHOSPHATE SERPL-MCNC: 3.4 MG/DL (ref 2.5–4.5)
PLATELET # BLD AUTO: 334 K/UL (ref 150–400)
POTASSIUM SERPL-SCNC: 3.6 MMOL/L (ref 3.5–5.1)
RBC # BLD AUTO: 3.22 M/UL (ref 4.6–6.2)
SODIUM SERPL-SCNC: 144 MMOL/L (ref 136–145)
VANCOMYCIN TROUGH SERPL-MCNC: 35.1 ΜG/ML (ref 10–20)
WBC # BLD AUTO: 12.06 K/UL (ref 4.5–11)

## 2022-07-31 PROCEDURE — 84100 ASSAY OF PHOSPHORUS: CPT | Performed by: STUDENT IN AN ORGANIZED HEALTH CARE EDUCATION/TRAINING PROGRAM

## 2022-07-31 PROCEDURE — 99232 SBSQ HOSP IP/OBS MODERATE 35: CPT | Mod: ,,, | Performed by: FAMILY MEDICINE

## 2022-07-31 PROCEDURE — 11000001 HC ACUTE MED/SURG PRIVATE ROOM

## 2022-07-31 PROCEDURE — C9113 INJ PANTOPRAZOLE SODIUM, VIA: HCPCS | Performed by: STUDENT IN AN ORGANIZED HEALTH CARE EDUCATION/TRAINING PROGRAM

## 2022-07-31 PROCEDURE — 99232 PR SUBSEQUENT HOSPITAL CARE,LEVL II: ICD-10-PCS | Mod: ,,, | Performed by: FAMILY MEDICINE

## 2022-07-31 PROCEDURE — 25000003 PHARM REV CODE 250: Performed by: STUDENT IN AN ORGANIZED HEALTH CARE EDUCATION/TRAINING PROGRAM

## 2022-07-31 PROCEDURE — 83735 ASSAY OF MAGNESIUM: CPT | Performed by: STUDENT IN AN ORGANIZED HEALTH CARE EDUCATION/TRAINING PROGRAM

## 2022-07-31 PROCEDURE — C9399 UNCLASSIFIED DRUGS OR BIOLOG: HCPCS | Performed by: STUDENT IN AN ORGANIZED HEALTH CARE EDUCATION/TRAINING PROGRAM

## 2022-07-31 PROCEDURE — 80202 ASSAY OF VANCOMYCIN: CPT | Performed by: STUDENT IN AN ORGANIZED HEALTH CARE EDUCATION/TRAINING PROGRAM

## 2022-07-31 PROCEDURE — 63600175 PHARM REV CODE 636 W HCPCS: Performed by: STUDENT IN AN ORGANIZED HEALTH CARE EDUCATION/TRAINING PROGRAM

## 2022-07-31 PROCEDURE — 85025 COMPLETE CBC W/AUTO DIFF WBC: CPT | Performed by: STUDENT IN AN ORGANIZED HEALTH CARE EDUCATION/TRAINING PROGRAM

## 2022-07-31 PROCEDURE — 80048 BASIC METABOLIC PNL TOTAL CA: CPT | Performed by: STUDENT IN AN ORGANIZED HEALTH CARE EDUCATION/TRAINING PROGRAM

## 2022-07-31 PROCEDURE — 99900035 HC TECH TIME PER 15 MIN (STAT)

## 2022-07-31 PROCEDURE — 82962 GLUCOSE BLOOD TEST: CPT

## 2022-07-31 PROCEDURE — 36415 COLL VENOUS BLD VENIPUNCTURE: CPT | Performed by: STUDENT IN AN ORGANIZED HEALTH CARE EDUCATION/TRAINING PROGRAM

## 2022-07-31 RX ADMIN — VANCOMYCIN HYDROCHLORIDE 2000 MG: 5 INJECTION, POWDER, LYOPHILIZED, FOR SOLUTION INTRAVENOUS at 09:07

## 2022-07-31 RX ADMIN — PIPERACILLIN SODIUM AND TAZOBACTAM SODIUM 4.5 G: 4; .5 INJECTION, POWDER, LYOPHILIZED, FOR SOLUTION INTRAVENOUS at 06:07

## 2022-07-31 RX ADMIN — ENOXAPARIN SODIUM 40 MG: 40 INJECTION SUBCUTANEOUS at 05:07

## 2022-07-31 RX ADMIN — PIPERACILLIN SODIUM AND TAZOBACTAM SODIUM 4.5 G: 4; .5 INJECTION, POWDER, LYOPHILIZED, FOR SOLUTION INTRAVENOUS at 09:07

## 2022-07-31 RX ADMIN — METOPROLOL TARTRATE 5 MG: 5 INJECTION, SOLUTION INTRAVENOUS at 12:07

## 2022-07-31 RX ADMIN — METOPROLOL TARTRATE 5 MG: 5 INJECTION, SOLUTION INTRAVENOUS at 11:07

## 2022-07-31 RX ADMIN — INSULIN ASPART 4 UNITS: 100 INJECTION, SOLUTION INTRAVENOUS; SUBCUTANEOUS at 05:07

## 2022-07-31 RX ADMIN — INSULIN ASPART 2 UNITS: 100 INJECTION, SOLUTION INTRAVENOUS; SUBCUTANEOUS at 08:07

## 2022-07-31 RX ADMIN — SODIUM CHLORIDE, POTASSIUM CHLORIDE, SODIUM LACTATE AND CALCIUM CHLORIDE 1000 ML: 600; 310; 30; 20 INJECTION, SOLUTION INTRAVENOUS at 08:07

## 2022-07-31 RX ADMIN — PANTOPRAZOLE SODIUM 40 MG: 40 INJECTION, POWDER, FOR SOLUTION INTRAVENOUS at 08:07

## 2022-07-31 RX ADMIN — METOPROLOL TARTRATE 5 MG: 5 INJECTION, SOLUTION INTRAVENOUS at 01:07

## 2022-07-31 RX ADMIN — METOPROLOL TARTRATE 5 MG: 5 INJECTION, SOLUTION INTRAVENOUS at 05:07

## 2022-07-31 RX ADMIN — Medication: at 01:07

## 2022-07-31 RX ADMIN — SODIUM CHLORIDE, POTASSIUM CHLORIDE, SODIUM LACTATE AND CALCIUM CHLORIDE: 600; 310; 30; 20 INJECTION, SOLUTION INTRAVENOUS at 11:07

## 2022-07-31 RX ADMIN — VANCOMYCIN HYDROCHLORIDE 2500 MG: 5 INJECTION, POWDER, LYOPHILIZED, FOR SOLUTION INTRAVENOUS at 02:07

## 2022-07-31 RX ADMIN — INSULIN ASPART 2 UNITS: 100 INJECTION, SOLUTION INTRAVENOUS; SUBCUTANEOUS at 09:07

## 2022-07-31 RX ADMIN — PIPERACILLIN SODIUM AND TAZOBACTAM SODIUM 4.5 G: 4; .5 INJECTION, POWDER, LYOPHILIZED, FOR SOLUTION INTRAVENOUS at 01:07

## 2022-07-31 RX ADMIN — METOPROLOL TARTRATE 5 MG: 5 INJECTION, SOLUTION INTRAVENOUS at 06:07

## 2022-07-31 RX ADMIN — INSULIN DETEMIR 15 UNITS: 100 INJECTION, SOLUTION SUBCUTANEOUS at 09:07

## 2022-07-31 NOTE — SUBJECTIVE & OBJECTIVE
Interval History: Stable, no acute events overnight. NG tube came out yesterday; no gas or stool    Medications:  Continuous Infusions:   hydromorphone in 0.9 % NaCl 6 mg/30 ml      lactated ringers 100 mL/hr at 07/30/22 2122     Scheduled Meds:   enoxaparin  40 mg Subcutaneous Daily    insulin detemir U-100  15 Units Subcutaneous QHS    lactated ringers  1,000 mL Intravenous Once    metoprolol  5 mg Intravenous Q6H    pantoprazole  40 mg Intravenous Daily    piperacillin-tazobactam (ZOSYN) IVPB  4.5 g Intravenous Q8H    sodium hypochlorite   Irrigation Once     PRN Meds:dextrose 50%, dextrose 50%, dextrose 50%, dextrose 50%, diphenhydrAMINE, glucagon (human recombinant), glucagon (human recombinant), glucose, glucose, glucose, glucose, hydrALAZINE, insulin aspart U-100, insulin aspart U-100, melatonin, naloxone, ondansetron, ondansetron, prochlorperazine, sodium chloride 0.9%, vancomycin - pharmacy to dose     Review of patient's allergies indicates:  No Known Allergies  Objective:     Vital Signs (Most Recent):  Temp: 97.5 °F (36.4 °C) (07/31/22 0729)  Pulse: 79 (07/31/22 0729)  Resp: 17 (07/31/22 0729)  BP: (!) 159/96 (07/31/22 0729)  SpO2: 95 % (07/31/22 0729)   Vital Signs (24h Range):  Temp:  [97.2 °F (36.2 °C)-99.3 °F (37.4 °C)] 97.5 °F (36.4 °C)  Pulse:  [] 79  Resp:  [12-19] 17  SpO2:  [94 %-100 %] 95 %  BP: (133-164)/(80-96) 159/96     Weight: 121.1 kg (267 lb)  Body mass index is 35.23 kg/m².    Intake/Output - Last 3 Shifts         07/29 0700 07/30 0659 07/30 0700 07/31 0659 07/31 0700 08/01 0659    P.O. 0      I.V. (mL/kg) 1000 (8.3) 100 (0.8)     IV Piggyback 243.3 100     Total Intake(mL/kg) 1243.3 (10.3) 200 (1.7)     Urine (mL/kg/hr) 600 (0.2) 2400 (0.8)     Drains 2085 15     Blood       Total Output 8775 7995     Net -1441.7 -0831                    Physical Exam  Constitutional:       General: He is not in acute distress.     Appearance: Normal appearance.   HENT:      Head:  Stop metoprolol/HCTZ  Start atenolol 25 mg daily  Start lasix 20 mg once daily  Call clinic in 2 weeks to assess response to lasix     Normocephalic.   Cardiovascular:      Rate and Rhythm: Normal rate.   Pulmonary:      Effort: Pulmonary effort is normal. No respiratory distress.   Abdominal:      General: There is no distension.      Tenderness: There is no abdominal tenderness.      Comments: Abdominal binder in place; LEE dressing with moderate shadowing; Wound vac in place. ABELINO drain serosanguinous   Musculoskeletal:         General: Normal range of motion.        Feet:    Feet:      Comments: Right foot plantar callus; no signs of infection.    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      Coloration: Skin is not jaundiced.   Neurological:      General: No focal deficit present.      Mental Status: He is alert and oriented to person, place, and time.      Cranial Nerves: No cranial nerve deficit.       Significant Labs:  I have reviewed all pertinent lab results within the past 24 hours.  CBC:   Recent Labs   Lab 07/31/22  0141   WBC 12.06*   RBC 3.22*   HGB 9.9*   HCT 30.1*      MCV 93.5   MCH 30.7   MCHC 32.9     BMP:   Recent Labs   Lab 07/31/22  0141   *      K 3.6   *   CO2 22   BUN 12   CREATININE 1.53*   CALCIUM 8.8   MG 1.8       Significant Diagnostics:  I have reviewed all pertinent imaging results/findings within the past 24 hours.

## 2022-07-31 NOTE — NURSING
0729 pt lying in bed, awake alert and oriented. No distress noted. IV infusing as ordered. PCA pump present. Pt denies pain or needs at this time. Wound vac and ABELINO drain intact. Left pt in bed, call bell in reach, safety measures in place.     1117 pt resting in bed, watching TV, no distress, denies needs. IV infusing as ordered. Call bell in reach.    1309 pt resting in bed, no distress. All lines/ equipment intact. IV infusing as ordered. meds given, see MAR. Call bell in reach, safety measures in place.     1515 pt resting in bed, no distress. IV Infusing as ordered. Dressing changed to left foot slight amount of yellow and bloody drainage on dressing. Pt tolerated well. Left pt in bed with safety measures in place.     1729 pt resting in bed, meds given see MAR. ABELINO emptied 10cc. No distress noted. IV pole moved to other side of bed per patient request.  No other needs voiced. Call bell in reach. Safety measures in place.

## 2022-07-31 NOTE — PROGRESS NOTES
[facility] - Orthopedic  General Surgery  Progress Note    Subjective:     History of Present Illness:  No notes on file    Post-Op Info:  Procedure(s) (LRB):  DEBRIDEMENT, LOWER EXTREMITY (Left)   1 Day Post-Op     Interval History: Stable, no acute events overnight. NG tube came out yesterday; no gas or stool    Medications:  Continuous Infusions:   hydromorphone in 0.9 % NaCl 6 mg/30 ml      lactated ringers 100 mL/hr at 07/30/22 2122     Scheduled Meds:   enoxaparin  40 mg Subcutaneous Daily    insulin detemir U-100  15 Units Subcutaneous QHS    lactated ringers  1,000 mL Intravenous Once    metoprolol  5 mg Intravenous Q6H    pantoprazole  40 mg Intravenous Daily    piperacillin-tazobactam (ZOSYN) IVPB  4.5 g Intravenous Q8H    sodium hypochlorite   Irrigation Once     PRN Meds:dextrose 50%, dextrose 50%, dextrose 50%, dextrose 50%, diphenhydrAMINE, glucagon (human recombinant), glucagon (human recombinant), glucose, glucose, glucose, glucose, hydrALAZINE, insulin aspart U-100, insulin aspart U-100, melatonin, naloxone, ondansetron, ondansetron, prochlorperazine, sodium chloride 0.9%, vancomycin - pharmacy to dose     Review of patient's allergies indicates:  No Known Allergies  Objective:     Vital Signs (Most Recent):  Temp: 97.5 °F (36.4 °C) (07/31/22 0729)  Pulse: 79 (07/31/22 0729)  Resp: 17 (07/31/22 0729)  BP: (!) 159/96 (07/31/22 0729)  SpO2: 95 % (07/31/22 0729)   Vital Signs (24h Range):  Temp:  [97.2 °F (36.2 °C)-99.3 °F (37.4 °C)] 97.5 °F (36.4 °C)  Pulse:  [] 79  Resp:  [12-19] 17  SpO2:  [94 %-100 %] 95 %  BP: (133-164)/(80-96) 159/96     Weight: 121.1 kg (267 lb)  Body mass index is 35.23 kg/m².    Intake/Output - Last 3 Shifts         07/29 0700 07/30 0659 07/30 0700 07/31 0659 07/31 0700 08/01 0659    P.O. 0      I.V. (mL/kg) 1000 (8.3) 100 (0.8)     IV Piggyback 243.3 100     Total Intake(mL/kg) 1243.3 (10.3) 200 (1.7)     Urine (mL/kg/hr) 600 (0.2) 2400 (0.8)     Drains  2085 15     Blood       Total Output 2685 2415     Net -1448.7 2217                    Physical Exam  Constitutional:       General: He is not in acute distress.     Appearance: Normal appearance.   HENT:      Head: Normocephalic.   Cardiovascular:      Rate and Rhythm: Normal rate.   Pulmonary:      Effort: Pulmonary effort is normal. No respiratory distress.   Abdominal:      General: There is no distension.      Tenderness: There is no abdominal tenderness.      Comments: Abdominal binder in place; LEE dressing with moderate shadowing; Wound vac in place. ABELINO drain serosanguinous   Musculoskeletal:         General: Normal range of motion.        Feet:    Feet:      Comments: Right foot plantar callus; no signs of infection.    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      Coloration: Skin is not jaundiced.   Neurological:      General: No focal deficit present.      Mental Status: He is alert and oriented to person, place, and time.      Cranial Nerves: No cranial nerve deficit.       Significant Labs:  I have reviewed all pertinent lab results within the past 24 hours.  CBC:   Recent Labs   Lab 07/31/22  0141   WBC 12.06*   RBC 3.22*   HGB 9.9*   HCT 30.1*      MCV 93.5   MCH 30.7   MCHC 32.9     BMP:   Recent Labs   Lab 07/31/22  0141   *      K 3.6   *   CO2 22   BUN 12   CREATININE 1.53*   CALCIUM 8.8   MG 1.8       Significant Diagnostics:  I have reviewed all pertinent imaging results/findings within the past 24 hours.    Assessment/Plan:     Parastomal hernia  07/27/2022 colostomy  takedown  Open repair of parastomal hernia. NWPT LLQ with seal  LEE midline with seal, NGT high output, await ileus, /hr add PPI, PT has patient up to chair    7/31: stable; pain better  controlled. Awaiting bowel function; NG tube accidentally removed last night, ok to leave out for now. Ambulate    Diabetic foot infection  07/29/2022 lateral  left foot wound gangrenous with abscess purelent drainage expressed  malodorous  Start vanc/zosyn npo after midnight for drainage debridement    7/31: s/p debridement. Await cultures; continue antibiotics        Tripp Reno, DO  General Surgery  [facility] - Orthopedic

## 2022-07-31 NOTE — ASSESSMENT & PLAN NOTE
07/27/2022 colostomy  takedown  Open repair of parastomal hernia. NWPT LLQ with seal  LEE midline with seal, NGT high output, await ileus, /hr add PPI, PT has patient up to chair    7/31: stable; pain better controlled. Awaiting bowel function; NG tube accidentally removed last night, ok to leave out for now. Ambulate

## 2022-07-31 NOTE — SUBJECTIVE & OBJECTIVE
Interval History: Patient without complaints.     Review of Systems   Constitutional:  Negative for fatigue and fever.   Respiratory:  Negative for shortness of breath and wheezing.    Cardiovascular:  Negative for chest pain.   Gastrointestinal:  Positive for abdominal distention.   Objective:     Vital Signs (Most Recent):  Temp: 97.5 °F (36.4 °C) (07/31/22 0729)  Pulse: 79 (07/31/22 0729)  Resp: 17 (07/31/22 0729)  BP: (!) 159/96 (07/31/22 0729)  SpO2: 95 % (07/31/22 0729)   Vital Signs (24h Range):  Temp:  [97.2 °F (36.2 °C)-99.3 °F (37.4 °C)] 97.5 °F (36.4 °C)  Pulse:  [] 79  Resp:  [12-19] 17  SpO2:  [94 %-100 %] 95 %  BP: (133-164)/(80-96) 159/96     Weight: 121.1 kg (267 lb)  Body mass index is 35.23 kg/m².    Intake/Output Summary (Last 24 hours) at 7/31/2022 0829  Last data filed at 7/31/2022 0400  Gross per 24 hour   Intake 200 ml   Output 2415 ml   Net -2215 ml      Physical Exam  Vitals and nursing note reviewed.   Constitutional:       Appearance: Normal appearance.   Cardiovascular:      Rate and Rhythm: Regular rhythm.      Heart sounds: Normal heart sounds.   Pulmonary:      Breath sounds: Normal breath sounds.   Abdominal:      General: There is distension.      Palpations: Abdomen is soft.   Skin:     Findings: Lesion present.   Neurological:      Mental Status: He is alert.       Significant Labs: All pertinent labs within the past 24 hours have been reviewed.  BMP:   Recent Labs   Lab 07/31/22 0141   *      K 3.6   *   CO2 22   BUN 12   CREATININE 1.53*   CALCIUM 8.8   MG 1.8     CBC:   Recent Labs   Lab 07/30/22 0336 07/31/22 0141   WBC 13.78* 12.06*   HGB 10.7* 9.9*   HCT 33.8* 30.1*    334     CMP:   Recent Labs   Lab 07/30/22 0336 07/31/22 0141    144   K 3.8 3.6   * 112*   CO2 21 22   GLU 86 131*   BUN 12 12   CREATININE 1.18 1.53*   CALCIUM 8.8 8.8   ANIONGAP 18* 14   EGFRNONAA 71 53*     POCT Glucose: No results for input(s): POCTGLUCOSE in  the last 48 hours.    Significant Imaging:  none

## 2022-07-31 NOTE — ASSESSMENT & PLAN NOTE
07/29/2022 lateral  left foot wound gangrenous with abscess purelent drainage expressed  malodorous  Start vanc/zosyn npo after midnight for drainage debridement    7/31: s/p debridement. Await cultures; continue antibiotics

## 2022-07-31 NOTE — PLAN OF CARE
Problem: Infection  Goal: Absence of Infection Signs and Symptoms  Outcome: Ongoing, Progressing     Problem: Fluid and Electrolyte Imbalance (Acute Kidney Injury/Impairment)  Goal: Fluid and Electrolyte Balance  Outcome: Ongoing, Progressing     Problem: Oral Intake Inadequate (Acute Kidney Injury/Impairment)  Goal: Optimal Nutrition Intake  Outcome: Ongoing, Progressing     Problem: Renal Function Impairment (Acute Kidney Injury/Impairment)  Goal: Effective Renal Function  Outcome: Ongoing, Progressing     Problem: Adult Inpatient Plan of Care  Goal: Plan of Care Review  Outcome: Ongoing, Progressing  Goal: Patient-Specific Goal (Individualized)  Outcome: Ongoing, Progressing  Goal: Absence of Hospital-Acquired Illness or Injury  Outcome: Ongoing, Progressing  Goal: Optimal Comfort and Wellbeing  Outcome: Ongoing, Progressing  Goal: Readiness for Transition of Care  Outcome: Ongoing, Progressing     Problem: Diabetes Comorbidity  Goal: Blood Glucose Level Within Targeted Range  Outcome: Ongoing, Progressing     Problem: Bariatric Environmental Safety  Goal: Safety Maintained with Care  Outcome: Ongoing, Progressing     Problem: Impaired Wound Healing  Goal: Optimal Wound Healing  Outcome: Ongoing, Progressing     Problem: Fall Injury Risk  Goal: Absence of Fall and Fall-Related Injury  Outcome: Ongoing, Progressing     Problem: Skin Injury Risk Increased  Goal: Skin Health and Integrity  Outcome: Ongoing, Progressing

## 2022-07-31 NOTE — PROGRESS NOTES
[facility] Medicine  Progress Note    Patient Name: Chaim Johnson  MRN: [id number]  Patient Class: IP- Surgery Admit   Admission Date: 7/28/2022  Length of Stay: 3 days  Attending Physician: Tripp Palmer DO  Primary Care Provider: LANDON Evans        Subjective:     Principal Problem:Aftercare following surgery        HPI:  47 yo M underwent colostomy closure, MARIAM, appy and parasternal hernia repair today.  He is c/o pain.  He is about to have NGT placed to LIS.  He has ALEX but does not want a CPAP at this time q hs due to NGT and due to risk of N/V and aspiration this is probably a good idea.  Patient has had necrotizing fascitis of left thigh and has transmetarsal amputation of left foot with diabetic foot ulcer.  He follows in [place] for wound care with Rivas Stafford MD with [facility].  He is on full dose anticoagulation with eliquis which is being held at this time.  He does not know why he is on anticoagulation and I can find no record in hospital chart.      Patient had SMITH when he had alcoholic pancreatitis last year and follows with Dr. Flores and creat is now 1.3 and was previously 4.3.  He has been able to restart his glucophage and lisinopril.  Dr. Palmer is holding ACEI and CCB but continuing BB post op and monitoring BP closely.        Overview/Hospital Course:  07/29 Patient complains of abdominal pain. Pain pump in place. NGT in place. Accucheck 155.POD#1.  07/30 OR today for debridement of right foot ulcer. NGT in place.   07/31 Patient voices no complaints. NG removed. Wound care. Accuchek 134      Interval History: Patient without complaints.     Review of Systems   Constitutional:  Negative for fatigue and fever.   Respiratory:  Negative for shortness of breath and wheezing.    Cardiovascular:  Negative for chest pain.   Gastrointestinal:  Positive for abdominal distention.   Objective:     Vital Signs (Most Recent):  Temp: 97.5 °F  (36.4 °C) (07/31/22 0729)  Pulse: 79 (07/31/22 0729)  Resp: 17 (07/31/22 0729)  BP: (!) 159/96 (07/31/22 0729)  SpO2: 95 % (07/31/22 0729)   Vital Signs (24h Range):  Temp:  [97.2 °F (36.2 °C)-99.3 °F (37.4 °C)] 97.5 °F (36.4 °C)  Pulse:  [] 79  Resp:  [12-19] 17  SpO2:  [94 %-100 %] 95 %  BP: (133-164)/(80-96) 159/96     Weight: 121.1 kg (267 lb)  Body mass index is 35.23 kg/m².    Intake/Output Summary (Last 24 hours) at 7/31/2022 0829  Last data filed at 7/31/2022 0400  Gross per 24 hour   Intake 200 ml   Output 2415 ml   Net -2215 ml      Physical Exam  Vitals and nursing note reviewed.   Constitutional:       Appearance: Normal appearance.   Cardiovascular:      Rate and Rhythm: Regular rhythm.      Heart sounds: Normal heart sounds.   Pulmonary:      Breath sounds: Normal breath sounds.   Abdominal:      General: There is distension.      Palpations: Abdomen is soft.   Skin:     Findings: Lesion present.   Neurological:      Mental Status: He is alert.       Significant Labs: All pertinent labs within the past 24 hours have been reviewed.  BMP:   Recent Labs   Lab 07/31/22 0141   *      K 3.6   *   CO2 22   BUN 12   CREATININE 1.53*   CALCIUM 8.8   MG 1.8     CBC:   Recent Labs   Lab 07/30/22  0336 07/31/22 0141   WBC 13.78* 12.06*   HGB 10.7* 9.9*   HCT 33.8* 30.1*    334     CMP:   Recent Labs   Lab 07/30/22  0336 07/31/22 0141    144   K 3.8 3.6   * 112*   CO2 21 22   GLU 86 131*   BUN 12 12   CREATININE 1.18 1.53*   CALCIUM 8.8 8.8   ANIONGAP 18* 14   EGFRNONAA 71 53*     POCT Glucose: No results for input(s): POCTGLUCOSE in the last 48 hours.    Significant Imaging:  none      Assessment/Plan:      * Aftercare following surgery  Defer to primary team.        Diabetes mellitus  Patient's FSGs are controlled on current medication regimen.  Last A1c reviewed- No results found for: LABA1C, HGBA1C  Most recent fingerstick glucose reviewed- No results for input(s):  POCTGLUCOSE in the last 24 hours.  Current correctional scale  Medium  Maintain anti-hyperglycemic dose as follows-   Antihyperglycemics (From admission, onward)            Start     Stop Route Frequency Ordered    07/28/22 2100  insulin detemir U-100 injection 15 Units         -- SubQ Nightly 07/28/22 1818 07/28/22 1917  insulin aspart U-100 injection 1-10 Units         -- SubQ Before meals & nightly PRN 07/28/22 1818    07/28/22 1754  insulin aspart U-100 injection 1-10 Units         -- SubQ Before meals & nightly PRN 07/28/22 1754        Hold Oral hypoglycemics while patient is in the hospital.  Continue basal insulin with correctional bolus.    Restart nutritional insulin if appropriate when tolerating PO.      ALEX (obstructive sleep apnea)  Holding CPAP until NGT discontinued and no longer aspiration risk.       Benign essential HTN  Holding ACE and CCB at present.  Restart when BP tolerates  Continue BB.         VTE Risk Mitigation (From admission, onward)         Ordered     enoxaparin injection 40 mg  Daily         07/28/22 1754     IP VTE HIGH RISK PATIENT  Once         07/28/22 1754     Place sequential compression device  Until discontinued         07/28/22 1754     Place sequential compression device  Until discontinued         07/28/22 1754                Discharge Planning   KASSIE:      Code Status: Full Code   Is the patient medically ready for discharge?:     Reason for patient still in hospital (select all that apply): Other (specify) Wound care  Discharge Plan A: Home Health ([facility])                  Ishaan Franco MD  Department of Hospital Medicine   [facility] - Orthopedic

## 2022-08-01 LAB
ANION GAP SERPL CALCULATED.3IONS-SCNC: 14 MMOL/L (ref 7–16)
BASOPHILS # BLD AUTO: 0.02 K/UL (ref 0–0.2)
BASOPHILS NFR BLD AUTO: 0.2 % (ref 0–1)
BUN SERPL-MCNC: 10 MG/DL (ref 7–18)
BUN/CREAT SERPL: 7 (ref 6–20)
CALCIUM SERPL-MCNC: 8.8 MG/DL (ref 8.5–10.1)
CHLORIDE SERPL-SCNC: 113 MMOL/L (ref 98–107)
CK SERPL-CCNC: 29 U/L (ref 39–308)
CO2 SERPL-SCNC: 22 MMOL/L (ref 21–32)
CREAT SERPL-MCNC: 1.5 MG/DL (ref 0.7–1.3)
CRP SERPL-MCNC: 14.9 MG/DL (ref 0–0.8)
DIFFERENTIAL METHOD BLD: ABNORMAL
EOSINOPHIL # BLD AUTO: 0.42 K/UL (ref 0–0.5)
EOSINOPHIL NFR BLD AUTO: 4.8 % (ref 1–4)
ERYTHROCYTE [DISTWIDTH] IN BLOOD BY AUTOMATED COUNT: 14.4 % (ref 11.5–14.5)
ERYTHROCYTE [SEDIMENTATION RATE] IN BLOOD BY WESTERGREN METHOD: 104 MM/HR (ref 0–15)
GLUCOSE SERPL-MCNC: 103 MG/DL (ref 70–105)
GLUCOSE SERPL-MCNC: 106 MG/DL (ref 74–106)
GLUCOSE SERPL-MCNC: 149 MG/DL (ref 70–105)
GLUCOSE SERPL-MCNC: 282 MG/DL (ref 70–105)
GLUCOSE SERPL-MCNC: 337 MG/DL (ref 70–105)
HCT VFR BLD AUTO: 31.5 % (ref 40–54)
HGB BLD-MCNC: 10 G/DL (ref 13.5–18)
IMM GRANULOCYTES # BLD AUTO: 0.05 K/UL (ref 0–0.04)
IMM GRANULOCYTES NFR BLD: 0.6 % (ref 0–0.4)
LYMPHOCYTES # BLD AUTO: 1.49 K/UL (ref 1–4.8)
LYMPHOCYTES NFR BLD AUTO: 17 % (ref 27–41)
MAGNESIUM SERPL-MCNC: 2.1 MG/DL (ref 1.7–2.3)
MCH RBC QN AUTO: 29.9 PG (ref 27–31)
MCHC RBC AUTO-ENTMCNC: 31.7 G/DL (ref 32–36)
MCV RBC AUTO: 94.3 FL (ref 80–96)
MONOCYTES # BLD AUTO: 0.67 K/UL (ref 0–0.8)
MONOCYTES NFR BLD AUTO: 7.7 % (ref 2–6)
MPC BLD CALC-MCNC: 10.2 FL (ref 9.4–12.4)
NEUTROPHILS # BLD AUTO: 6.1 K/UL (ref 1.8–7.7)
NEUTROPHILS NFR BLD AUTO: 69.7 % (ref 53–65)
NRBC # BLD AUTO: 0 X10E3/UL
NRBC, AUTO (.00): 0 %
PHOSPHATE SERPL-MCNC: 3.6 MG/DL (ref 2.5–4.5)
PLATELET # BLD AUTO: 306 K/UL (ref 150–400)
POTASSIUM SERPL-SCNC: 3.9 MMOL/L (ref 3.5–5.1)
RBC # BLD AUTO: 3.34 M/UL (ref 4.6–6.2)
SODIUM SERPL-SCNC: 145 MMOL/L (ref 136–145)
VANCOMYCIN SERPL-MCNC: 34.8 ΜG/ML (ref 0–20)
WBC # BLD AUTO: 8.75 K/UL (ref 4.5–11)

## 2022-08-01 PROCEDURE — 87040 BLOOD CULTURE FOR BACTERIA: CPT | Performed by: INTERNAL MEDICINE

## 2022-08-01 PROCEDURE — 25000003 PHARM REV CODE 250: Performed by: STUDENT IN AN ORGANIZED HEALTH CARE EDUCATION/TRAINING PROGRAM

## 2022-08-01 PROCEDURE — 82550 ASSAY OF CK (CPK): CPT | Performed by: INTERNAL MEDICINE

## 2022-08-01 PROCEDURE — 99232 PR SUBSEQUENT HOSPITAL CARE,LEVL II: ICD-10-PCS | Mod: ,,, | Performed by: FAMILY MEDICINE

## 2022-08-01 PROCEDURE — 82962 GLUCOSE BLOOD TEST: CPT

## 2022-08-01 PROCEDURE — C9113 INJ PANTOPRAZOLE SODIUM, VIA: HCPCS | Performed by: STUDENT IN AN ORGANIZED HEALTH CARE EDUCATION/TRAINING PROGRAM

## 2022-08-01 PROCEDURE — 84100 ASSAY OF PHOSPHORUS: CPT | Performed by: STUDENT IN AN ORGANIZED HEALTH CARE EDUCATION/TRAINING PROGRAM

## 2022-08-01 PROCEDURE — 63600175 PHARM REV CODE 636 W HCPCS: Performed by: STUDENT IN AN ORGANIZED HEALTH CARE EDUCATION/TRAINING PROGRAM

## 2022-08-01 PROCEDURE — 85651 RBC SED RATE NONAUTOMATED: CPT | Performed by: INTERNAL MEDICINE

## 2022-08-01 PROCEDURE — 86140 C-REACTIVE PROTEIN: CPT | Performed by: INTERNAL MEDICINE

## 2022-08-01 PROCEDURE — 25000003 PHARM REV CODE 250: Performed by: NURSE PRACTITIONER

## 2022-08-01 PROCEDURE — 99223 PR INITIAL HOSPITAL CARE,LEVL III: ICD-10-PCS | Mod: ,,, | Performed by: INTERNAL MEDICINE

## 2022-08-01 PROCEDURE — 36415 COLL VENOUS BLD VENIPUNCTURE: CPT | Performed by: INTERNAL MEDICINE

## 2022-08-01 PROCEDURE — 99232 SBSQ HOSP IP/OBS MODERATE 35: CPT | Mod: ,,, | Performed by: FAMILY MEDICINE

## 2022-08-01 PROCEDURE — 11000001 HC ACUTE MED/SURG PRIVATE ROOM

## 2022-08-01 PROCEDURE — 99223 1ST HOSP IP/OBS HIGH 75: CPT | Mod: ,,, | Performed by: INTERNAL MEDICINE

## 2022-08-01 PROCEDURE — 83735 ASSAY OF MAGNESIUM: CPT | Performed by: STUDENT IN AN ORGANIZED HEALTH CARE EDUCATION/TRAINING PROGRAM

## 2022-08-01 PROCEDURE — 85025 COMPLETE CBC W/AUTO DIFF WBC: CPT | Performed by: STUDENT IN AN ORGANIZED HEALTH CARE EDUCATION/TRAINING PROGRAM

## 2022-08-01 PROCEDURE — 80202 ASSAY OF VANCOMYCIN: CPT | Performed by: STUDENT IN AN ORGANIZED HEALTH CARE EDUCATION/TRAINING PROGRAM

## 2022-08-01 PROCEDURE — 25000003 PHARM REV CODE 250: Performed by: HOSPITALIST

## 2022-08-01 PROCEDURE — 99900035 HC TECH TIME PER 15 MIN (STAT)

## 2022-08-01 PROCEDURE — 80048 BASIC METABOLIC PNL TOTAL CA: CPT | Performed by: STUDENT IN AN ORGANIZED HEALTH CARE EDUCATION/TRAINING PROGRAM

## 2022-08-01 PROCEDURE — 94761 N-INVAS EAR/PLS OXIMETRY MLT: CPT

## 2022-08-01 RX ORDER — HYDROCODONE BITARTRATE AND ACETAMINOPHEN 7.5; 325 MG/1; MG/1
1 TABLET ORAL EVERY 6 HOURS PRN
Status: DISCONTINUED | OUTPATIENT
Start: 2022-08-01 | End: 2022-08-05 | Stop reason: HOSPADM

## 2022-08-01 RX ORDER — GABAPENTIN 300 MG/1
300 CAPSULE ORAL 2 TIMES DAILY
Status: DISCONTINUED | OUTPATIENT
Start: 2022-08-01 | End: 2022-08-05 | Stop reason: HOSPADM

## 2022-08-01 RX ORDER — CARVEDILOL 25 MG/1
25 TABLET ORAL 2 TIMES DAILY WITH MEALS
Status: DISCONTINUED | OUTPATIENT
Start: 2022-08-01 | End: 2022-08-05 | Stop reason: HOSPADM

## 2022-08-01 RX ORDER — LISINOPRIL 10 MG/1
10 TABLET ORAL DAILY
Status: DISCONTINUED | OUTPATIENT
Start: 2022-08-01 | End: 2022-08-03

## 2022-08-01 RX ORDER — AMLODIPINE BESYLATE 5 MG/1
5 TABLET ORAL DAILY
Status: DISCONTINUED | OUTPATIENT
Start: 2022-08-01 | End: 2022-08-03

## 2022-08-01 RX ADMIN — GABAPENTIN 300 MG: 300 CAPSULE ORAL at 09:08

## 2022-08-01 RX ADMIN — METOPROLOL TARTRATE 5 MG: 5 INJECTION, SOLUTION INTRAVENOUS at 06:08

## 2022-08-01 RX ADMIN — PANTOPRAZOLE SODIUM 40 MG: 40 INJECTION, POWDER, FOR SOLUTION INTRAVENOUS at 10:08

## 2022-08-01 RX ADMIN — CARVEDILOL 25 MG: 25 TABLET, FILM COATED ORAL at 03:08

## 2022-08-01 RX ADMIN — VANCOMYCIN HYDROCHLORIDE 2000 MG: 5 INJECTION, POWDER, LYOPHILIZED, FOR SOLUTION INTRAVENOUS at 09:08

## 2022-08-01 RX ADMIN — PIPERACILLIN SODIUM AND TAZOBACTAM SODIUM 4.5 G: 4; .5 INJECTION, POWDER, LYOPHILIZED, FOR SOLUTION INTRAVENOUS at 03:08

## 2022-08-01 RX ADMIN — Medication: at 03:08

## 2022-08-01 RX ADMIN — INSULIN ASPART 8 UNITS: 100 INJECTION, SOLUTION INTRAVENOUS; SUBCUTANEOUS at 05:08

## 2022-08-01 RX ADMIN — AMLODIPINE BESYLATE 5 MG: 5 TABLET ORAL at 03:08

## 2022-08-01 RX ADMIN — PIPERACILLIN SODIUM AND TAZOBACTAM SODIUM 4.5 G: 4; .5 INJECTION, POWDER, LYOPHILIZED, FOR SOLUTION INTRAVENOUS at 06:08

## 2022-08-01 RX ADMIN — PIPERACILLIN SODIUM AND TAZOBACTAM SODIUM 4.5 G: 4; .5 INJECTION, POWDER, LYOPHILIZED, FOR SOLUTION INTRAVENOUS at 09:08

## 2022-08-01 RX ADMIN — SODIUM CHLORIDE, POTASSIUM CHLORIDE, SODIUM LACTATE AND CALCIUM CHLORIDE: 600; 310; 30; 20 INJECTION, SOLUTION INTRAVENOUS at 03:08

## 2022-08-01 RX ADMIN — LISINOPRIL 10 MG: 10 TABLET ORAL at 03:08

## 2022-08-01 RX ADMIN — ENOXAPARIN SODIUM 40 MG: 40 INJECTION SUBCUTANEOUS at 05:08

## 2022-08-01 RX ADMIN — INSULIN ASPART 6 UNITS: 100 INJECTION, SOLUTION INTRAVENOUS; SUBCUTANEOUS at 12:08

## 2022-08-01 RX ADMIN — HYDROCODONE BITARTRATE AND ACETAMINOPHEN 1 TABLET: 7.5; 325 TABLET ORAL at 05:08

## 2022-08-01 RX ADMIN — VANCOMYCIN HYDROCHLORIDE 2000 MG: 5 INJECTION, POWDER, LYOPHILIZED, FOR SOLUTION INTRAVENOUS at 12:08

## 2022-08-01 NOTE — PROGRESS NOTES
The vancomycin random level that came back this morning was drawn after dose was given.  No changes at this time. Will continue current regimen and check trough.

## 2022-08-01 NOTE — CONSULTS
INFECTIOUS DISEASE CONSULT NOTE   Admit Date: 7/28/2022  CONSULT FOR :  Diabetic foot infection  Chief Complaint:  Aftercare following surgery    HPI:      46-year-old man with longstanding diabetes has had a chronic wound to his left foot for quite some time.  He has had several toes amputated.  Had necrotizing fasciitis to perineum end of last year requiring diverting colostomy.  That wound he says healed well since earlier this year but diverting colostomy closure was deferred until after he completed an 8 week course of antibiotic therapy for left foot osteomyelitis.  He said he was treated by an ID doctor at [facility] with vancomycin for 8 weeks.  The treatment was completed about a month ago.  Came in to hospital on this occasion for reversal of the colostomy.  Since being in the hospital the left foot wound was noted to have malodorous purulent drainage.  It was debrided yesterday and bone culture is positive for 4 different organisms.  I am asked to assist with antibiotic management he is currently on vancomycin and Zosyn.  He has not had any fever or other constitutional symptoms.    ROS:      Constitutional: no fever or chills, no mental status changes   Eyes: no visual changes. No redness or jaundice, no blurred vision   Neck: Normal range of motion. No neck stiffiness   ENT: no nasal congestion or sore throat, no nose bleeds, no ear ache   Respiratory: no cough or shortness of breath, no wheezing, no stridor   Cardiovascular: no chest pain or palpitations  Gastrointestinal: no nausea / vomiting / diarrhea, mild abdominal pain her related to recent surgery, no constipation   Genitourinary: no hematuria or dysuria, no frequency or urgency   Hematologic/Lymphatic: no easy bruising or lymphadenopathy   Musculoskeletal: no arthralgias or myalgias, no weakness   Neurological: no seizures or tremors, no  loss of consciousness  Behavioral/Psych: no auditory or visual hallucinations,  normal mood   Endocrine: no heat or cold intolerance   All 14 systems reviewed and negative except as noted above.    PMHx:      Past Medical History:   Diagnosis Date    Chronic kidney disease, stage 3a     Diabetes mellitus     Hypertension     Renal disorder     Sleep apnea     Toe ulcer 12/06/2021        PMSx:      Past Surgical History:   Procedure Laterality Date    APPENDECTOMY N/A 7/28/2022    Procedure: APPENDECTOMY;  Surgeon: Tripp Palmer DO;  [facility];  Service: General;  Laterality: N/A;    APPLICATION OF WOUND VACUUM-ASSISTED CLOSURE DEVICE N/A 7/28/2022    Procedure: APPLICATION, WOUND VAC;  Surgeon: Tripp Palmer DO;  [facility];  Service: General;  Laterality: N/A;    COLONOSCOPY N/A 7/28/2022    Procedure: COLONOSCOPY;  Surgeon: Tripp Palmer DO;  [facility];  Service: General;  Laterality: N/A;    INCISION AND DRAINAGE OF ABSCESS Left 11/12/2021    Procedure: INCISION AND DRAINAGE, Left buttock and groin ABSCESS;  Surgeon: Tripp Palmer DO;  [facility];  Service: General;  Laterality: Left;  PACKING PLACED    INSERTION OF DIALYSIS CATHETER N/A 11/18/2021    Procedure: INSERTION, CATHETER, DIALYSIS;  Surgeon: Tripp Palmer DO;  [facility];  Service: General;  Laterality: N/A;    LYSIS OF ADHESIONS N/A 7/28/2022    Procedure: LYSIS, ADHESIONS;  Surgeon: Tripp Palmer DO;  [facility];  Service: General;  Laterality: N/A;    REPAIR, HERNIA, PARASTOMAL N/A 7/28/2022    Procedure: REPAIR, HERNIA, PARASTOMAL;  Surgeon: Tripp Palmer DO;  [facility];  Service: General;  Laterality: N/A;    REVISION COLOSTOMY N/A 11/24/2021    Procedure: ATTEMPTED REVISION COLOSTOMY;  Surgeon: Dov Almaguer MD;  [facility];  Service: General;  Laterality: N/A;    TOE AMPUTATION Right 11/12/2021    Procedure: AMPUTATION RIGHT 2ND TOE WITH WOUND DEBRIDEMENT @RIGHT GREAT TOE AMPUTATION SITE;   Surgeon: Tripp Palmer DO;  Location: [facility] OR;  Service: General;  Laterality: Right;    WOUND DEBRIDEMENT Left 11/14/2021    Procedure: DEBRIDEMENT, Buttock WOUND;  Surgeon: Tripp Palmer DO;  Location: [facility] OR;  Service: General;  Laterality: Left;    WOUND DEBRIDEMENT Left 11/18/2021    Procedure: DEBRIDEMENT, WOUND on LEFT BUTTOCK;  Surgeon: Tripp Palmer DO;  Location: [facility] OR;  Service: General;  Laterality: Left;    WOUND DEBRIDEMENT Left 11/22/2021    Procedure: DEBRIDEMENT, WOUND;  Surgeon: Dov Almaguer MD;  Location: [facility] OR;  Service: General;  Laterality: Left;  left groin, perineum, buttocks,upper thigh        Social Hx:      Social History     Socioeconomic History    Marital status: Single        Family Hx:      History reviewed. No pertinent family history. `  Review of patient's allergies indicates:  No Known Allergies    Medications:      Current Facility-Administered Medications   Medication    dextrose 50% injection 12.5 g    dextrose 50% injection 12.5 g    dextrose 50% injection 25 g    dextrose 50% injection 25 g    diphenhydrAMINE capsule 50 mg    enoxaparin injection 40 mg    glucagon (human recombinant) injection 1 mg    glucagon (human recombinant) injection 1 mg    glucose chewable tablet 16 g    glucose chewable tablet 16 g    glucose chewable tablet 24 g    glucose chewable tablet 24 g    hydrALAZINE injection 10 mg    HYDROcodone-acetaminophen 7.5-325 mg per tablet 1 tablet    insulin aspart U-100 injection 1-10 Units    insulin aspart U-100 injection 1-10 Units    insulin detemir U-100 injection 15 Units    lactated ringers infusion    melatonin tablet 6 mg    metoprolol injection 5 mg    naloxone 0.4 mg/mL injection 0.02 mg    ondansetron disintegrating tablet 8 mg    ondansetron injection 4 mg    pantoprazole injection 40 mg    piperacillin-tazobactam (ZOSYN) 4.5 g in dextrose 5 % in water (D5W) 5 % 100 mL IVPB (MB+)     prochlorperazine injection Soln 5 mg    sodium chloride 0.9% flush 10 mL    sodium hypochlorite (DAKIN'S) external solution 0.25%    vancomycin (VANCOCIN) 2,000 mg in dextrose 5 % 500 mL IVPB    vancomycin - pharmacy to dose       VITALS:      Vital Signs Range (Last 24H):  Temp:  [97.4 °F (36.3 °C)-98.8 °F (37.1 °C)]   Pulse:  [71-92]   Resp:  [16-18]   BP: (150-183)/()   SpO2:  [96 %-99 %]     I & O (Last 24H):    Intake/Output Summary (Last 24 hours) at 8/1/2022 1312  Last data filed at 8/1/2022 0624  Gross per 24 hour   Intake 2300 ml   Output 2695 ml   Net -395 ml       Physical Exam   Constitutional: Pt is oriented to person, place, and time. Appears well-developed and well-nourished.   Head: Normocephalic and atraumatic.   Eyes, nose and throat:  Pale moist mucosa, anicteric and acyanotic, WANG, no oral or pharyngeal exudates  Neck: Neck supple, no cervical lymphadenopathy, no thyroid gland enlargement   Cardiovascular: Normal rate and regular rhythm.  S1 and S2 appreciated by ascultation, no murmurs  Pulmonary/Chest: Effort normal, no crepitations or wheezes auscultated   Abdomen:  Wound VAC present with colostomy was, ABELINO drains putting out serosanguineous fluid, not distended, normal bowel sounds. Soft.  No significant tenderness.  Neurological: Pt is alert and oriented to person, place, and time.  No obvious focal deficits.  Extremities:  Mild right leg edema.  Left 3rd to 5th toes amputated and there is an open slough you wound to the amputation stumps, also another wound to lateral aspect of 5th metatarsal head with sloughing drainage.  Skin: Warm and dry. No rashes.  Psychiatric:  Normal mood and affect, judgment normal    Laboratory Data:  Reviewed and noted in plan where applicable- Please see chart for full laboratory data.    No results for input(s): CPK, CPKMB, TROPONINI, MB in the last 24 hours. No results for input(s): POCTGLUCOSE in the last 24 hours.     No results found for: INR,  PROTIME    Lab Results   Component Value Date    WBC 8.75 08/01/2022    HGB 10.0 (L) 08/01/2022    HCT 31.5 (L) 08/01/2022    MCV 94.3 08/01/2022     08/01/2022       BMP  Recent Labs   Lab 08/01/22  0309         K 3.9   *   CO2 22   BUN 10   CREATININE 1.50*   CALCIUM 8.8   MG 2.1     Microbiology Results (last 7 days)     Procedure Component Value Units Date/Time    Blood culture [[id number]]     Order Status: Sent Specimen: Blood     Blood culture [[id number]]     Order Status: Sent Specimen: Blood     Culture, Bone [[id number]]  (Abnormal) Collected: 07/30/22 1234    Order Status: Completed Specimen: Bone from Foot, Left Updated: 08/01/22 1045     Culture, Bone Heavy Growth Gram-negative Bacilli     Comment: Identification and Susceptibility to Follow         Heavy Growth Enterococcus species     Comment: Identification and Susceptibility to Follow         Heavy Growth Gram-negative Bacilli     Comment: Identification and Susceptibility to Follow         Heavy Growth Gram-negative Bacilli     Comment: Identification and Susceptibility to Follow       Culture, Wound [[id number]]  (Abnormal) Collected: 07/30/22 1234    Order Status: Completed Specimen: Wound from Foot, Left Updated: 08/01/22 1032     Culture, Wound/Abscess Moderate Growth Gram-negative Bacilli     Comment: Identification and Susceptibility to Follow         Heavy Growth Enterococcus species     Comment: Identification and Susceptibility to Follow         Moderate Growth Gram-negative Bacilli     Comment: Identification and Susceptibility to Follow         Moderate Growth Gram-negative Bacilli     Comment: Identification and Susceptibility to Follow       Culture, Anaerobe [[id number]] Collected: 07/30/22 1234    Order Status: Resulted Specimen: Wound from Foot, Left Updated: 07/30/22 1235          Radiology:  Reviewed and noted in plan where applicable- Please see chart for full radiology data.      ASSESSMENT/PLAN:     ACTIVE  PROBLEMS:    Patient Active Problem List   Diagnosis    Necrotizing fasciitis    Benign essential HTN    ALEX (obstructive sleep apnea)    Acute renal failure superimposed on stage 3a chronic kidney disease    Diabetes mellitus    Aftercare following surgery    Diabetic foot infection    Parastomal hernia       ASSESSMENT:  1. 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  He failed conservative (medical management with 8 weeks IV antibiotics) done about 2 months ago.  2. Diabetes, somewhat uncontrolled with an A1c of 7.6%, but improved from earlier this year when was almost 10%.  3. Mild renal insufficiency, relatively stable on vancomycin      PLAN:  1. Agree with empiric vancomycin and Zosyn  2. Follow-up finalized wound cultures and deescalate antibiotic therapy as able.  He will need 6 weeks plus antibiotic therapy.   3. Blood cultures x2 sets for completeness sake  4. Get baseline CRP and ESR, along with CPK in case we have to give him daptomycin  5. Aggressive local wound care.  May need repeat debridement.    Thank you very much for the consult.  Will follow.

## 2022-08-01 NOTE — ASSESSMENT & PLAN NOTE
07/29/2022 lateral  left foot wound gangrenous with abscess purelent drainage expressed  malodorous  Start vanc/zosyn npo after midnight for drainage debridement    7/31: s/p debridement. Await cultures; continue antibiotics    08/01/2022 LLQ wound  7x5x3 cm per dr mckinney in OR, ostomy take down wound vac with seal, +flatus, advance to full liquids diet, francisco serous 20cc,   Left foot wound cx GNB, enterococcus, awiat final cultures, consult ID, for possible long term iv abx, ostemyelitis,

## 2022-08-01 NOTE — NURSING
CONTACTED PHARMACY REGARDING PT'S VANC LEVEL OF 34.8. PHARMACIST STATED THE LAB WAS DRAWN TOO EARLY AND TO DISREGARD THAT VALUE.

## 2022-08-01 NOTE — ASSESSMENT & PLAN NOTE
Patient's FSGs are controlled on current medication regimen.  Last A1c reviewed-   Lab Results   Component Value Date    HGBA1C 7.6 (H) 07/28/2022     Most recent fingerstick glucose reviewed- No results for input(s): POCTGLUCOSE in the last 24 hours.  Current correctional scale  Medium  Maintain anti-hyperglycemic dose as follows-   Antihyperglycemics (From admission, onward)            Start     Stop Route Frequency Ordered    07/28/22 2100  insulin detemir U-100 injection 15 Units         -- SubQ Nightly 07/28/22 1818 07/28/22 1917  insulin aspart U-100 injection 1-10 Units         -- SubQ Before meals & nightly PRN 07/28/22 1818    07/28/22 1754  insulin aspart U-100 injection 1-10 Units         -- SubQ Before meals & nightly PRN 07/28/22 1754        Hold Oral hypoglycemics while patient is in the hospital.  Continue basal insulin with correctional bolus.    Restart nutritional insulin if appropriate when tolerating PO.

## 2022-08-01 NOTE — PROGRESS NOTES
[facility] - Orthopedic  Wound Care    Patient Name:  Chaim Johnson   MRN:  [id number]  Date: 8/1/2022  Diagnosis: Aftercare following surgery    History:     Past Medical History:   Diagnosis Date    Chronic kidney disease, stage 3a     Diabetes mellitus     Hypertension     Renal disorder     Sleep apnea     Toe ulcer 12/06/2021       Social History     Socioeconomic History    Marital status: Single       Precautions:     Allergies as of 07/05/2022    (No Known Allergies)       WOC Assessment Details/Treatment      08/01/22 1630   WOCN Assessment   WOCN Total Time (mins) 120   Visit Date 08/01/22   Visit Time 1630   Consult Type New   WOCN Speciality Wound   WOCN List wound vac   Wound surgical;neuropathic   Number of Wounds 8   Procedure wound vac   Intervention assessed;changed;applied;chart review;orders   Teaching on-going;complication;discharge   Skin Interventions   Dehiscence Prevention/Management abdominal binder secured in place   Device Skin Pressure Protection absorbent pad utilized/changed;adhesive use limited;positioning supports utilized;pressure points protected   Pressure Reduction Devices foam padding utilized;pressure-redistributing mattress utilized   Pressure Reduction Techniques frequent weight shift encouraged;heels elevated off bed;positioned off wounds   Skin Protection adhesive use limited;incontinence pads utilized;skin sealant/moisture barrier applied;tubing/devices free from skin contact   Positioning   Body Position position changed independently   Head of Bed (HOB) Positioning HOB at 30 degrees   Positioning/Transfer Devices pillows;in use   Pressure Injury Prevention    Check Moisture Management Pad Done   Sacral Foam Dressing   (n/a)   Elevate Heels Technique Pillow   Check Medical Devices Done        Altered Skin Integrity 08/01/22 1824 Left lateral Foot #1 Diabetic Ulcer   Date First Assessed/Time First Assessed: 08/01/22 1824   Altered Skin Integrity Present on  Admission: yes  Side: Left  Orientation: lateral  Location: Foot  Wound Number: #1  Primary Wound Type: Diabetic Ulcer   Wound Image       Dressing Appearance Open to air   Drainage Amount Small   Drainage Characteristics/Odor Serosanguineous;No odor   Appearance Yellow;Fibrin;Red;Moist   Tissue loss description Full thickness   Red (%), Wound Tissue Color 80 %   Yellow (%), Wound Tissue Color 20 %   Periwound Area Intact;Dry   Wound Edges Defined;Open   Huff Classification (diabetic foot ulcers only) Grade 4   Wound Length (cm) 5 cm   Wound Width (cm) 3.2 cm   Wound Depth (cm) 0.6 cm   Wound Volume (cm^3) 9.6 cm^3   Wound Surface Area (cm^2) 16 cm^2   Care Cleansed with:;Antimicrobial agent   Dressing Applied;Hydrogel;Gauze;Rolled gauze   Periwound Care Absorptive dressing applied;Dry periwound area maintained;Skin barrier film applied   Dressing Change Due 08/02/22        Altered Skin Integrity 08/01/22 1825 Left lateral Foot #2 Diabetic Ulcer   Date First Assessed/Time First Assessed: 08/01/22 1825   Altered Skin Integrity Present on Admission: yes  Side: Left  Orientation: lateral  Location: Foot  Wound Number: #2  Primary Wound Type: Diabetic Ulcer   Wound Image       Dressing Appearance Open to air   Drainage Amount Small   Drainage Characteristics/Odor Yellow;Serosanguineous;No odor   Appearance Red;Yellow;Dry;Fibrin;Not granulating   Tissue loss description Full thickness   Red (%), Wound Tissue Color 20 %   Yellow (%), Wound Tissue Color 80 %   Periwound Area Intact;Dry   Wound Edges Defined;Open   Wound Length (cm) 6 cm   Wound Width (cm) 1.2 cm   Wound Depth (cm) 0.3 cm   Wound Volume (cm^3) 2.16 cm^3   Wound Surface Area (cm^2) 7.2 cm^2   Care Cleansed with:;Antimicrobial agent   Dressing Applied;Hydrogel;Gauze;Rolled gauze   Periwound Care Absorptive dressing applied;Skin barrier film applied   Dressing Change Due 08/02/22        Altered Skin Integrity 08/01/22 1826 Right plantar Foot Diabetic Ulcer    Date First Assessed/Time First Assessed: 08/01/22 1826   Altered Skin Integrity Present on Admission: yes  Side: Right  Orientation: plantar  Location: Foot  Primary Wound Type: Diabetic Ulcer   Wound Image     Dressing Appearance Open to air   Drainage Amount Small   Drainage Characteristics/Odor Serosanguineous;No odor   Appearance Red;Moist   Tissue loss description Full thickness   Red (%), Wound Tissue Color 100 %   Periwound Area Intact;Dry   Wound Edges Defined;Open;Callused   Huff Classification (diabetic foot ulcers only) Grade 2   Wound Length (cm) 1 cm   Wound Width (cm) 0.7 cm   Wound Depth (cm) 0.3 cm   Wound Volume (cm^3) 0.21 cm^3   Wound Surface Area (cm^2) 0.7 cm^2   Care Cleansed with:;Antimicrobial agent   Dressing Applied;Hydrogel;Gauze;Rolled gauze   Periwound Care Absorptive dressing applied;Skin barrier film applied   Dressing Change Due 08/02/22        Altered Skin Integrity 08/01/22 1826 Left Toe, first Diabetic Ulcer   Date First Assessed/Time First Assessed: 08/01/22 1826   Altered Skin Integrity Present on Admission: yes  Side: Left  Location: Toe, first  Primary Wound Type: Diabetic Ulcer   Wound Image    Dressing Appearance Moist drainage   Drainage Amount Scant   Drainage Characteristics/Odor Serosanguineous;No odor   Appearance Red;Fibrin   Tissue loss description Partial thickness   Red (%), Wound Tissue Color 100 %   Periwound Area Intact;Dry   Wound Edges Irregular;Open   Huff Classification (diabetic foot ulcers only) Grade 1   Wound Length (cm) 0.7 cm   Wound Width (cm) 1.2 cm   Wound Depth (cm) 0.2 cm   Wound Volume (cm^3) 0.168 cm^3   Wound Surface Area (cm^2) 0.84 cm^2   Care Cleansed with:;Antimicrobial agent   Dressing Applied;Hydrogel;Gauze;Rolled gauze   Periwound Care Absorptive dressing applied;Skin barrier film applied   Dressing Change Due 08/02/22        Altered Skin Integrity 08/01/22 1826 Left Toe, second Diabetic Ulcer   Date First Assessed/Time First  Assessed: 08/01/22 1826   Altered Skin Integrity Present on Admission: yes  Side: Left  Location: Toe, second  Primary Wound Type: Diabetic Ulcer   Wound Image     Dressing Appearance Open to air   Drainage Amount Scant   Drainage Characteristics/Odor Yellow;Tan;No odor   Appearance Dry;Fibrin   Tissue loss description Not applicable   Periwound Area Dry   Wound Edges Callused;Irregular   Wound Length (cm) 0.6 cm   Wound Width (cm) 0.5 cm   Wound Depth (cm) 0 cm   Wound Volume (cm^3) 0 cm^3   Wound Surface Area (cm^2) 0.3 cm^2   Care Cleansed with:;Antimicrobial agent   Dressing Applied;Hydrogel;Gauze;Rolled gauze   Periwound Care Absorptive dressing applied;Skin barrier film applied   Dressing Change Due 08/02/22        Incision/Site 07/28/22 1636 Abdomen   Date First Assessed/Time First Assessed: 07/28/22 1636   Location: Abdomen   Wound Image    Incision WDL WDL   Dressing Appearance Moist drainage   Drainage Amount Moderate   Drainage Characteristics/Odor Serosanguineous;Bleeding controlled;Brown   Appearance Intact;Moist   Periwound Area Intact;Dry   Wound Edges Approximated   Wound Length (cm) 18.5 cm   Care Cleansed with:;Wound cleanser;Applied:;Skin Barrier   Dressing Applied;Other (comment)  (LEE)   Periwound Care Absorptive dressing applied;Skin barrier film applied   Dressing Change Due 08/04/22        Negative Pressure Wound Therapy  07/28/22 1200 midline   Placement Date/Time: 07/28/22 1200   Orientation: midline  Location: Abdomen   NPWT Type Incision Management  (LEE)   Therapy Setting NPWT Continuous therapy   Pressure Setting NPWT 120 mmHg   Therapy Interventions NPWT Dressing changed;Seal intact        Negative Pressure Wound Therapy  07/28/22 1200 Left   Placement Date/Time: 07/28/22 1200   Side: Left  Location: Lower quadrant   NPWT Type Vacuum Therapy   Therapy Setting NPWT Continuous therapy   Pressure Setting NPWT 125 mmHg   Therapy Interventions NPWT Dressing changed;Seal intact;Trac pad  replaced   Sponges Inserted NPWT Black;1;Other  (1 curad noncontact layer then black foam)   Sponges Removed NPWT Black;1      Consulted to assist with NPWT management & foot wounds. See above findings. Patient with mulitple neuropathic ulcers to bilateral feet. Small amount of drainage noted.     Recommend:  Bilateral feet diabetic ulcers: DAILY  1. Cleanse all with soap and water  2. Irrigate all with vashe  3. Periwound: pat dry. Spray no sting barrier spray and allow to air dry.  4. Apply multidex gel to gauze and place over wound beds.   5. Wrap in kerlix/rolled gauze.    NPWT dsg change to LLQ reversed stoma site: Nurses to do on Thursdays. Wound care nurse to change on Mondays. *unless otherwise indicated.  1. Cut wound vac off & remove old dressing using adhesive release spray.  2. Cleanse wound well with wound cleanser.  3. Periwound: pat dry. Spray no sting barrier spray and allow to air dry. Then cut and apply Cohesive Eaken (located on 4N) to periwound edges.  4. Apply non-adherent contact layer (ex. Adaptic/Curad - located on all floors) to open wound bed: especially if exposed bone, tendon, ligament or high pain level upon removing black foam.  5. Cut black foam (not over wound bed) to fit open wound bed. If tunneled/undermined areas: lightly pack with Kerlix OR white foam and leave tail to where it is easily visualized to remove upon next dressing change. Then cover exposed wound bed with black foam. Ensure kerlix/white foam are touching the black foam.  6. Cover with transparent film.  7. Cut small hole in transparent film for track pad. Then place track pad over this area.  18. Connect the track pad to the tubing & cut wound vac back on. Ensure black foam collapses. Pressure may need to be applied to dressing to assist in it collapsing.  *tubing and canister to be changed weekly on Tuesday's.  *download [employer] NPWT lizzie for further step by step videos and troubleshooting.   *If other wound vac being  used - download that brand's lizzie, visit their website, or review video for step by step instructions on dressing application, troubleshooting, etc.    Patient states that he goes every Tuesday to the wound care clinic near him. I encouraged him to keep these appts as well as notify them of his new wounds that he was not aware of - in between his 1st & 2nd toe on his left foot as well as to the plantar surface of his right foot.    Discharge instructions placed on the chart as well.    I also recommend consultation to  for obtaining special fitted diabetic shoes. Patient states he has an old one for his left foot that is worn out. Patient may benefit from referral orthotics for special fitting shoes also.    Recommendations made to primary team for above . Orders placed for topical wound care therapy.     Wound care will cont to follow.    08/01/2022

## 2022-08-01 NOTE — SUBJECTIVE & OBJECTIVE
Interval History: No complaints. Passing some gas. Eating clear liquids.     Review of Systems   Respiratory:  Negative for shortness of breath.    Cardiovascular:  Negative for chest pain.   Gastrointestinal:  Negative for nausea and vomiting.   Objective:     Vital Signs (Most Recent):  Temp: 98 °F (36.7 °C) (08/01/22 1100)  Pulse: 73 (08/01/22 1100)  Resp: 18 (08/01/22 1100)  BP: (!) 172/89 (08/01/22 1100)  SpO2: 99 % (08/01/22 1100)   Vital Signs (24h Range):  Temp:  [97.4 °F (36.3 °C)-98.8 °F (37.1 °C)] 98 °F (36.7 °C)  Pulse:  [71-92] 73  Resp:  [16-18] 18  SpO2:  [96 %-99 %] 99 %  BP: (150-187)/() 172/89     Weight: 121.1 kg (267 lb)  Body mass index is 35.23 kg/m².    Intake/Output Summary (Last 24 hours) at 8/1/2022 1147  Last data filed at 8/1/2022 0624  Gross per 24 hour   Intake 2300 ml   Output 2695 ml   Net -395 ml      Physical Exam  Vitals reviewed.   Constitutional:       General: He is not in acute distress.     Appearance: He is obese. He is not ill-appearing.   Cardiovascular:      Rate and Rhythm: Normal rate and regular rhythm.      Heart sounds: Normal heart sounds.   Pulmonary:      Effort: Pulmonary effort is normal. No respiratory distress.      Breath sounds: Normal breath sounds. No wheezing.   Abdominal:      General: Bowel sounds are normal. There is no distension.      Tenderness: There is no abdominal tenderness. There is no guarding.   Musculoskeletal:      Right lower leg: No edema.      Left lower leg: No edema.   Skin:     General: Skin is warm and dry.      Comments: Dressing L foot intact.   Neurological:      Mental Status: He is alert.       Significant Labs: All pertinent labs within the past 24 hours have been reviewed.  BMP:   Recent Labs   Lab 08/01/22  0309         K 3.9   *   CO2 22   BUN 10   CREATININE 1.50*   CALCIUM 8.8   MG 2.1     CBC:   Recent Labs   Lab 07/31/22  0141 08/01/22  0309   WBC 12.06* 8.75   HGB 9.9* 10.0*   HCT 30.1* 31.5*     507       Significant Imaging: I have reviewed all pertinent imaging results/findings within the past 24 hours.

## 2022-08-01 NOTE — PT/OT/SLP PROGRESS
Physical Therapy      Patient Name:  Chaim Johnson   MRN:  [id number]    Patient not seen today secondary to Nursing care, Wound care. On first attempt, RN administering medication and working on patient IV. On next 3 attempts surgery staff had taken off patient left foot wound dressing and wound care had not had a chance to redress pt wound/change abdominal vac. Total of 2 attempts AM, 2 attempts PM. Will follow-up tomorrow..

## 2022-08-01 NOTE — PROGRESS NOTES
[facility] Hospital Medicine  Progress Note    Patient Name: Chaim Johnson  MRN: [id number]  Patient Class: IP- Surgery Admit   Admission Date: 7/28/2022  Length of Stay: 4 days  Attending Physician: Tripp Palmer DO  Primary Care Provider: LANDON Evans        Subjective:     Principal Problem:Aftercare following surgery        HPI:  45 yo M underwent colostomy closure, MARIAM, appy and parasternal hernia repair today.  He is c/o pain.  He is about to have NGT placed to LIS.  He has ALEX but does not want a CPAP at this time q hs due to NGT and due to risk of N/V and aspiration this is probably a good idea.  Patient has had necrotizing fascitis of left thigh and has transmetarsal amputation of left foot with diabetic foot ulcer.  He follows in [place] for wound care with Rivas Stafford MD with [facility].  He is on full dose anticoagulation with eliquis which is being held at this time.  He does not know why he is on anticoagulation and I can find no record in hospital chart.      Patient had SMITH when he had alcoholic pancreatitis last year and follows with Dr. Flores and creat is now 1.3 and was previously 4.3.  He has been able to restart his glucophage and lisinopril.  Dr. Palmer is holding ACEI and CCB but continuing BB post op and monitoring BP closely.        Overview/Hospital Course:  07/29 Patient complains of abdominal pain. Pain pump in place. NGT in place. Accucheck 155.POD#1.  07/30 OR today for debridement of right foot ulcer. NGT in place.   07/31 Patient voices no complaints. NG removed. Wound care. Accuchek 134      Interval History: No complaints. Passing some gas. Eating clear liquids.     Review of Systems   Respiratory:  Negative for shortness of breath.    Cardiovascular:  Negative for chest pain.   Gastrointestinal:  Negative for nausea and vomiting.   Objective:     Vital Signs (Most Recent):  Temp: 98 °F (36.7 °C) (08/01/22 1100)  Pulse: 73  (08/01/22 1100)  Resp: 18 (08/01/22 1100)  BP: (!) 172/89 (08/01/22 1100)  SpO2: 99 % (08/01/22 1100)   Vital Signs (24h Range):  Temp:  [97.4 °F (36.3 °C)-98.8 °F (37.1 °C)] 98 °F (36.7 °C)  Pulse:  [71-92] 73  Resp:  [16-18] 18  SpO2:  [96 %-99 %] 99 %  BP: (150-187)/() 172/89     Weight: 121.1 kg (267 lb)  Body mass index is 35.23 kg/m².    Intake/Output Summary (Last 24 hours) at 8/1/2022 1147  Last data filed at 8/1/2022 0624  Gross per 24 hour   Intake 2300 ml   Output 2695 ml   Net -395 ml      Physical Exam  Vitals reviewed.   Constitutional:       General: He is not in acute distress.     Appearance: He is obese. He is not ill-appearing.   Cardiovascular:      Rate and Rhythm: Normal rate and regular rhythm.      Heart sounds: Normal heart sounds.   Pulmonary:      Effort: Pulmonary effort is normal. No respiratory distress.      Breath sounds: Normal breath sounds. No wheezing.   Abdominal:      General: Bowel sounds are normal. There is no distension.      Tenderness: There is no abdominal tenderness. There is no guarding.   Musculoskeletal:      Right lower leg: No edema.      Left lower leg: No edema.   Skin:     General: Skin is warm and dry.      Comments: Dressing L foot intact.   Neurological:      Mental Status: He is alert.       Significant Labs: All pertinent labs within the past 24 hours have been reviewed.  BMP:   Recent Labs   Lab 08/01/22  0309         K 3.9   *   CO2 22   BUN 10   CREATININE 1.50*   CALCIUM 8.8   MG 2.1     CBC:   Recent Labs   Lab 07/31/22  0141 08/01/22  0309   WBC 12.06* 8.75   HGB 9.9* 10.0*   HCT 30.1* 31.5*    306       Significant Imaging: I have reviewed all pertinent imaging results/findings within the past 24 hours.      Assessment/Plan:      * Aftercare following surgery  Defer to primary team.        Diabetes mellitus  Patient's FSGs are controlled on current medication regimen.  Last A1c reviewed-   Lab Results   Component Value  Date    HGBA1C 7.6 (H) 07/28/2022     Most recent fingerstick glucose reviewed- No results for input(s): POCTGLUCOSE in the last 24 hours.  Current correctional scale  Medium  Maintain anti-hyperglycemic dose as follows-   Antihyperglycemics (From admission, onward)            Start     Stop Route Frequency Ordered    07/28/22 2100  insulin detemir U-100 injection 15 Units         -- SubQ Nightly 07/28/22 1818 07/28/22 1917  insulin aspart U-100 injection 1-10 Units         -- SubQ Before meals & nightly PRN 07/28/22 1818    07/28/22 1754  insulin aspart U-100 injection 1-10 Units         -- SubQ Before meals & nightly PRN 07/28/22 1754        Hold Oral hypoglycemics while patient is in the hospital.  Continue basal insulin with correctional bolus.    Restart nutritional insulin if appropriate when tolerating PO.      Diabetic foot infection  Continue Vanc and zosyn pending cx. Vanc levels high. Pharmacy adjusting.     ALEX (obstructive sleep apnea)  Holding CPAP until NGT discontinued and no longer aspiration risk.       Benign essential HTN  Holding ACE and CCB at present.  Restart when BP tolerates  Continue BB.         VTE Risk Mitigation (From admission, onward)         Ordered     enoxaparin injection 40 mg  Daily         07/28/22 1754     IP VTE HIGH RISK PATIENT  Once         07/28/22 1754     Place sequential compression device  Until discontinued         07/28/22 1754     Place sequential compression device  Until discontinued         07/28/22 1754                Discharge Planning   KASSIE:      Code Status: Full Code   Is the patient medically ready for discharge?:     Reason for patient still in hospital (select all that apply): Treatment  Discharge Plan A: Home Health ([facility])                  Rj Aceves Jr, MD  Department of Hospital Medicine   [facility]

## 2022-08-01 NOTE — PROGRESS NOTES
[facility]  General Surgery  Progress Note    Subjective:     History of Present Illness:  No notes on file    Post-Op Info:  Procedure(s) (LRB):  DEBRIDEMENT, LOWER EXTREMITY (Left)   2 Days Post-Op     No new subjective & objective note has been filed under this hospital service since the last note was generated.    Assessment/Plan:     Parastomal hernia  07/27/2022 colostomy  takedown  Open repair of parastomal hernia. NWPT LLQ with seal  LEE midline with seal, NGT high output, await ileus, /hr add PPI, PT has patient up to chair    7/31: stable; pain better controlled. Awaiting bowel function; NG tube accidentally removed last night, ok to leave out for now. Ambulate    Diabetic foot infection  07/29/2022 lateral  left foot wound gangrenous with abscess purelent drainage expressed  malodorous  Start vanc/zosyn npo after midnight for drainage debridement    7/31: s/p debridement. Await cultures; continue antibiotics    08/01/2022 LLQ wound  7x5x3 cm per dr mckinney in OR, ostomy take down wound vac with seal, +flatus, advance to full liquids diet, francisco serous 20cc,   Left foot wound cx GNB, enterococcus, awiat final cultures, consult ID, for possible long term iv abx, ostemyelitis,         Lizabeth Krause, KATHLEENP  General Surgery  [facility]

## 2022-08-01 NOTE — DISCHARGE INSTRUCTIONS
Bilateral feet diabetic ulcers: DAILY  1. Cleanse all with soap and water  2. Irrigate all with vashe  3. Periwound: pat dry. Spray no sting barrier spray and allow to air dry.  4. Apply multidex gel to gauze and place over wound beds.   5. Wrap in kerlix/rolled gauze.    *Please keep all f/u appts with outpt wound care clinic & inform them of new wounds between left foot 1st & 2nd toe as well as to the plantar surface of right foot.    HOLD Lisinipril due to renal function

## 2022-08-01 NOTE — PROGRESS NOTES
Lab unable to get sufficient quantity of blood for vancomycin random. Since it has been several hours for level to fall, will start back tonight at a lower dose.    Vancomycin 2000mg every 12 hours. Trough ordered before 4th dose.    Pharmacy will continue to monitor and make adjustments as needed.    Natali Hart, PharmD  [phone]

## 2022-08-02 LAB
ANION GAP SERPL CALCULATED.3IONS-SCNC: 12 MMOL/L (ref 7–16)
BASOPHILS # BLD AUTO: 0.03 K/UL (ref 0–0.2)
BASOPHILS NFR BLD AUTO: 0.3 % (ref 0–1)
BUN SERPL-MCNC: 10 MG/DL (ref 7–18)
BUN/CREAT SERPL: 6 (ref 6–20)
CALCIUM SERPL-MCNC: 9.1 MG/DL (ref 8.5–10.1)
CHLORIDE SERPL-SCNC: 109 MMOL/L (ref 98–107)
CO2 SERPL-SCNC: 25 MMOL/L (ref 21–32)
CREAT SERPL-MCNC: 1.62 MG/DL (ref 0.7–1.3)
CULTURE, BONE: ABNORMAL
DIFFERENTIAL METHOD BLD: ABNORMAL
EOSINOPHIL # BLD AUTO: 0.29 K/UL (ref 0–0.5)
EOSINOPHIL NFR BLD AUTO: 3.2 % (ref 1–4)
ERYTHROCYTE [DISTWIDTH] IN BLOOD BY AUTOMATED COUNT: 14.2 % (ref 11.5–14.5)
ESTROGEN SERPL-MCNC: NORMAL PG/ML
GLUCOSE SERPL-MCNC: 156 MG/DL (ref 70–105)
GLUCOSE SERPL-MCNC: 175 MG/DL (ref 70–105)
GLUCOSE SERPL-MCNC: 196 MG/DL (ref 70–105)
GLUCOSE SERPL-MCNC: 247 MG/DL (ref 74–106)
GLUCOSE SERPL-MCNC: 276 MG/DL (ref 70–105)
GLUCOSE SERPL-MCNC: 391 MG/DL (ref 70–105)
HCT VFR BLD AUTO: 32.2 % (ref 40–54)
HGB BLD-MCNC: 10.2 G/DL (ref 13.5–18)
IMM GRANULOCYTES # BLD AUTO: 0.04 K/UL (ref 0–0.04)
IMM GRANULOCYTES NFR BLD: 0.4 % (ref 0–0.4)
INSULIN SERPL-ACNC: NORMAL U[IU]/ML
LAB AP GROSS DESCRIPTION: NORMAL
LAB AP LABORATORY NOTES: NORMAL
LYMPHOCYTES # BLD AUTO: 1.27 K/UL (ref 1–4.8)
LYMPHOCYTES NFR BLD AUTO: 14.2 % (ref 27–41)
MAGNESIUM SERPL-MCNC: 1.9 MG/DL (ref 1.7–2.3)
MCH RBC QN AUTO: 29.9 PG (ref 27–31)
MCHC RBC AUTO-ENTMCNC: 31.7 G/DL (ref 32–36)
MCV RBC AUTO: 94.4 FL (ref 80–96)
MICROORGANISM SPEC CULT: ABNORMAL
MONOCYTES # BLD AUTO: 0.61 K/UL (ref 0–0.8)
MONOCYTES NFR BLD AUTO: 6.8 % (ref 2–6)
MPC BLD CALC-MCNC: 9.5 FL (ref 9.4–12.4)
NEUTROPHILS # BLD AUTO: 6.71 K/UL (ref 1.8–7.7)
NEUTROPHILS NFR BLD AUTO: 75.1 % (ref 53–65)
NRBC # BLD AUTO: 0 X10E3/UL
NRBC, AUTO (.00): 0 %
PHOSPHATE SERPL-MCNC: 4 MG/DL (ref 2.5–4.5)
PLATELET # BLD AUTO: 378 K/UL (ref 150–400)
POTASSIUM SERPL-SCNC: 3.4 MMOL/L (ref 3.5–5.1)
RBC # BLD AUTO: 3.41 M/UL (ref 4.6–6.2)
SODIUM SERPL-SCNC: 143 MMOL/L (ref 136–145)
T3RU NFR SERPL: NORMAL %
VANCOMYCIN TROUGH SERPL-MCNC: 40.1 ΜG/ML (ref 10–20)
WBC # BLD AUTO: 8.95 K/UL (ref 4.5–11)

## 2022-08-02 PROCEDURE — 84100 ASSAY OF PHOSPHORUS: CPT | Performed by: STUDENT IN AN ORGANIZED HEALTH CARE EDUCATION/TRAINING PROGRAM

## 2022-08-02 PROCEDURE — 63600175 PHARM REV CODE 636 W HCPCS: Performed by: STUDENT IN AN ORGANIZED HEALTH CARE EDUCATION/TRAINING PROGRAM

## 2022-08-02 PROCEDURE — 97110 THERAPEUTIC EXERCISES: CPT | Mod: CQ

## 2022-08-02 PROCEDURE — C9399 UNCLASSIFIED DRUGS OR BIOLOG: HCPCS | Performed by: HOSPITALIST

## 2022-08-02 PROCEDURE — 25000003 PHARM REV CODE 250: Performed by: STUDENT IN AN ORGANIZED HEALTH CARE EDUCATION/TRAINING PROGRAM

## 2022-08-02 PROCEDURE — 63600175 PHARM REV CODE 636 W HCPCS: Performed by: HOSPITALIST

## 2022-08-02 PROCEDURE — 36415 COLL VENOUS BLD VENIPUNCTURE: CPT | Performed by: STUDENT IN AN ORGANIZED HEALTH CARE EDUCATION/TRAINING PROGRAM

## 2022-08-02 PROCEDURE — 11000001 HC ACUTE MED/SURG PRIVATE ROOM

## 2022-08-02 PROCEDURE — 80202 ASSAY OF VANCOMYCIN: CPT | Performed by: STUDENT IN AN ORGANIZED HEALTH CARE EDUCATION/TRAINING PROGRAM

## 2022-08-02 PROCEDURE — 25000003 PHARM REV CODE 250: Performed by: NURSE PRACTITIONER

## 2022-08-02 PROCEDURE — 99232 PR SUBSEQUENT HOSPITAL CARE,LEVL II: ICD-10-PCS | Mod: ,,, | Performed by: FAMILY MEDICINE

## 2022-08-02 PROCEDURE — C9113 INJ PANTOPRAZOLE SODIUM, VIA: HCPCS | Performed by: STUDENT IN AN ORGANIZED HEALTH CARE EDUCATION/TRAINING PROGRAM

## 2022-08-02 PROCEDURE — 82962 GLUCOSE BLOOD TEST: CPT

## 2022-08-02 PROCEDURE — 99233 PR SUBSEQUENT HOSPITAL CARE,LEVL III: ICD-10-PCS | Mod: ,,, | Performed by: INTERNAL MEDICINE

## 2022-08-02 PROCEDURE — 94761 N-INVAS EAR/PLS OXIMETRY MLT: CPT

## 2022-08-02 PROCEDURE — 99233 SBSQ HOSP IP/OBS HIGH 50: CPT | Mod: ,,, | Performed by: INTERNAL MEDICINE

## 2022-08-02 PROCEDURE — 99232 SBSQ HOSP IP/OBS MODERATE 35: CPT | Mod: ,,, | Performed by: FAMILY MEDICINE

## 2022-08-02 PROCEDURE — 25000003 PHARM REV CODE 250: Performed by: HOSPITALIST

## 2022-08-02 PROCEDURE — 99900035 HC TECH TIME PER 15 MIN (STAT)

## 2022-08-02 PROCEDURE — 85025 COMPLETE CBC W/AUTO DIFF WBC: CPT | Performed by: STUDENT IN AN ORGANIZED HEALTH CARE EDUCATION/TRAINING PROGRAM

## 2022-08-02 PROCEDURE — 80048 BASIC METABOLIC PNL TOTAL CA: CPT | Performed by: STUDENT IN AN ORGANIZED HEALTH CARE EDUCATION/TRAINING PROGRAM

## 2022-08-02 PROCEDURE — 97530 THERAPEUTIC ACTIVITIES: CPT | Mod: CQ

## 2022-08-02 PROCEDURE — 83735 ASSAY OF MAGNESIUM: CPT | Performed by: STUDENT IN AN ORGANIZED HEALTH CARE EDUCATION/TRAINING PROGRAM

## 2022-08-02 PROCEDURE — 63600175 PHARM REV CODE 636 W HCPCS: Performed by: FAMILY MEDICINE

## 2022-08-02 PROCEDURE — 82310 ASSAY OF CALCIUM: CPT | Performed by: STUDENT IN AN ORGANIZED HEALTH CARE EDUCATION/TRAINING PROGRAM

## 2022-08-02 RX ORDER — INSULIN ASPART 100 [IU]/ML
3 INJECTION, SOLUTION INTRAVENOUS; SUBCUTANEOUS
Status: DISCONTINUED | OUTPATIENT
Start: 2022-08-02 | End: 2022-08-03

## 2022-08-02 RX ADMIN — SODIUM CHLORIDE, POTASSIUM CHLORIDE, SODIUM LACTATE AND CALCIUM CHLORIDE: 600; 310; 30; 20 INJECTION, SOLUTION INTRAVENOUS at 06:08

## 2022-08-02 RX ADMIN — CARVEDILOL 25 MG: 25 TABLET, FILM COATED ORAL at 08:08

## 2022-08-02 RX ADMIN — INSULIN ASPART 10 UNITS: 100 INJECTION, SOLUTION INTRAVENOUS; SUBCUTANEOUS at 12:08

## 2022-08-02 RX ADMIN — AMLODIPINE BESYLATE 5 MG: 5 TABLET ORAL at 08:08

## 2022-08-02 RX ADMIN — INSULIN ASPART 6 UNITS: 100 INJECTION, SOLUTION INTRAVENOUS; SUBCUTANEOUS at 08:08

## 2022-08-02 RX ADMIN — HYDROCODONE BITARTRATE AND ACETAMINOPHEN 1 TABLET: 7.5; 325 TABLET ORAL at 10:08

## 2022-08-02 RX ADMIN — VANCOMYCIN HYDROCHLORIDE 2000 MG: 5 INJECTION, POWDER, LYOPHILIZED, FOR SOLUTION INTRAVENOUS at 09:08

## 2022-08-02 RX ADMIN — PIPERACILLIN SODIUM AND TAZOBACTAM SODIUM 4.5 G: 4; .5 INJECTION, POWDER, LYOPHILIZED, FOR SOLUTION INTRAVENOUS at 04:08

## 2022-08-02 RX ADMIN — INSULIN ASPART 3 UNITS: 100 INJECTION, SOLUTION INTRAVENOUS; SUBCUTANEOUS at 04:08

## 2022-08-02 RX ADMIN — PIPERACILLIN SODIUM AND TAZOBACTAM SODIUM 4.5 G: 4; .5 INJECTION, POWDER, LYOPHILIZED, FOR SOLUTION INTRAVENOUS at 09:08

## 2022-08-02 RX ADMIN — SODIUM CHLORIDE, POTASSIUM CHLORIDE, SODIUM LACTATE AND CALCIUM CHLORIDE: 600; 310; 30; 20 INJECTION, SOLUTION INTRAVENOUS at 09:08

## 2022-08-02 RX ADMIN — INSULIN ASPART 3 UNITS: 100 INJECTION, SOLUTION INTRAVENOUS; SUBCUTANEOUS at 12:08

## 2022-08-02 RX ADMIN — LISINOPRIL 10 MG: 10 TABLET ORAL at 08:08

## 2022-08-02 RX ADMIN — PANTOPRAZOLE SODIUM 40 MG: 40 INJECTION, POWDER, FOR SOLUTION INTRAVENOUS at 08:08

## 2022-08-02 RX ADMIN — CARVEDILOL 25 MG: 25 TABLET, FILM COATED ORAL at 04:08

## 2022-08-02 RX ADMIN — HYDROCODONE BITARTRATE AND ACETAMINOPHEN 1 TABLET: 7.5; 325 TABLET ORAL at 04:08

## 2022-08-02 RX ADMIN — PIPERACILLIN SODIUM AND TAZOBACTAM SODIUM 4.5 G: 4; .5 INJECTION, POWDER, LYOPHILIZED, FOR SOLUTION INTRAVENOUS at 01:08

## 2022-08-02 RX ADMIN — GABAPENTIN 300 MG: 300 CAPSULE ORAL at 09:08

## 2022-08-02 RX ADMIN — GABAPENTIN 300 MG: 300 CAPSULE ORAL at 08:08

## 2022-08-02 RX ADMIN — INSULIN DETEMIR 23 UNITS: 100 INJECTION, SOLUTION SUBCUTANEOUS at 08:08

## 2022-08-02 RX ADMIN — ENOXAPARIN SODIUM 40 MG: 40 INJECTION SUBCUTANEOUS at 04:08

## 2022-08-02 NOTE — PLAN OF CARE
Problem: Infection  Goal: Absence of Infection Signs and Symptoms  8/1/2022 2003 by Roney Black CRTT  Outcome: Ongoing, Progressing  8/1/2022 2003 by Roney Black CRTT  Outcome: Ongoing, Progressing     Problem: Oral Intake Inadequate (Acute Kidney Injury/Impairment)  Goal: Optimal Nutrition Intake  Outcome: Ongoing, Progressing     Problem: Adult Inpatient Plan of Care  Goal: Plan of Care Review  Outcome: Ongoing, Progressing  Goal: Patient-Specific Goal (Individualized)  Outcome: Ongoing, Progressing  Goal: Absence of Hospital-Acquired Illness or Injury  Outcome: Ongoing, Progressing  Goal: Optimal Comfort and Wellbeing  Outcome: Ongoing, Progressing     Problem: Skin Injury Risk Increased  Goal: Skin Health and Integrity  Outcome: Ongoing, Progressing

## 2022-08-02 NOTE — PT/OT/SLP PROGRESS
Physical Therapy Treatment    Patient Name:  Chaim Johnson   MRN:  [id number]    Recommendations:     Discharge Recommendations:   (to be determined by progress and pt arranging a new place to live)   Discharge Equipment Recommendations: walker, rolling   Barriers to discharge: None    Assessment:     Chaim Johnson is a 46 y.o. male admitted with a medical diagnosis of Aftercare following surgery.  He presents with the following impairments/functional limitations:  weakness, impaired balance, impaired endurance .    Rehab Prognosis: Good; patient would benefit from acute skilled PT services to address these deficits and reach maximum level of function.    Recent Surgery: Procedure(s) (LRB):  DEBRIDEMENT, LOWER EXTREMITY (Left) 3 Days Post-Op     Received Plan of Care per Faustino Hamm PT     Plan:     During this hospitalization, patient to be seen 5 x/week to address the identified rehab impairments via gait training, therapeutic activities, therapeutic exercises and progress toward the following goals:    · Plan of Care Expires:  08/29/22    Subjective     Chief Complaint: diarrhea bouts since midnight causing him frequent trips to the bathroom; exhausted  Patient/Family Comments/goals: agrees to PT Tx as able   Pain/Comfort:  · Pain Rating 1: 0/10      Objective:     Communicated with LUDMILA Rodriguez prior to session.  Patient found HOB elevated with wound vac, peripheral IV, ABELINO drain upon PT entry to room.     General Precautions: Standard, fall   Orthopedic Precautions: (heel WB right LE)   Braces:    Respiratory Status: Room air     Functional Mobility:  · Bed Mobility:     · Scooting left to head of bed: modified independence  · Supine to Sit: modified independence  · Sit to Supine: modified independence  · Transfers:     · Sit to Stand:  modified independence with no AD      AM-PAC 6 CLICK MOBILITY  Turning over in bed (including adjusting bedclothes, sheets and blankets)?: 4  Sitting down on and standing up from a  chair with arms (e.g., wheelchair, bedside commode, etc.): 4  Moving from lying on back to sitting on the side of the bed?: 4  Moving to and from a bed to a chair (including a wheelchair)?: 4  Need to walk in hospital room?: 3  Climbing 3-5 steps with a railing?: 3  Basic Mobility Total Score: 22       Therapeutic Activities and Exercises:   bed mobility as noted; sat edge of bed x 15 minutes  Supine BLE exercises x 20 repetitions each: ankle pumps, hip abduction, straight leg raises, heel slides  Sitting BLE exercises x 20 repetitions each: marching, long arc quads, hip abduction, ankle rocking  Standing unsupported with SVN x 10-15 repetitions each: marching, mini squats, hip abduction    Patient left HOB elevated with call button in reach and RN notified..    GOALS:   Multidisciplinary Problems     Physical Therapy Goals        Problem: Physical Therapy    Goal Priority Disciplines Outcome Goal Variances Interventions   Physical Therapy Goal     PT, PT/OT Ongoing, Progressing     Description: Short Term Goals to be met by: 2022    Patient will increase functional independence with mobility by performin. Supine to sit with independently  2. Sit to stand transfer with independently using Rolling walker and heel WB right LE  3. Bed to chair transfer with independently using Rolling walker and heel WB right LE  4. Gait  x 100 feet with independently using Rolling walker and heel WB right LE    Long Term Goals to be met by: 2022    Pt will regain full independent functional mobility with lowest level of assistive device to return to home situation and prior activities of daily living.                    Time Tracking:     PT Received On: 22  PT Start Time: 1403     PT Stop Time: 1430  PT Total Time (min): 27 min     Billable Minutes: Therapeutic Activity 10 and Therapeutic Exercise 17    Treatment Type: Treatment  PT/PTA: PTA     PTA Visit Number: 1     2022

## 2022-08-02 NOTE — NURSING
2359Patient disconnected wound vac tubing to ambulate to bathroom. Wound vac detected air leak. Dressing reinforced.   0330 wound vac dressing changed. Air leak detected. Dressing reinforced.   0500 air leak detected from wound vac. Dressing reinforced. Will contact wound care nurse.

## 2022-08-02 NOTE — PROGRESS NOTES
Progress Note                                                           Infectious disease   Admit Date: 7/28/2022    SUBJECTIVE:     Follow-up For:  Aftercare following surgery    HPI/Interval history:  Patient with no complaints today.    Review of Systems:    Constitutional: no fever or chills, no change in baseline mental state   Eyes: no visual changes. No redness or jaundice, no blurred vision   ENT: no nasal congestion or sore throat, no nose bleeds, no ear ache   Respiratory: no cough or shortness of breath, no wheezing, no stridor   Gastrointestinal: no nausea / vomiting / diarrhea, no abdominal pain, no constipation   Genitourinary: no hematuria or dysuria, no frequency or urgency   Hematologic/Lymphatic: no easy bruising or lymphadenopathy   Musculoskeletal: no arthralgias or myalgias, no weakness       OBJECTIVE:     Vital Signs Range (Last 24H):  Temp:  [97.2 °F (36.2 °C)-98.6 °F (37 °C)]   Pulse:  [71-83]   Resp:  [16-18]   BP: (164-195)/()   SpO2:  [96 %-100 %]     Physical Exam:  Constitutional:  No acute distress, AAO x 3   HENT: supple   Head: normocephalic, atraumatic   Eyes: Conjunctivae normal. Pupils are equal, round, and reactive to light and accomodation, no drainage   Cardiovascular: regular rate and rhythm, S1, S2 normal, no murmur, click, rub or gallop  Pulmonary: normal respiratory effort, no crepitations or wheezing  Gastrointestinal: non-distended, bowel sounds normal; non-tender, no masses or organomegaly appreciated  Muscular/Skeletal:  Left foot bandaged.  Skin: Skin color, texture normal. No rashes, ulcers or new lesions. Skin warm and dry.   Psychiatric: Normal mood and affect. Normal behavior.    Laboratory:  CBC:   Recent Labs   Lab 08/02/22  0347   WBC 8.95   RBC 3.41*   HGB 10.2*   HCT 32.2*      MCV 94.4   MCH 29.9   MCHC 31.7*     BMP:   Recent Labs   Lab 08/02/22  0347   *      K 3.4*   *   CO2 25   BUN 10   CREATININE 1.62*   CALCIUM 9.1   MG  1.9     CMP:   Recent Labs   Lab 08/02/22  0347   *   CALCIUM 9.1      K 3.4*   CO2 25   *   BUN 10   CREATININE 1.62*     Microbiology Results (last 7 days)     Procedure Component Value Units Date/Time    Culture, Wound [[id number]]  (Abnormal)  (Susceptibility) Collected: 07/30/22 1234    Order Status: Completed Specimen: Wound from Foot, Left Updated: 08/02/22 0942     Culture, Wound/Abscess Moderate Growth Pseudomonas aeruginosa      Heavy Growth Enterococcus faecalis      Moderate Growth Klebsiella pneumoniae      Moderate Growth Escherichia coli    Culture, Bone [[id number]]  (Abnormal)  (Susceptibility) Collected: 07/30/22 1234    Order Status: Completed Specimen: Bone from Foot, Left Updated: 08/02/22 0936     Culture, Bone Heavy Growth Escherichia coli      Heavy Growth Enterococcus faecalis      Heavy Growth Klebsiella pneumoniae    Blood culture [[id number]] Collected: 08/01/22 1426    Order Status: Sent Specimen: Blood Updated: 08/01/22 1501    Blood culture [[id number]] Collected: 08/01/22 1426    Order Status: Sent Specimen: Blood Updated: 08/01/22 1501    Culture, Anaerobe [[id number]] Collected: 07/30/22 1234    Order Status: Resulted Specimen: Wound from Foot, Left Updated: 07/30/22 1235          Diagnostic Results:  Labs: Reviewed    ASSESSMENT/PLAN:     Active Hospital Problems    Diagnosis  POA    *Aftercare following surgery [Z48.89]  Not Applicable    Diabetic foot infection [E11.628, L08.9]  Yes    Parastomal hernia [K43.5]  Yes    Diabetes mellitus [E11.9]  Yes    Benign essential HTN [I10]  Yes    ALEX (obstructive sleep apnea) [G47.33]  Yes      Resolved Hospital Problems   No resolved problems to display.       ASSESSMENT:  1. 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  He failed conservative (medical management with 8 weeks IV antibiotics) done about 2 months ago.  2. Diabetes, somewhat uncontrolled with an A1c of 7.6%, but improved from earlier this year when  was almost 10%.  3. Mild renal insufficiency, relatively stable on vancomycin        PLAN:  1. Discontinue vancomycin  2. Continue Zosyn to cover all the organisms.  He will need minimum 6 weeks antibiotic therapy.   3. Aggressive local wound care.

## 2022-08-02 NOTE — PROGRESS NOTES
Vancomycin trough is not within range. Holding until trough is around 15. Will re-dose at that time.

## 2022-08-02 NOTE — PLAN OF CARE
Problem: Infection  Goal: Absence of Infection Signs and Symptoms  Outcome: Ongoing, Progressing  Intervention: Prevent or Manage Infection  Flowsheets (Taken 8/2/2022 0332)  Fever Reduction/Comfort Measures: lightweight clothing     Problem: Fluid and Electrolyte Imbalance (Acute Kidney Injury/Impairment)  Goal: Fluid and Electrolyte Balance  Outcome: Ongoing, Progressing     Problem: Oral Intake Inadequate (Acute Kidney Injury/Impairment)  Goal: Optimal Nutrition Intake  Outcome: Ongoing, Progressing  Intervention: Promote and Optimize Nutrition  Flowsheets (Taken 8/2/2022 0332)  Oral Nutrition Promotion: rest periods promoted     Problem: Renal Function Impairment (Acute Kidney Injury/Impairment)  Goal: Effective Renal Function  Outcome: Ongoing, Progressing  Intervention: Monitor and Support Renal Function  Flowsheets (Taken 8/2/2022 0332)  Medication Review/Management: medications reviewed     Problem: Adult Inpatient Plan of Care  Goal: Plan of Care Review  Outcome: Ongoing, Progressing  Flowsheets (Taken 8/2/2022 0332)  Plan of Care Reviewed With: patient  Goal: Patient-Specific Goal (Individualized)  Outcome: Ongoing, Progressing  Flowsheets (Taken 8/2/2022 0332)  Anxieties, Fears or Concerns: pain control  Individualized Care Needs: wound care, pain control  Patient-Specific Goals (Include Timeframe): pain control by discharge,  Goal: Absence of Hospital-Acquired Illness or Injury  Outcome: Ongoing, Progressing  Intervention: Identify and Manage Fall Risk  Flowsheets (Taken 8/2/2022 0332)  Safety Promotion/Fall Prevention: assistive device/personal item within reach  Intervention: Prevent Skin Injury  Flowsheets (Taken 8/2/2022 0332)  Body Position: position changed independently  Skin Protection: adhesive use limited  Intervention: Prevent and Manage VTE (Venous Thromboembolism) Risk  Flowsheets (Taken 8/2/2022 0332)  Activity Management: Rolling - L1  VTE Prevention/Management:   remove, assess skin,  and reapply sequential compression device   ambulation promoted  Range of Motion: active ROM (range of motion) encouraged  Intervention: Prevent Infection  Flowsheets (Taken 8/2/2022 0332)  Infection Prevention:   hand hygiene promoted   personal protective equipment utilized   rest/sleep promoted   single patient room provided  Goal: Optimal Comfort and Wellbeing  Outcome: Ongoing, Progressing  Intervention: Monitor Pain and Promote Comfort  Flowsheets (Taken 8/2/2022 0332)  Pain Management Interventions:   pain management plan reviewed with patient/caregiver   care clustered  Intervention: Provide Person-Centered Care  Flowsheets (Taken 8/2/2022 0332)  Trust Relationship/Rapport: care explained  Goal: Readiness for Transition of Care  Outcome: Ongoing, Progressing     Problem: Diabetes Comorbidity  Goal: Blood Glucose Level Within Targeted Range  Outcome: Ongoing, Progressing  Intervention: Monitor and Manage Glycemia  Flowsheets (Taken 8/2/2022 0332)  Glycemic Management: blood glucose monitored     Problem: Bariatric Environmental Safety  Goal: Safety Maintained with Care  Outcome: Ongoing, Progressing     Problem: Impaired Wound Healing  Goal: Optimal Wound Healing  Outcome: Ongoing, Progressing  Intervention: Promote Wound Healing  Flowsheets (Taken 8/2/2022 0332)  Oral Nutrition Promotion: rest periods promoted  Activity Management: Rolling - L1  Pain Management Interventions:   pain management plan reviewed with patient/caregiver   care clustered     Problem: Fall Injury Risk  Goal: Absence of Fall and Fall-Related Injury  Outcome: Ongoing, Progressing  Intervention: Identify and Manage Contributors  Flowsheets (Taken 8/2/2022 0332)  Self-Care Promotion: independence encouraged  Medication Review/Management: medications reviewed  Intervention: Promote Injury-Free Environment  Flowsheets (Taken 8/2/2022 0332)  Safety Promotion/Fall Prevention: assistive device/personal item within reach     Problem: Skin  Injury Risk Increased  Goal: Skin Health and Integrity  Outcome: Ongoing, Progressing  Intervention: Optimize Skin Protection  Flowsheets (Taken 8/2/2022 0332)  Pressure Reduction Techniques: frequent weight shift encouraged  Skin Protection: adhesive use limited  Head of Bed (HOB) Positioning: HOB at 20-30 degrees  Intervention: Promote and Optimize Oral Intake  Flowsheets (Taken 8/2/2022 0332)  Oral Nutrition Promotion: rest periods promoted   Plan of care reviewed, patient progressing.

## 2022-08-03 PROBLEM — M86.9 OSTEOMYELITIS OF LEFT FOOT: Status: ACTIVE | Noted: 2022-08-03

## 2022-08-03 LAB
ANION GAP SERPL CALCULATED.3IONS-SCNC: 13 MMOL/L (ref 7–16)
BACTERIA SPEC ANAEROBE CULT: ABNORMAL
BASOPHILS # BLD AUTO: 0.03 K/UL (ref 0–0.2)
BASOPHILS NFR BLD AUTO: 0.4 % (ref 0–1)
BUN SERPL-MCNC: 13 MG/DL (ref 7–18)
BUN/CREAT SERPL: 6 (ref 6–20)
CALCIUM SERPL-MCNC: 8.6 MG/DL (ref 8.5–10.1)
CHLORIDE SERPL-SCNC: 108 MMOL/L (ref 98–107)
CO2 SERPL-SCNC: 25 MMOL/L (ref 21–32)
CREAT SERPL-MCNC: 2.06 MG/DL (ref 0.7–1.3)
DIFFERENTIAL METHOD BLD: ABNORMAL
EOSINOPHIL # BLD AUTO: 0.33 K/UL (ref 0–0.5)
EOSINOPHIL NFR BLD AUTO: 4.5 % (ref 1–4)
ERYTHROCYTE [DISTWIDTH] IN BLOOD BY AUTOMATED COUNT: 14.3 % (ref 11.5–14.5)
GLUCOSE SERPL-MCNC: 205 MG/DL (ref 70–105)
GLUCOSE SERPL-MCNC: 207 MG/DL (ref 70–105)
GLUCOSE SERPL-MCNC: 260 MG/DL (ref 70–105)
GLUCOSE SERPL-MCNC: 275 MG/DL (ref 74–106)
GLUCOSE SERPL-MCNC: 391 MG/DL (ref 70–105)
HCT VFR BLD AUTO: 28.1 % (ref 40–54)
HGB BLD-MCNC: 8.9 G/DL (ref 13.5–18)
IMM GRANULOCYTES # BLD AUTO: 0.04 K/UL (ref 0–0.04)
IMM GRANULOCYTES NFR BLD: 0.5 % (ref 0–0.4)
LYMPHOCYTES # BLD AUTO: 1.69 K/UL (ref 1–4.8)
LYMPHOCYTES NFR BLD AUTO: 22.9 % (ref 27–41)
MAGNESIUM SERPL-MCNC: 1.7 MG/DL (ref 1.7–2.3)
MCH RBC QN AUTO: 29.6 PG (ref 27–31)
MCHC RBC AUTO-ENTMCNC: 31.7 G/DL (ref 32–36)
MCV RBC AUTO: 93.4 FL (ref 80–96)
MONOCYTES # BLD AUTO: 0.6 K/UL (ref 0–0.8)
MONOCYTES NFR BLD AUTO: 8.1 % (ref 2–6)
MPC BLD CALC-MCNC: 10 FL (ref 9.4–12.4)
NEUTROPHILS # BLD AUTO: 4.68 K/UL (ref 1.8–7.7)
NEUTROPHILS NFR BLD AUTO: 63.6 % (ref 53–65)
NRBC # BLD AUTO: 0 X10E3/UL
NRBC, AUTO (.00): 0 %
PHOSPHATE SERPL-MCNC: 4.6 MG/DL (ref 2.5–4.5)
PLATELET # BLD AUTO: 362 K/UL (ref 150–400)
POTASSIUM SERPL-SCNC: 3.2 MMOL/L (ref 3.5–5.1)
RBC # BLD AUTO: 3.01 M/UL (ref 4.6–6.2)
SODIUM SERPL-SCNC: 143 MMOL/L (ref 136–145)
VANCOMYCIN SERPL-MCNC: 30.3 ΜG/ML (ref 0–20)
WBC # BLD AUTO: 7.37 K/UL (ref 4.5–11)

## 2022-08-03 PROCEDURE — 80202 ASSAY OF VANCOMYCIN: CPT | Performed by: STUDENT IN AN ORGANIZED HEALTH CARE EDUCATION/TRAINING PROGRAM

## 2022-08-03 PROCEDURE — 63600175 PHARM REV CODE 636 W HCPCS: Performed by: STUDENT IN AN ORGANIZED HEALTH CARE EDUCATION/TRAINING PROGRAM

## 2022-08-03 PROCEDURE — 99232 PR SUBSEQUENT HOSPITAL CARE,LEVL II: ICD-10-PCS | Mod: ,,, | Performed by: INTERNAL MEDICINE

## 2022-08-03 PROCEDURE — 63600175 PHARM REV CODE 636 W HCPCS: Performed by: NURSE PRACTITIONER

## 2022-08-03 PROCEDURE — 11000001 HC ACUTE MED/SURG PRIVATE ROOM

## 2022-08-03 PROCEDURE — 83735 ASSAY OF MAGNESIUM: CPT | Performed by: STUDENT IN AN ORGANIZED HEALTH CARE EDUCATION/TRAINING PROGRAM

## 2022-08-03 PROCEDURE — 63600175 PHARM REV CODE 636 W HCPCS: Performed by: FAMILY MEDICINE

## 2022-08-03 PROCEDURE — 25000003 PHARM REV CODE 250: Performed by: STUDENT IN AN ORGANIZED HEALTH CARE EDUCATION/TRAINING PROGRAM

## 2022-08-03 PROCEDURE — C9399 UNCLASSIFIED DRUGS OR BIOLOG: HCPCS | Performed by: HOSPITALIST

## 2022-08-03 PROCEDURE — 36415 COLL VENOUS BLD VENIPUNCTURE: CPT | Performed by: STUDENT IN AN ORGANIZED HEALTH CARE EDUCATION/TRAINING PROGRAM

## 2022-08-03 PROCEDURE — 80048 BASIC METABOLIC PNL TOTAL CA: CPT | Performed by: STUDENT IN AN ORGANIZED HEALTH CARE EDUCATION/TRAINING PROGRAM

## 2022-08-03 PROCEDURE — 97530 THERAPEUTIC ACTIVITIES: CPT | Mod: CQ

## 2022-08-03 PROCEDURE — C9113 INJ PANTOPRAZOLE SODIUM, VIA: HCPCS | Performed by: STUDENT IN AN ORGANIZED HEALTH CARE EDUCATION/TRAINING PROGRAM

## 2022-08-03 PROCEDURE — 85025 COMPLETE CBC W/AUTO DIFF WBC: CPT | Performed by: STUDENT IN AN ORGANIZED HEALTH CARE EDUCATION/TRAINING PROGRAM

## 2022-08-03 PROCEDURE — 97116 GAIT TRAINING THERAPY: CPT | Mod: CQ

## 2022-08-03 PROCEDURE — 84100 ASSAY OF PHOSPHORUS: CPT | Performed by: STUDENT IN AN ORGANIZED HEALTH CARE EDUCATION/TRAINING PROGRAM

## 2022-08-03 PROCEDURE — 63600175 PHARM REV CODE 636 W HCPCS: Performed by: HOSPITALIST

## 2022-08-03 PROCEDURE — 99024 PR POST-OP FOLLOW-UP VISIT: ICD-10-PCS | Mod: ,,, | Performed by: NURSE PRACTITIONER

## 2022-08-03 PROCEDURE — 36569 INSJ PICC 5 YR+ W/O IMAGING: CPT | Mod: 79,LT,, | Performed by: NURSE PRACTITIONER

## 2022-08-03 PROCEDURE — 25000003 PHARM REV CODE 250: Performed by: NURSE PRACTITIONER

## 2022-08-03 PROCEDURE — 99232 PR SUBSEQUENT HOSPITAL CARE,LEVL II: ICD-10-PCS | Mod: ,,, | Performed by: FAMILY MEDICINE

## 2022-08-03 PROCEDURE — 82962 GLUCOSE BLOOD TEST: CPT

## 2022-08-03 PROCEDURE — 25000003 PHARM REV CODE 250: Performed by: HOSPITALIST

## 2022-08-03 PROCEDURE — 36569 PR INSERT PICC W/O SUB-Q PORT >5Y/O: ICD-10-PCS | Mod: 79,LT,, | Performed by: NURSE PRACTITIONER

## 2022-08-03 PROCEDURE — 25000003 PHARM REV CODE 250: Performed by: FAMILY MEDICINE

## 2022-08-03 PROCEDURE — 94761 N-INVAS EAR/PLS OXIMETRY MLT: CPT

## 2022-08-03 PROCEDURE — 99024 POSTOP FOLLOW-UP VISIT: CPT | Mod: ,,, | Performed by: NURSE PRACTITIONER

## 2022-08-03 PROCEDURE — 99232 SBSQ HOSP IP/OBS MODERATE 35: CPT | Mod: ,,, | Performed by: FAMILY MEDICINE

## 2022-08-03 PROCEDURE — 99232 SBSQ HOSP IP/OBS MODERATE 35: CPT | Mod: ,,, | Performed by: INTERNAL MEDICINE

## 2022-08-03 RX ORDER — INSULIN ASPART 100 [IU]/ML
5 INJECTION, SOLUTION INTRAVENOUS; SUBCUTANEOUS
Status: DISCONTINUED | OUTPATIENT
Start: 2022-08-04 | End: 2022-08-05 | Stop reason: HOSPADM

## 2022-08-03 RX ORDER — HEPARIN SODIUM 5000 [USP'U]/ML
5000 INJECTION, SOLUTION INTRAVENOUS; SUBCUTANEOUS EVERY 8 HOURS
Status: DISCONTINUED | OUTPATIENT
Start: 2022-08-03 | End: 2022-08-05 | Stop reason: HOSPADM

## 2022-08-03 RX ORDER — POTASSIUM CHLORIDE 750 MG/1
10 TABLET, EXTENDED RELEASE ORAL 2 TIMES DAILY
Status: DISCONTINUED | OUTPATIENT
Start: 2022-08-03 | End: 2022-08-04

## 2022-08-03 RX ORDER — AMLODIPINE BESYLATE 10 MG/1
10 TABLET ORAL DAILY
Status: DISCONTINUED | OUTPATIENT
Start: 2022-08-03 | End: 2022-08-05 | Stop reason: HOSPADM

## 2022-08-03 RX ORDER — POTASSIUM CHLORIDE 20 MEQ/1
20 TABLET, EXTENDED RELEASE ORAL ONCE
Status: COMPLETED | OUTPATIENT
Start: 2022-08-03 | End: 2022-08-03

## 2022-08-03 RX ADMIN — HEPARIN SODIUM 5000 UNITS: 5000 INJECTION INTRAVENOUS; SUBCUTANEOUS at 03:08

## 2022-08-03 RX ADMIN — INSULIN ASPART 3 UNITS: 100 INJECTION, SOLUTION INTRAVENOUS; SUBCUTANEOUS at 09:08

## 2022-08-03 RX ADMIN — INSULIN ASPART 3 UNITS: 100 INJECTION, SOLUTION INTRAVENOUS; SUBCUTANEOUS at 05:08

## 2022-08-03 RX ADMIN — INSULIN DETEMIR 23 UNITS: 100 INJECTION, SOLUTION SUBCUTANEOUS at 09:08

## 2022-08-03 RX ADMIN — PANTOPRAZOLE SODIUM 40 MG: 40 INJECTION, POWDER, FOR SOLUTION INTRAVENOUS at 09:08

## 2022-08-03 RX ADMIN — PIPERACILLIN SODIUM AND TAZOBACTAM SODIUM 4.5 G: 4; .5 INJECTION, POWDER, LYOPHILIZED, FOR SOLUTION INTRAVENOUS at 01:08

## 2022-08-03 RX ADMIN — CARVEDILOL 25 MG: 25 TABLET, FILM COATED ORAL at 05:08

## 2022-08-03 RX ADMIN — AMLODIPINE BESYLATE 10 MG: 10 TABLET ORAL at 09:08

## 2022-08-03 RX ADMIN — HEPARIN SODIUM 5000 UNITS: 5000 INJECTION INTRAVENOUS; SUBCUTANEOUS at 09:08

## 2022-08-03 RX ADMIN — PIPERACILLIN SODIUM AND TAZOBACTAM SODIUM 4.5 G: 4; .5 INJECTION, POWDER, LYOPHILIZED, FOR SOLUTION INTRAVENOUS at 05:08

## 2022-08-03 RX ADMIN — INSULIN ASPART 6 UNITS: 100 INJECTION, SOLUTION INTRAVENOUS; SUBCUTANEOUS at 09:08

## 2022-08-03 RX ADMIN — INSULIN ASPART 3 UNITS: 100 INJECTION, SOLUTION INTRAVENOUS; SUBCUTANEOUS at 12:08

## 2022-08-03 RX ADMIN — INSULIN ASPART 4 UNITS: 100 INJECTION, SOLUTION INTRAVENOUS; SUBCUTANEOUS at 05:08

## 2022-08-03 RX ADMIN — CARVEDILOL 25 MG: 25 TABLET, FILM COATED ORAL at 09:08

## 2022-08-03 RX ADMIN — PIPERACILLIN SODIUM AND TAZOBACTAM SODIUM 4.5 G: 4; .5 INJECTION, POWDER, LYOPHILIZED, FOR SOLUTION INTRAVENOUS at 09:08

## 2022-08-03 RX ADMIN — POTASSIUM CHLORIDE 10 MEQ: 750 TABLET, FILM COATED, EXTENDED RELEASE ORAL at 09:08

## 2022-08-03 RX ADMIN — INSULIN ASPART 2 UNITS: 100 INJECTION, SOLUTION INTRAVENOUS; SUBCUTANEOUS at 09:08

## 2022-08-03 RX ADMIN — POTASSIUM CHLORIDE 20 MEQ: 1500 TABLET, EXTENDED RELEASE ORAL at 09:08

## 2022-08-03 RX ADMIN — SODIUM CHLORIDE, POTASSIUM CHLORIDE, SODIUM LACTATE AND CALCIUM CHLORIDE: 600; 310; 30; 20 INJECTION, SOLUTION INTRAVENOUS at 09:08

## 2022-08-03 RX ADMIN — INSULIN ASPART 10 UNITS: 100 INJECTION, SOLUTION INTRAVENOUS; SUBCUTANEOUS at 12:08

## 2022-08-03 RX ADMIN — GABAPENTIN 300 MG: 300 CAPSULE ORAL at 09:08

## 2022-08-03 RX ADMIN — HYDROCODONE BITARTRATE AND ACETAMINOPHEN 1 TABLET: 7.5; 325 TABLET ORAL at 09:08

## 2022-08-03 NOTE — PROGRESS NOTES
[facility] Hospital Medicine  Progress Note    Patient Name: Chaim Johnson  MRN: [id number]  Patient Class: IP- Surgery Admit   Admission Date: 7/28/2022  Length of Stay: 6 days  Attending Physician: Tripp Palmer DO  Primary Care Provider: LANDON Evans        Subjective:     Principal Problem:Aftercare following surgery        HPI:  45 yo M underwent colostomy closure, MARIAM, appy and parasternal hernia repair today.  He is c/o pain.  He is about to have NGT placed to LIS.  He has ALEX but does not want a CPAP at this time q hs due to NGT and due to risk of N/V and aspiration this is probably a good idea.  Patient has had necrotizing fascitis of left thigh and has transmetarsal amputation of left foot with diabetic foot ulcer.  He follows in [place] for wound care with Rivas Stafford MD with [facility].  He is on full dose anticoagulation with eliquis which is being held at this time.  He does not know why he is on anticoagulation and I can find no record in hospital chart.      Patient had SMITH when he had alcoholic pancreatitis last year and follows with Dr. Flores and creat is now 1.3 and was previously 4.3.  He has been able to restart his glucophage and lisinopril.  Dr. Palmer is holding ACEI and CCB but continuing BB post op and monitoring BP closely.        Overview/Hospital Course:  07/29 Patient complains of abdominal pain. Pain pump in place. NGT in place. Accucheck 155.POD#1.  07/30 OR today for debridement of right foot ulcer. NGT in place.   07/31 Patient voices no complaints. NG removed. Wound care. Accuchek 134    08/01 Records reviewed.   OR procedure 07/28 closure colostomy, repair hernia, appendectomy  OR procedure 07/30 debridement left lower extremity          Interval History: Notes diarrhea. Gut function has clearly returned.     Review of Systems   Respiratory:  Negative for shortness of breath.    Cardiovascular:  Negative for chest pain.    Gastrointestinal:  Negative for abdominal distention.   Objective:     Vital Signs (Most Recent):  Temp: 99.2 °F (37.3 °C) (08/03/22 0400)  Pulse: 75 (08/03/22 0400)  Resp: 18 (08/03/22 0400)  BP: (!) 164/91 (08/03/22 0400)  SpO2: 96 % (08/03/22 0400)   Vital Signs (24h Range):  Temp:  [97.2 °F (36.2 °C)-99.3 °F (37.4 °C)] 99.2 °F (37.3 °C)  Pulse:  [71-89] 75  Resp:  [14-20] 18  SpO2:  [96 %-100 %] 96 %  BP: (140-183)/(83-99) 164/91     Weight: 121.1 kg (267 lb)  Body mass index is 35.23 kg/m².    Intake/Output Summary (Last 24 hours) at 8/3/2022 0603  Last data filed at 8/3/2022 0548  Gross per 24 hour   Intake 740 ml   Output 2410 ml   Net -1670 ml      Physical Exam  Vitals reviewed.   Constitutional:       General: He is not in acute distress.     Appearance: He is obese. He is not ill-appearing.   Cardiovascular:      Rate and Rhythm: Normal rate and regular rhythm.      Heart sounds: Normal heart sounds.   Pulmonary:      Effort: Pulmonary effort is normal. No respiratory distress.      Breath sounds: Normal breath sounds. No wheezing.   Abdominal:      General: Bowel sounds are normal. There is no distension.      Tenderness: There is no abdominal tenderness. There is no guarding.   Musculoskeletal:      Right lower leg: No edema.      Left lower leg: No edema.   Skin:     General: Skin is warm and dry.      Comments: Dressing L foot intact.   Neurological:      Mental Status: He is alert.       Significant Labs: All pertinent labs within the past 24 hours have been reviewed.  BMP:   Recent Labs   Lab 08/03/22  0303   *      K 3.2*   *   CO2 25   BUN 13   CREATININE 2.06*   CALCIUM 8.6   MG 1.7     CBC:   Recent Labs   Lab 08/02/22  0347 08/03/22  0303   WBC 8.95 7.37   HGB 10.2* 8.9*   HCT 32.2* 28.1*    362       Significant Imaging: I have reviewed all pertinent imaging results/findings within the past 24 hours.      Assessment/Plan:      * Aftercare following surgery  Defer to  primary team.        Diabetes mellitus  Will add scheduled mealtime insulin    Diabetic foot infection  Continue Vanc and zosyn pending cx. Vanc levels high. Pharmacy adjusting.     Osteomyelitis of left foot    Polymicrobial.  ID consultation for antibiotic recommendation.     ALEX (obstructive sleep apnea)  Holding CPAP until NGT discontinued and no longer aspiration risk.       Benign essential HTN  Holding ACE and CCB at present.  Restart when BP tolerates  Continue BB.         VTE Risk Mitigation (From admission, onward)         Ordered     enoxaparin injection 40 mg  Daily         07/28/22 1754     IP VTE HIGH RISK PATIENT  Once         07/28/22 1754     Place sequential compression device  Until discontinued         07/28/22 1754     Place sequential compression device  Until discontinued         07/28/22 1754                Discharge Planning   KASSIE:      Code Status: Full Code   Is the patient medically ready for discharge?:     Reason for patient still in hospital (select all that apply): Treatment  Discharge Plan A: Home Health ([facility])                  Rj Aceves Jr, MD  Department of Hospital Medicine   [facility]

## 2022-08-03 NOTE — PLAN OF CARE
Problem: Infection  Goal: Absence of Infection Signs and Symptoms  Outcome: Ongoing, Progressing     Problem: Fluid and Electrolyte Imbalance (Acute Kidney Injury/Impairment)  Goal: Fluid and Electrolyte Balance  Outcome: Ongoing, Progressing     Problem: Oral Intake Inadequate (Acute Kidney Injury/Impairment)  Goal: Optimal Nutrition Intake  Outcome: Ongoing, Progressing     Problem: Renal Function Impairment (Acute Kidney Injury/Impairment)  Goal: Effective Renal Function  Outcome: Ongoing, Progressing     Problem: Adult Inpatient Plan of Care  Goal: Plan of Care Review  Outcome: Ongoing, Progressing  Goal: Patient-Specific Goal (Individualized)  Outcome: Ongoing, Progressing  Goal: Absence of Hospital-Acquired Illness or Injury  Outcome: Ongoing, Progressing  Goal: Optimal Comfort and Wellbeing  Outcome: Ongoing, Progressing  Goal: Readiness for Transition of Care  Outcome: Ongoing, Progressing     Problem: Diabetes Comorbidity  Goal: Blood Glucose Level Within Targeted Range  Outcome: Ongoing, Progressing     Problem: Bariatric Environmental Safety  Goal: Safety Maintained with Care  Outcome: Ongoing, Progressing     Problem: Impaired Wound Healing  Goal: Optimal Wound Healing  Outcome: Ongoing, Progressing     Problem: Fall Injury Risk  Goal: Absence of Fall and Fall-Related Injury  Outcome: Ongoing, Progressing     Problem: Skin Injury Risk Increased  Goal: Skin Health and Integrity  Outcome: Ongoing, Progressing   Reviewed POC

## 2022-08-03 NOTE — PT/OT/SLP PROGRESS
Physical Therapy Treatment    Patient Name:  Chaim Johnson   MRN:  [id number]    Recommendations:     Discharge Recommendations:   (to be determined by progress and pt arranging a new place to live)   Discharge Equipment Recommendations: walker, rolling   Barriers to discharge: None    Assessment:     Chaim Johnson is a 46 y.o. male admitted with a medical diagnosis of Aftercare following surgery.  He presents with the following impairments/functional limitations:  weakness .    Rehab Prognosis: Good; patient would benefit from acute skilled PT services to address these deficits and reach maximum level of function.    Recent Surgery: Procedure(s) (LRB):  DEBRIDEMENT, LOWER EXTREMITY (Left) 4 Days Post-Op    Plan:     During this hospitalization, patient to be seen 5 x/week to address the identified rehab impairments via gait training, therapeutic activities, therapeutic exercises and progress toward the following goals:    · Plan of Care Expires:  08/29/22    Subjective     Chief Complaint: eager to go home  Patient/Family Comments/goals: agrees to PT Tx  Pain/Comfort:  · Pain Rating 1: 0/10  · Location 1: abdomen (soreness with transfers)      Objective:     Communicated with LUDMILA Aviles prior to session.  Patient found HOB elevated with wound vac, peripheral IV, ABELINO drain upon PT entry to room.     General Precautions: Standard, fall   Orthopedic Precautions: (heel WB right LE)   Braces:    Respiratory Status: Room air     Functional Mobility:  · Bed Mobility:     · Supine to Sit: modified independence  · Transfers:     · Sit to Stand:  modified independence with no AD  · Bed to Chair: modified independence with  IV pole  using  Step Transfer  · Toilet Transfer: modified independence with  IV pole  using  Step Transfer  · Gait: 600+ft with SVN/CGA using IV pole for support; focus on safety and avoiding excessive WB on forefoot; pt may benefit from [employer] shoes; RN notified wound care  · Stairs:  Pt ascended/descended  3 stair(s) with bilateral handrails with CGA       AM-PAC 6 CLICK MOBILITY  Turning over in bed (including adjusting bedclothes, sheets and blankets)?: 4  Sitting down on and standing up from a chair with arms (e.g., wheelchair, bedside commode, etc.): 4  Moving from lying on back to sitting on the side of the bed?: 4  Moving to and from a bed to a chair (including a wheelchair)?: 4  Need to walk in hospital room?: 4  Climbing 3-5 steps with a railing?: 3  Basic Mobility Total Score: 23       Therapeutic Activities and Exercises:   functional mobility as noted above    Patient left up in chair with all lines intact and call button in reach..    GOALS:   Multidisciplinary Problems     Physical Therapy Goals        Problem: Physical Therapy    Goal Priority Disciplines Outcome Goal Variances Interventions   Physical Therapy Goal     PT, PT/OT Ongoing, Progressing     Description: Short Term Goals to be met by: 2022    Patient will increase functional independence with mobility by performin. Supine to sit with independently  2. Sit to stand transfer with independently using Rolling walker and heel WB right LE  3. Bed to chair transfer with independently using Rolling walker and heel WB right LE  4. Gait  x 100 feet with independently using Rolling walker and heel WB right LE    Long Term Goals to be met by: 2022    Pt will regain full independent functional mobility with lowest level of assistive device to return to home situation and prior activities of daily living.                    Time Tracking:     PT Received On: 22  PT Start Time: 1045     PT Stop Time: 1115  PT Total Time (min): 30 min     Billable Minutes: Gait Training 20 and Therapeutic Activity 10    Treatment Type: Treatment  PT/PTA: PTA     PTA Visit Number: 2     2022

## 2022-08-03 NOTE — PLAN OF CARE
[facility]  Discharge Reassessment    Primary Care Provider: LANDON Evans    Expected Discharge Date:     Reassessment (most recent)     Discharge Reassessment - 08/03/22 1324        Discharge Reassessment    Assessment Type Discharge Planning Reassessment     Discharge Plan discussed with: Patient     Discharge Plan A Home Health   [facility]    Discharge Plan B Home     DME Needed Upon Discharge  orthotic device     Discharge Barriers Identified None     Why the patient remains in the hospital Requires continued medical care        Post-Acute Status    Post-Acute Authorization IV Infusion;HME     HME Status Referrals Sent     IV Infusion Status Referral(s) sent     Patient choice form signed by patient/caregiver List from [employer] Compare;List with quality metrics by geographic area provided     Discharge Delays None known at this time               SW consulted for Zosyn continuous infusion 13.5g daily x 6 weeks and also for a post op offloading shoe. Choice obtained for [facility] and [facility]. Referral faxed to [facility] for post op shoe and to [facility] for iv abx. SW awaiting insurance approval for iv abx. SW following.

## 2022-08-03 NOTE — PROCEDURES
"Chaim Johnson is a 46 y.o. male patient.    Temp: 98.6 °F (37 °C) (08/03/22 1100)  Pulse: 85 (08/03/22 1100)  Resp: 14 (08/03/22 1100)  BP: (!) 163/98 (08/03/22 1100)  SpO2: 98 % (08/03/22 1100)  Weight: 121.1 kg (267 lb) (07/29/22 1304)  Height: 6' 1" (185.4 cm) (07/29/22 1304)       PICC LINE    Date/Time: 8/3/2022 2:46 PM  Performed by: PITER Bhatt  Authorized by: PITER Bhatt     Location procedure was performed:  [facility] GENERAL SURGERY  Consent Done ?:  Yes  Indications:  Vascular access  Anesthesia:  Local infiltration  Local anesthetic:  Lidocaine 1% without epinephrine  Preparation:  Skin prepped with ChloraPrep  Location:  Left basilic  Catheter type:  Double lumen  Catheter size:  5 Fr  Inserted Catheter Length (cm):  43  Ultrasound guidance: Yes    Vessel Caliber:  Small   patent  Comprressibility:  Normal  Needle advanced into vessel with real time ultrasound guidance.    Guidewire confirmed in vessel.    Steril sheath on probe.    Manometry: Yes    Number of attempts:  1  Securement:  Line sutured and blood return through all ports  Technical Procedures Used:  Rachel  Complications: No    XRay:  No pneumothorax on x-ray and placement verified by x-ray  Adverse Events:  None        8/3/2022  "

## 2022-08-03 NOTE — PROGRESS NOTES
[facility]  General Surgery  Progress Note    Subjective:     History of Present Illness:  No notes on file    Post-Op Info:  Procedure(s) (LRB):  DEBRIDEMENT, LOWER EXTREMITY (Left)   4 Days Post-Op     No new subjective & objective note has been filed under this hospital service since the last note was generated.    Assessment/Plan:     Parastomal hernia  07/27/2022 colostomy  takedown  Open repair of parastomal hernia. NWPT LLQ with seal  LEE midline with seal, NGT high output, await ileus, /hr add PPI, PT has patient up to chair    7/31: stable; pain better controlled. Awaiting bowel function; NG tube accidentally removed last night, ok to leave out for now. Ambulate    Diabetic foot infection  07/29/2022 lateral  left foot wound gangrenous with abscess purelent drainage expressed  malodorous  Start vanc/zosyn npo after midnight for drainage debridement    7/31: s/p debridement. Await cultures; continue antibiotics    08/01/2022 LLQ wound  7x5x3 cm per dr mckinney in OR, ostomy take down wound vac with seal, +flatus, advance to full liquids diet, francisco serous 20cc,   Left foot wound cx GNB, enterococcus, awiat final cultures, consult ID, for possible long term iv abx, ostemyelitis,     Acute renal failure superimposed on stage 3a chronic kidney disease  08/03 SMITH creatinine 2 vancomycin on hold due to abnormal trough, change lovenox to heparin, dc lisinopril increase norvasc recheck bmp am follow creatinine        Lizabeth Krause, ACNP  General Surgery  [facility]

## 2022-08-03 NOTE — PLAN OF CARE
SW spoke with Hortensia at [facility] and pt stated he already has a post op offloading shoe at home and prefers to use the one at home instead of buying a new one. SW following.

## 2022-08-03 NOTE — PROGRESS NOTES
Progress Note                                                           Infectious disease   Admit Date: 7/28/2022    SUBJECTIVE:     Follow-up For:  Aftercare following surgery    HPI/Interval history:  Patient it is doing well, supposed to get PICC line today.  Afebrile.    Review of Systems:    Constitutional: no fever or chills, no change in baseline mental state   Eyes: no visual changes. No redness or jaundice, no blurred vision   ENT: no nasal congestion or sore throat, no nose bleeds, no ear ache   Respiratory: no cough or shortness of breath, no wheezing, no stridor   Gastrointestinal: no nausea / vomiting / diarrhea, no abdominal pain, no constipation   Genitourinary: no hematuria or dysuria, no frequency or urgency   Hematologic/Lymphatic: no easy bruising or lymphadenopathy   Musculoskeletal: no arthralgias or myalgias, no weakness       OBJECTIVE:     Vital Signs Range (Last 24H):  Temp:  [98 °F (36.7 °C)-99.3 °F (37.4 °C)]   Pulse:  [75-89]   Resp:  [14-20]   BP: (140-171)/()   SpO2:  [96 %-99 %]     Physical Exam:  Constitutional:  No acute distress, AAO x 3   HENT: supple   Head: normocephalic, atraumatic   Eyes: Conjunctivae normal. Pupils are equal, round, and reactive to light and accomodation, no drainage   Cardiovascular: regular rate and rhythm, S1, S2 normal, no murmur, click, rub or gallop  Pulmonary: normal respiratory effort, no crepitations or wheezing  Gastrointestinal: non-distended, bowel sounds normal; non-tender, no masses or organomegaly appreciated  Muscular/Skeletal:  Left foot bandaged.  Skin: Skin color, texture normal. No rashes, ulcers or new lesions. Skin warm and dry.   Psychiatric: Normal mood and affect. Normal behavior.    Laboratory:  CBC:   Recent Labs   Lab 08/03/22  0303   WBC 7.37   RBC 3.01*   HGB 8.9*   HCT 28.1*      MCV 93.4   MCH 29.6   MCHC 31.7*     BMP:   Recent Labs   Lab 08/03/22  0303   *      K 3.2*   *   CO2 25   BUN 13    CREATININE 2.06*   CALCIUM 8.6   MG 1.7     CMP:   Recent Labs   Lab 08/03/22  0303   *   CALCIUM 8.6      K 3.2*   CO2 25   *   BUN 13   CREATININE 2.06*     Microbiology Results (last 7 days)     Procedure Component Value Units Date/Time    Culture, Anaerobe [[id number]]  (Abnormal) Collected: 07/30/22 1234    Order Status: Completed Specimen: Wound from Foot, Left Updated: 08/03/22 1329     Culture, Anaerobe PREVOTELLA SPECIES    Blood culture [[id number]] Collected: 08/01/22 1426    Order Status: Completed Specimen: Blood Updated: 08/03/22 0653     Culture, Blood No Growth At 24 Hours    Blood culture [[id number]] Collected: 08/01/22 1426    Order Status: Completed Specimen: Blood Updated: 08/03/22 0652     Culture, Blood No Growth At 24 Hours    Culture, Wound [[id number]]  (Abnormal)  (Susceptibility) Collected: 07/30/22 1234    Order Status: Completed Specimen: Wound from Foot, Left Updated: 08/02/22 0942     Culture, Wound/Abscess Moderate Growth Pseudomonas aeruginosa      Heavy Growth Enterococcus faecalis      Moderate Growth Klebsiella pneumoniae      Moderate Growth Escherichia coli    Culture, Bone [[id number]]  (Abnormal)  (Susceptibility) Collected: 07/30/22 1234    Order Status: Completed Specimen: Bone from Foot, Left Updated: 08/02/22 0936     Culture, Bone Heavy Growth Escherichia coli      Heavy Growth Enterococcus faecalis      Heavy Growth Klebsiella pneumoniae          Diagnostic Results:  Labs: Reviewed    ASSESSMENT/PLAN:     Active Hospital Problems    Diagnosis  POA    *Aftercare following surgery [Z48.89]  Not Applicable    Osteomyelitis of left foot [M86.9]  Yes    Diabetic foot infection [E11.628, L08.9]  Yes    Parastomal hernia [K43.5]  Yes    Diabetes mellitus [E11.9]  Yes    Benign essential HTN [I10]  Yes    ALEX (obstructive sleep apnea) [G47.33]  Yes      Resolved Hospital Problems   No resolved problems to display.       ASSESSMENT:  1. Chronic  osteomyelitis to left foot, polymicrobial (is 5 different organisms including an anaerobe).  He failed conservative (medical management with 8 weeks IV antibiotics) done about 2 months ago.  2. Diabetes, somewhat uncontrolled with an A1c of 7.6%, but improved from earlier this year when was almost 10%.  3. Renal insufficiency, worse since yesterday        PLAN:  1. Continue Zosyn to cover all the organisms.  He will need minimum 6 weeks antibiotic therapy, so through September 9 at least.   2. Consult  to arrange outpatient IV antibiotic therapy.  At home would give continuous infusion of Zosyn at 13.5 g per 24 hours  3.  Aggressive local wound care.

## 2022-08-03 NOTE — SUBJECTIVE & OBJECTIVE
Interval History: Notes diarrhea. Gut function has clearly returned.     Review of Systems   Respiratory:  Negative for shortness of breath.    Cardiovascular:  Negative for chest pain.   Gastrointestinal:  Negative for abdominal distention.   Objective:     Vital Signs (Most Recent):  Temp: 99.2 °F (37.3 °C) (08/03/22 0400)  Pulse: 75 (08/03/22 0400)  Resp: 18 (08/03/22 0400)  BP: (!) 164/91 (08/03/22 0400)  SpO2: 96 % (08/03/22 0400)   Vital Signs (24h Range):  Temp:  [97.2 °F (36.2 °C)-99.3 °F (37.4 °C)] 99.2 °F (37.3 °C)  Pulse:  [71-89] 75  Resp:  [14-20] 18  SpO2:  [96 %-100 %] 96 %  BP: (140-183)/(83-99) 164/91     Weight: 121.1 kg (267 lb)  Body mass index is 35.23 kg/m².    Intake/Output Summary (Last 24 hours) at 8/3/2022 0603  Last data filed at 8/3/2022 0548  Gross per 24 hour   Intake 740 ml   Output 2410 ml   Net -1670 ml      Physical Exam  Vitals reviewed.   Constitutional:       General: He is not in acute distress.     Appearance: He is obese. He is not ill-appearing.   Cardiovascular:      Rate and Rhythm: Normal rate and regular rhythm.      Heart sounds: Normal heart sounds.   Pulmonary:      Effort: Pulmonary effort is normal. No respiratory distress.      Breath sounds: Normal breath sounds. No wheezing.   Abdominal:      General: Bowel sounds are normal. There is no distension.      Tenderness: There is no abdominal tenderness. There is no guarding.   Musculoskeletal:      Right lower leg: No edema.      Left lower leg: No edema.   Skin:     General: Skin is warm and dry.      Comments: Dressing L foot intact.   Neurological:      Mental Status: He is alert.       Significant Labs: All pertinent labs within the past 24 hours have been reviewed.  BMP:   Recent Labs   Lab 08/03/22  0303   *      K 3.2*   *   CO2 25   BUN 13   CREATININE 2.06*   CALCIUM 8.6   MG 1.7     CBC:   Recent Labs   Lab 08/02/22  0347 08/03/22  0303   WBC 8.95 7.37   HGB 10.2* 8.9*   HCT 32.2* 28.1*     000       Significant Imaging: I have reviewed all pertinent imaging results/findings within the past 24 hours.

## 2022-08-03 NOTE — PLAN OF CARE
Problem: Adult Inpatient Plan of Care  Goal: Plan of Care Review  Outcome: Ongoing, Progressing  Goal: Patient-Specific Goal (Individualized)  Outcome: Ongoing, Progressing  Goal: Absence of Hospital-Acquired Illness or Injury  Outcome: Ongoing, Progressing     Problem: Skin Injury Risk Increased  Goal: Skin Health and Integrity  Outcome: Ongoing, Progressing

## 2022-08-03 NOTE — PLAN OF CARE
Problem: Infection  Goal: Absence of Infection Signs and Symptoms  Outcome: Ongoing, Progressing  Intervention: Prevent or Manage Infection  Flowsheets (Taken 8/2/2022 2317)  Fever Reduction/Comfort Measures: lightweight clothing     Problem: Fluid and Electrolyte Imbalance (Acute Kidney Injury/Impairment)  Goal: Fluid and Electrolyte Balance  Outcome: Ongoing, Progressing     Problem: Oral Intake Inadequate (Acute Kidney Injury/Impairment)  Goal: Optimal Nutrition Intake  Outcome: Ongoing, Progressing     Problem: Renal Function Impairment (Acute Kidney Injury/Impairment)  Goal: Effective Renal Function  Outcome: Ongoing, Progressing  Intervention: Monitor and Support Renal Function  Flowsheets (Taken 8/2/2022 2317)  Medication Review/Management: medications reviewed     Problem: Adult Inpatient Plan of Care  Goal: Plan of Care Review  Outcome: Ongoing, Progressing  Flowsheets (Taken 8/2/2022 2317)  Plan of Care Reviewed With: patient  Goal: Patient-Specific Goal (Individualized)  Outcome: Ongoing, Progressing  Flowsheets (Taken 8/2/2022 2317)  Anxieties, Fears or Concerns: picc line placement, pain control,  Individualized Care Needs: antibiotic therapy  Patient-Specific Goals (Include Timeframe): picc line placement by discharge  Goal: Absence of Hospital-Acquired Illness or Injury  Outcome: Ongoing, Progressing  Intervention: Identify and Manage Fall Risk  Flowsheets (Taken 8/2/2022 2317)  Safety Promotion/Fall Prevention: assistive device/personal item within reach  Intervention: Prevent Skin Injury  Flowsheets (Taken 8/2/2022 2317)  Body Position: position changed independently  Skin Protection: adhesive use limited  Intervention: Prevent and Manage VTE (Venous Thromboembolism) Risk  Flowsheets (Taken 8/2/2022 2317)  Activity Management:   Rolling - L1   Ambulated to bathroom - L4   Arm raise - L1  VTE Prevention/Management:   remove, assess skin, and reapply sequential compression device   ambulation  promoted  Range of Motion: active ROM (range of motion) encouraged  Intervention: Prevent Infection  Flowsheets (Taken 8/2/2022 2317)  Infection Prevention:   hand hygiene promoted   personal protective equipment utilized   rest/sleep promoted   single patient room provided  Goal: Optimal Comfort and Wellbeing  Outcome: Ongoing, Progressing  Intervention: Monitor Pain and Promote Comfort  Flowsheets (Taken 8/2/2022 2317)  Pain Management Interventions:   care clustered   pain management plan reviewed with patient/caregiver  Intervention: Provide Person-Centered Care  Flowsheets (Taken 8/2/2022 2317)  Trust Relationship/Rapport: care explained  Goal: Readiness for Transition of Care  Outcome: Ongoing, Progressing

## 2022-08-03 NOTE — ASSESSMENT & PLAN NOTE
08/03 SMITH creatinine 2 vancomycin on hold due to abnormal trough, change lovenox to heparin, dc lisinopril increase norvasc recheck bmp am follow creatinine

## 2022-08-04 LAB
ANION GAP SERPL CALCULATED.3IONS-SCNC: 14 MMOL/L (ref 7–16)
BASOPHILS # BLD AUTO: 0.02 K/UL (ref 0–0.2)
BASOPHILS NFR BLD AUTO: 0.2 % (ref 0–1)
BUN SERPL-MCNC: 14 MG/DL (ref 7–18)
BUN/CREAT SERPL: 8 (ref 6–20)
CALCIUM SERPL-MCNC: 8.9 MG/DL (ref 8.5–10.1)
CHLORIDE SERPL-SCNC: 108 MMOL/L (ref 98–107)
CO2 SERPL-SCNC: 25 MMOL/L (ref 21–32)
CREAT SERPL-MCNC: 1.84 MG/DL (ref 0.7–1.3)
DIFFERENTIAL METHOD BLD: ABNORMAL
EOSINOPHIL # BLD AUTO: 0.36 K/UL (ref 0–0.5)
EOSINOPHIL NFR BLD AUTO: 4.3 % (ref 1–4)
ERYTHROCYTE [DISTWIDTH] IN BLOOD BY AUTOMATED COUNT: 14.2 % (ref 11.5–14.5)
GLUCOSE SERPL-MCNC: 117 MG/DL (ref 74–106)
GLUCOSE SERPL-MCNC: 155 MG/DL (ref 70–105)
GLUCOSE SERPL-MCNC: 335 MG/DL (ref 70–105)
GLUCOSE SERPL-MCNC: 352 MG/DL (ref 70–105)
HCT VFR BLD AUTO: 27.9 % (ref 40–54)
HGB BLD-MCNC: 8.9 G/DL (ref 13.5–18)
IMM GRANULOCYTES # BLD AUTO: 0.06 K/UL (ref 0–0.04)
IMM GRANULOCYTES NFR BLD: 0.7 % (ref 0–0.4)
LYMPHOCYTES # BLD AUTO: 1.96 K/UL (ref 1–4.8)
LYMPHOCYTES NFR BLD AUTO: 23.3 % (ref 27–41)
MAGNESIUM SERPL-MCNC: 1.8 MG/DL (ref 1.7–2.3)
MCH RBC QN AUTO: 29.7 PG (ref 27–31)
MCHC RBC AUTO-ENTMCNC: 31.9 G/DL (ref 32–36)
MCV RBC AUTO: 93 FL (ref 80–96)
MONOCYTES # BLD AUTO: 0.57 K/UL (ref 0–0.8)
MONOCYTES NFR BLD AUTO: 6.8 % (ref 2–6)
MPC BLD CALC-MCNC: 10.3 FL (ref 9.4–12.4)
NEUTROPHILS # BLD AUTO: 5.45 K/UL (ref 1.8–7.7)
NEUTROPHILS NFR BLD AUTO: 64.7 % (ref 53–65)
NRBC # BLD AUTO: 0 X10E3/UL
NRBC, AUTO (.00): 0 %
PHOSPHATE SERPL-MCNC: 4 MG/DL (ref 2.5–4.5)
PLATELET # BLD AUTO: 368 K/UL (ref 150–400)
POTASSIUM SERPL-SCNC: 3.1 MMOL/L (ref 3.5–5.1)
RBC # BLD AUTO: 3 M/UL (ref 4.6–6.2)
SODIUM SERPL-SCNC: 144 MMOL/L (ref 136–145)
WBC # BLD AUTO: 8.42 K/UL (ref 4.5–11)

## 2022-08-04 PROCEDURE — 99232 PR SUBSEQUENT HOSPITAL CARE,LEVL II: ICD-10-PCS | Mod: ,,, | Performed by: FAMILY MEDICINE

## 2022-08-04 PROCEDURE — 63600175 PHARM REV CODE 636 W HCPCS: Performed by: STUDENT IN AN ORGANIZED HEALTH CARE EDUCATION/TRAINING PROGRAM

## 2022-08-04 PROCEDURE — 85025 COMPLETE CBC W/AUTO DIFF WBC: CPT | Performed by: STUDENT IN AN ORGANIZED HEALTH CARE EDUCATION/TRAINING PROGRAM

## 2022-08-04 PROCEDURE — 99232 SBSQ HOSP IP/OBS MODERATE 35: CPT | Mod: ,,, | Performed by: INTERNAL MEDICINE

## 2022-08-04 PROCEDURE — 25000003 PHARM REV CODE 250: Performed by: STUDENT IN AN ORGANIZED HEALTH CARE EDUCATION/TRAINING PROGRAM

## 2022-08-04 PROCEDURE — 25000003 PHARM REV CODE 250: Performed by: NURSE PRACTITIONER

## 2022-08-04 PROCEDURE — 83735 ASSAY OF MAGNESIUM: CPT | Performed by: STUDENT IN AN ORGANIZED HEALTH CARE EDUCATION/TRAINING PROGRAM

## 2022-08-04 PROCEDURE — 63600175 PHARM REV CODE 636 W HCPCS: Performed by: FAMILY MEDICINE

## 2022-08-04 PROCEDURE — 99232 SBSQ HOSP IP/OBS MODERATE 35: CPT | Mod: ,,, | Performed by: FAMILY MEDICINE

## 2022-08-04 PROCEDURE — 84100 ASSAY OF PHOSPHORUS: CPT | Performed by: STUDENT IN AN ORGANIZED HEALTH CARE EDUCATION/TRAINING PROGRAM

## 2022-08-04 PROCEDURE — 94761 N-INVAS EAR/PLS OXIMETRY MLT: CPT

## 2022-08-04 PROCEDURE — 25000003 PHARM REV CODE 250: Performed by: HOSPITALIST

## 2022-08-04 PROCEDURE — 97116 GAIT TRAINING THERAPY: CPT

## 2022-08-04 PROCEDURE — 80048 BASIC METABOLIC PNL TOTAL CA: CPT | Performed by: STUDENT IN AN ORGANIZED HEALTH CARE EDUCATION/TRAINING PROGRAM

## 2022-08-04 PROCEDURE — 99900035 HC TECH TIME PER 15 MIN (STAT)

## 2022-08-04 PROCEDURE — C9113 INJ PANTOPRAZOLE SODIUM, VIA: HCPCS | Performed by: STUDENT IN AN ORGANIZED HEALTH CARE EDUCATION/TRAINING PROGRAM

## 2022-08-04 PROCEDURE — 63600175 PHARM REV CODE 636 W HCPCS: Performed by: NURSE PRACTITIONER

## 2022-08-04 PROCEDURE — 11000001 HC ACUTE MED/SURG PRIVATE ROOM

## 2022-08-04 PROCEDURE — 82962 GLUCOSE BLOOD TEST: CPT

## 2022-08-04 PROCEDURE — C9399 UNCLASSIFIED DRUGS OR BIOLOG: HCPCS | Performed by: FAMILY MEDICINE

## 2022-08-04 PROCEDURE — 99232 PR SUBSEQUENT HOSPITAL CARE,LEVL II: ICD-10-PCS | Mod: ,,, | Performed by: INTERNAL MEDICINE

## 2022-08-04 PROCEDURE — 36415 COLL VENOUS BLD VENIPUNCTURE: CPT | Performed by: STUDENT IN AN ORGANIZED HEALTH CARE EDUCATION/TRAINING PROGRAM

## 2022-08-04 RX ORDER — POTASSIUM CHLORIDE 20 MEQ/1
20 TABLET, EXTENDED RELEASE ORAL 2 TIMES DAILY
Status: COMPLETED | OUTPATIENT
Start: 2022-08-04 | End: 2022-08-04

## 2022-08-04 RX ORDER — MUPIROCIN 20 MG/G
OINTMENT TOPICAL DAILY
Status: DISCONTINUED | OUTPATIENT
Start: 2022-08-04 | End: 2022-08-05 | Stop reason: HOSPADM

## 2022-08-04 RX ORDER — HYDROCODONE BITARTRATE AND ACETAMINOPHEN 7.5; 325 MG/1; MG/1
1 TABLET ORAL EVERY 6 HOURS PRN
Qty: 15 TABLET | Refills: 0 | Status: SHIPPED | OUTPATIENT
Start: 2022-08-04

## 2022-08-04 RX ADMIN — PIPERACILLIN SODIUM AND TAZOBACTAM SODIUM 4.5 G: 4; .5 INJECTION, POWDER, LYOPHILIZED, FOR SOLUTION INTRAVENOUS at 01:08

## 2022-08-04 RX ADMIN — HEPARIN SODIUM 5000 UNITS: 5000 INJECTION INTRAVENOUS; SUBCUTANEOUS at 02:08

## 2022-08-04 RX ADMIN — GABAPENTIN 300 MG: 300 CAPSULE ORAL at 08:08

## 2022-08-04 RX ADMIN — PIPERACILLIN SODIUM AND TAZOBACTAM SODIUM 4.5 G: 4; .5 INJECTION, POWDER, LYOPHILIZED, FOR SOLUTION INTRAVENOUS at 05:08

## 2022-08-04 RX ADMIN — AMLODIPINE BESYLATE 10 MG: 10 TABLET ORAL at 09:08

## 2022-08-04 RX ADMIN — INSULIN ASPART 2 UNITS: 100 INJECTION, SOLUTION INTRAVENOUS; SUBCUTANEOUS at 06:08

## 2022-08-04 RX ADMIN — HEPARIN SODIUM 5000 UNITS: 5000 INJECTION INTRAVENOUS; SUBCUTANEOUS at 09:08

## 2022-08-04 RX ADMIN — PANTOPRAZOLE SODIUM 40 MG: 40 INJECTION, POWDER, FOR SOLUTION INTRAVENOUS at 09:08

## 2022-08-04 RX ADMIN — MUPIROCIN: 20 OINTMENT TOPICAL at 09:08

## 2022-08-04 RX ADMIN — POTASSIUM CHLORIDE 20 MEQ: 20 TABLET, EXTENDED RELEASE ORAL at 08:08

## 2022-08-04 RX ADMIN — SODIUM CHLORIDE, POTASSIUM CHLORIDE, SODIUM LACTATE AND CALCIUM CHLORIDE: 600; 310; 30; 20 INJECTION, SOLUTION INTRAVENOUS at 12:08

## 2022-08-04 RX ADMIN — INSULIN ASPART 5 UNITS: 100 INJECTION, SOLUTION INTRAVENOUS; SUBCUTANEOUS at 07:08

## 2022-08-04 RX ADMIN — PIPERACILLIN SODIUM AND TAZOBACTAM SODIUM 4.5 G: 4; .5 INJECTION, POWDER, LYOPHILIZED, FOR SOLUTION INTRAVENOUS at 09:08

## 2022-08-04 RX ADMIN — INSULIN ASPART 5 UNITS: 100 INJECTION, SOLUTION INTRAVENOUS; SUBCUTANEOUS at 12:08

## 2022-08-04 RX ADMIN — INSULIN ASPART 5 UNITS: 100 INJECTION, SOLUTION INTRAVENOUS; SUBCUTANEOUS at 05:08

## 2022-08-04 RX ADMIN — GABAPENTIN 300 MG: 300 CAPSULE ORAL at 09:08

## 2022-08-04 RX ADMIN — CARVEDILOL 25 MG: 25 TABLET, FILM COATED ORAL at 05:08

## 2022-08-04 RX ADMIN — POTASSIUM CHLORIDE 20 MEQ: 20 TABLET, EXTENDED RELEASE ORAL at 09:08

## 2022-08-04 RX ADMIN — INSULIN DETEMIR 27 UNITS: 100 INJECTION, SOLUTION SUBCUTANEOUS at 08:08

## 2022-08-04 RX ADMIN — CARVEDILOL 25 MG: 25 TABLET, FILM COATED ORAL at 09:08

## 2022-08-04 RX ADMIN — HEPARIN SODIUM 5000 UNITS: 5000 INJECTION INTRAVENOUS; SUBCUTANEOUS at 05:08

## 2022-08-04 NOTE — PROGRESS NOTES
GEN SURGERY    Ambulating in hallway with PT on rounds  SS has been consulted for home iv abx zosyn 13.5/24 hours per ID recs  Nurse denies any reported problems with patient  Creatine  Slightly improved 1.84 K 3.1 replace po   no leukocytosis

## 2022-08-04 NOTE — PLAN OF CARE
MÓNICA consulted for iv abx again. SW made referral to [facility] on yesterday and is awaiting insurance approval. SW following.

## 2022-08-04 NOTE — SUBJECTIVE & OBJECTIVE
Interval History: Diarrhea improved. He feels better.     Review of Systems   Respiratory:  Negative for chest tightness and shortness of breath.    Cardiovascular:  Negative for chest pain and palpitations.   Objective:     Vital Signs (Most Recent):  Temp: 98 °F (36.7 °C) (08/03/22 1930)  Pulse: 76 (08/03/22 1930)  Resp: 18 (08/03/22 1930)  BP: (!) 153/94 (08/03/22 1930)  SpO2: 98 % (08/03/22 1930)   Vital Signs (24h Range):  Temp:  [97.2 °F (36.2 °C)-99.3 °F (37.4 °C)] 98 °F (36.7 °C)  Pulse:  [75-85] 76  Resp:  [14-18] 18  SpO2:  [96 %-99 %] 98 %  BP: (153-171)/() 153/94     Weight: 121.1 kg (267 lb)  Body mass index is 35.23 kg/m².    Intake/Output Summary (Last 24 hours) at 8/3/2022 2000  Last data filed at 8/3/2022 1810  Gross per 24 hour   Intake 740 ml   Output 2125 ml   Net -1385 ml      Physical Exam  Vitals reviewed.   Constitutional:       General: He is not in acute distress.     Appearance: He is obese. He is not toxic-appearing.   Eyes:      General: No scleral icterus.  Cardiovascular:      Rate and Rhythm: Normal rate and regular rhythm.      Heart sounds: Normal heart sounds.   Pulmonary:      Effort: Pulmonary effort is normal. No respiratory distress.      Breath sounds: Normal breath sounds. No wheezing.   Abdominal:      General: Abdomen is flat. Bowel sounds are normal. There is no distension.      Palpations: Abdomen is soft.   Skin:     General: Skin is warm and dry.      Comments: Dressing L foot.    Neurological:      Mental Status: He is alert.       Significant Labs: All pertinent labs within the past 24 hours have been reviewed.  BMP:   Recent Labs   Lab 08/03/22  0303   *      K 3.2*   *   CO2 25   BUN 13   CREATININE 2.06*   CALCIUM 8.6   MG 1.7     CBC:   Recent Labs   Lab 08/02/22  0347 08/03/22  0303   WBC 8.95 7.37   HGB 10.2* 8.9*   HCT 32.2* 28.1*    492       Significant Imaging: I have reviewed all pertinent imaging results/findings within the  past 24 hours.

## 2022-08-04 NOTE — PROGRESS NOTES
08/04/22 0836   Wound Care Follow Up   Wound Care Follow-up? Yes   Wound Care- Next Visit Date 08/08/22   Follow Up Plan reassess wound, change wound vacs - nurses are responsible for wound vac changes on Thursdays

## 2022-08-04 NOTE — PLAN OF CARE
Problem: Infection  Goal: Absence of Infection Signs and Symptoms  Outcome: Ongoing, Progressing     Problem: Fluid and Electrolyte Imbalance (Acute Kidney Injury/Impairment)  Goal: Fluid and Electrolyte Balance  Outcome: Ongoing, Progressing     Problem: Oral Intake Inadequate (Acute Kidney Injury/Impairment)  Goal: Optimal Nutrition Intake  Outcome: Ongoing, Progressing     Problem: Renal Function Impairment (Acute Kidney Injury/Impairment)  Goal: Effective Renal Function  Outcome: Ongoing, Progressing     Problem: Adult Inpatient Plan of Care  Goal: Plan of Care Review  Outcome: Ongoing, Progressing  Goal: Patient-Specific Goal (Individualized)  Outcome: Ongoing, Progressing  Goal: Absence of Hospital-Acquired Illness or Injury  Outcome: Ongoing, Progressing  Goal: Optimal Comfort and Wellbeing  Outcome: Ongoing, Progressing  Goal: Readiness for Transition of Care  Outcome: Ongoing, Progressing     Problem: Adult Inpatient Plan of Care  Goal: Patient-Specific Goal (Individualized)  Outcome: Ongoing, Progressing

## 2022-08-04 NOTE — PLAN OF CARE
Problem: Infection  Goal: Absence of Infection Signs and Symptoms  Outcome: Ongoing, Progressing  Intervention: Prevent or Manage Infection  Flowsheets (Taken 8/4/2022 0340)  Fever Reduction/Comfort Measures: lightweight clothing     Problem: Fluid and Electrolyte Imbalance (Acute Kidney Injury/Impairment)  Goal: Fluid and Electrolyte Balance  Outcome: Ongoing, Progressing     Problem: Oral Intake Inadequate (Acute Kidney Injury/Impairment)  Goal: Optimal Nutrition Intake  Outcome: Ongoing, Progressing     Problem: Renal Function Impairment (Acute Kidney Injury/Impairment)  Goal: Effective Renal Function  Outcome: Ongoing, Progressing  Intervention: Monitor and Support Renal Function  Flowsheets (Taken 8/4/2022 0340)  Medication Review/Management: medications reviewed     Problem: Adult Inpatient Plan of Care  Goal: Plan of Care Review  Outcome: Ongoing, Progressing  Flowsheets (Taken 8/4/2022 0340)  Plan of Care Reviewed With: patient  Goal: Patient-Specific Goal (Individualized)  Outcome: Ongoing, Progressing  Flowsheets (Taken 8/4/2022 0340)  Anxieties, Fears or Concerns: wound care  Individualized Care Needs: wound care  Patient-Specific Goals (Include Timeframe): wound care education by discharge  Goal: Absence of Hospital-Acquired Illness or Injury  Outcome: Ongoing, Progressing  Intervention: Identify and Manage Fall Risk  Flowsheets (Taken 8/4/2022 0340)  Safety Promotion/Fall Prevention:   assistive device/personal item within reach   bed alarm set  Intervention: Prevent Skin Injury  Flowsheets (Taken 8/4/2022 0340)  Body Position: position changed independently  Skin Protection: adhesive use limited  Intervention: Prevent and Manage VTE (Venous Thromboembolism) Risk  Flowsheets (Taken 8/4/2022 0340)  Activity Management:   Rolling - L1   Arm raise - L1  VTE Prevention/Management: ambulation promoted  Range of Motion: active ROM (range of motion) encouraged  Intervention: Prevent Infection  Flowsheets  (Taken 8/4/2022 0340)  Infection Prevention:   hand hygiene promoted   personal protective equipment utilized   rest/sleep promoted   single patient room provided  Goal: Optimal Comfort and Wellbeing  Outcome: Ongoing, Progressing  Intervention: Monitor Pain and Promote Comfort  Flowsheets (Taken 8/4/2022 0340)  Pain Management Interventions:   care clustered   pain management plan reviewed with patient/caregiver  Intervention: Provide Person-Centered Care  Flowsheets (Taken 8/4/2022 0340)  Trust Relationship/Rapport: care explained  Goal: Readiness for Transition of Care  Outcome: Ongoing, Progressing     Problem: Adult Inpatient Plan of Care  Goal: Plan of Care Review  Outcome: Ongoing, Progressing  Flowsheets (Taken 8/4/2022 0340)  Plan of Care Reviewed With: patient  Goal: Patient-Specific Goal (Individualized)  Outcome: Ongoing, Progressing  Flowsheets (Taken 8/4/2022 0340)  Anxieties, Fears or Concerns: wound care  Individualized Care Needs: wound care  Patient-Specific Goals (Include Timeframe): wound care education by discharge  Goal: Absence of Hospital-Acquired Illness or Injury  Outcome: Ongoing, Progressing  Intervention: Identify and Manage Fall Risk  Flowsheets (Taken 8/4/2022 0340)  Safety Promotion/Fall Prevention:   assistive device/personal item within reach   bed alarm set  Intervention: Prevent Skin Injury  Flowsheets (Taken 8/4/2022 0340)  Body Position: position changed independently  Skin Protection: adhesive use limited  Intervention: Prevent and Manage VTE (Venous Thromboembolism) Risk  Flowsheets (Taken 8/4/2022 0340)  Activity Management:   Rolling - L1   Arm raise - L1  VTE Prevention/Management: ambulation promoted  Range of Motion: active ROM (range of motion) encouraged  Intervention: Prevent Infection  Flowsheets (Taken 8/4/2022 0340)  Infection Prevention:   hand hygiene promoted   personal protective equipment utilized   rest/sleep promoted   single patient room provided  Goal:  Optimal Comfort and Wellbeing  Outcome: Ongoing, Progressing  Intervention: Monitor Pain and Promote Comfort  Flowsheets (Taken 8/4/2022 0340)  Pain Management Interventions:   care clustered   pain management plan reviewed with patient/caregiver  Intervention: Provide Person-Centered Care  Flowsheets (Taken 8/4/2022 0340)  Trust Relationship/Rapport: care explained  Goal: Readiness for Transition of Care  Outcome: Ongoing, Progressing     Problem: Diabetes Comorbidity  Goal: Blood Glucose Level Within Targeted Range  Outcome: Ongoing, Progressing     Problem: Bariatric Environmental Safety  Goal: Safety Maintained with Care  Outcome: Ongoing, Progressing     Problem: Impaired Wound Healing  Goal: Optimal Wound Healing  Outcome: Ongoing, Progressing     Problem: Fall Injury Risk  Goal: Absence of Fall and Fall-Related Injury  Outcome: Ongoing, Progressing  Intervention: Identify and Manage Contributors  Flowsheets (Taken 8/4/2022 0340)  Self-Care Promotion: independence encouraged  Medication Review/Management: medications reviewed  Intervention: Promote Injury-Free Environment  Flowsheets (Taken 8/4/2022 0340)  Safety Promotion/Fall Prevention:   assistive device/personal item within reach   bed alarm set     Problem: Skin Injury Risk Increased  Goal: Skin Health and Integrity  Outcome: Ongoing, Progressing  Intervention: Optimize Skin Protection  Flowsheets (Taken 8/4/2022 0340)  Pressure Reduction Techniques: frequent weight shift encouraged  Skin Protection: adhesive use limited  Head of Bed (HOB) Positioning: HOB at 20-30 degrees

## 2022-08-04 NOTE — NURSING
AM meds taken without difficulty, 575ml clear urine emptied from urinal, ambulating in hallway with PT, tolerating well, zero requests, encouraged to call with needs.

## 2022-08-04 NOTE — PLAN OF CARE
MÓNICA spoke with Anurag at [facility] and pts iv abx has been approved. NP, Geno approved. MÓNICA will follow up in a.m. MÓNICA following.

## 2022-08-04 NOTE — PT/OT/SLP PROGRESS
"Physical Therapy Treatment    Patient Name:  Chaim Johnson   MRN:  [id number]    Recommendations:     Discharge Recommendations:   (to be determined by progress and pt arranging a new place to live)   Discharge Equipment Recommendations: walker, rolling   Barriers to discharge:  IV ABX    Assessment:     Chaim Johnson is a 46 y.o. male admitted with a medical diagnosis of Aftercare following surgery.  He presents with the following impairments/functional limitations:    impaired skin, decreased indep with functional mob.    Rehab Prognosis: Good; patient would benefit from acute skilled PT services to address these deficits and reach maximum level of function.    Recent Surgery: Procedure(s) (LRB):  DEBRIDEMENT, LOWER EXTREMITY (Left) 5 Days Post-Op    Plan:     During this hospitalization, patient to be seen 5 x/week to address the identified rehab impairments via gait training, therapeutic activities, therapeutic exercises and progress toward the following goals:    Plan of Care Expires:  08/29/22    Subjective     Chief Complaint: none  Patient/Family Comments/goals: "I hope they can get my antibiotics straight so I can go home"  Pain/Comfort:         Objective:     Communicated with nursing prior to session.  Patient found sitting edge of bed with   upon PT entry to room.     General Precautions: Standard, fall   Orthopedic Precautions: (heel WB right LE)   Braces:    Respiratory Status: Room air     Functional Mobility:  Transfers:     Sit to Stand:  modified independence with no AD  Gait: 500-600ft with V pole, supervision  Balance: Good in stance      AM-PAC 6 CLICK MOBILITY          Therapeutic Activities and Exercises:   Mobility as stated above. Pt reports being indep with HEP.    Patient left up in chair with all lines intact and call button in reach..    GOALS:   Multidisciplinary Problems       Physical Therapy Goals          Problem: Physical Therapy    Goal Priority Disciplines Outcome Goal Variances " Interventions   Physical Therapy Goal     PT, PT/OT Ongoing, Progressing     Description: Short Term Goals to be met by: 2022    Patient will increase functional independence with mobility by performin. Supine to sit with independently  2. Sit to stand transfer with independently using Rolling walker and heel WB right LE  3. Bed to chair transfer with independently using Rolling walker and heel WB right LE  4. Gait  x 100 feet with independently using Rolling walker and heel WB right LE    Long Term Goals to be met by: 2022    Pt will regain full independent functional mobility with lowest level of assistive device to return to home situation and prior activities of daily living.                        Time Tracking:     PT Received On: 22  PT Start Time: 923     PT Stop Time: 947  PT Total Time (min): 24 min     Billable Minutes: Gait Training 15             PTA Visit Number: 2     2022

## 2022-08-04 NOTE — PROGRESS NOTES
Progress Note                                                           Infectious disease   Admit Date: 7/28/2022    SUBJECTIVE:     Follow-up For:  Aftercare following surgery    HPI/Interval history:  Patient it is doing well, no problems with Zosyn.  Afebrile.    Review of Systems:    Constitutional: no fever or chills, no change in baseline mental state   Eyes: no visual changes. No redness or jaundice, no blurred vision   ENT: no nasal congestion or sore throat, no nose bleeds, no ear ache   Respiratory: no cough or shortness of breath, no wheezing, no stridor   Gastrointestinal: no nausea / vomiting / diarrhea, no abdominal pain, no constipation   Genitourinary: no hematuria or dysuria, no frequency or urgency   Hematologic/Lymphatic: no easy bruising or lymphadenopathy   Musculoskeletal: no arthralgias or myalgias, no weakness       OBJECTIVE:     Vital Signs Range (Last 24H):  Temp:  [97.2 °F (36.2 °C)-98.4 °F (36.9 °C)]   Pulse:  [75-99]   Resp:  [14-18]   BP: (153-188)/(89-99)   SpO2:  [97 %-99 %]     Physical Exam:  Constitutional:  No acute distress, AAO x 3   HENT: supple   Head: normocephalic, atraumatic   Eyes: Conjunctivae normal. Pupils are equal, round, and reactive to light and accomodation, no drainage   Cardiovascular: regular rate and rhythm, S1, S2 normal, no murmur, click, rub or gallop  Pulmonary: normal respiratory effort, no crepitations or wheezing  Gastrointestinal: non-distended, bowel sounds normal; non-tender, no masses or organomegaly appreciated  Muscular/Skeletal:  Left foot bandaged.  Skin: Skin color, texture normal. No rashes, ulcers or new lesions. Skin warm and dry.   Psychiatric: Normal mood and affect. Normal behavior.    Laboratory:  CBC:   Recent Labs   Lab 08/04/22 0228   WBC 8.42   RBC 3.00*   HGB 8.9*   HCT 27.9*      MCV 93.0   MCH 29.7   MCHC 31.9*     BMP:   Recent Labs   Lab 08/04/22 0228   *      K 3.1*   *   CO2 25   BUN 14    CREATININE 1.84*   CALCIUM 8.9   MG 1.8     CMP:   Recent Labs   Lab 08/04/22 0228   *   CALCIUM 8.9      K 3.1*   CO2 25   *   BUN 14   CREATININE 1.84*     Microbiology Results (last 7 days)     Procedure Component Value Units Date/Time    Blood culture [[id number]] Collected: 08/01/22 1426    Order Status: Completed Specimen: Blood Updated: 08/04/22 0720     Culture, Blood No Growth At 48 Hours    Blood culture [[id number]] Collected: 08/01/22 1426    Order Status: Completed Specimen: Blood Updated: 08/04/22 0720     Culture, Blood No Growth At 48 Hours    Culture, Anaerobe [[id number]]  (Abnormal) Collected: 07/30/22 1234    Order Status: Completed Specimen: Wound from Foot, Left Updated: 08/03/22 1329     Culture, Anaerobe PREVOTELLA SPECIES    Culture, Wound [[id number]]  (Abnormal)  (Susceptibility) Collected: 07/30/22 1234    Order Status: Completed Specimen: Wound from Foot, Left Updated: 08/02/22 0942     Culture, Wound/Abscess Moderate Growth Pseudomonas aeruginosa      Heavy Growth Enterococcus faecalis      Moderate Growth Klebsiella pneumoniae      Moderate Growth Escherichia coli    Culture, Bone [[id number]]  (Abnormal)  (Susceptibility) Collected: 07/30/22 1234    Order Status: Completed Specimen: Bone from Foot, Left Updated: 08/02/22 0936     Culture, Bone Heavy Growth Escherichia coli      Heavy Growth Enterococcus faecalis      Heavy Growth Klebsiella pneumoniae          Diagnostic Results:  Labs: Reviewed    ASSESSMENT/PLAN:     Active Hospital Problems    Diagnosis  POA    *Aftercare following surgery [Z48.89]  Not Applicable    Osteomyelitis of left foot [M86.9]  Yes    Diabetic foot infection [E11.628, L08.9]  Yes    Parastomal hernia [K43.5]  Yes    Diabetes mellitus [E11.9]  Yes    Benign essential HTN [I10]  Yes    ALEX (obstructive sleep apnea) [G47.33]  Yes    Acute renal failure superimposed on stage 3a chronic kidney disease [N17.9, N18.31]  Yes      Resolved  Hospital Problems   No resolved problems to display.       ASSESSMENT:  1. Chronic osteomyelitis to left foot, polymicrobial (is 5 different organisms including an anaerobe).  He failed conservative (medical management with 8 weeks IV antibiotics) done about 2 months ago.  2. Diabetes, somewhat uncontrolled with an A1c of 7.6%, but improved from earlier this year when was almost 10%.  3. Renal insufficiency, a bit improved today        PLAN:  1. Continue Zosyn to cover all the organisms isolated from his wound.  He will need minimum 6 weeks antibiotic therapy, so through September 9 at least.   2.  trying to approve home IV antibiotic therapy.  At home would give continuous infusion of Zosyn at 13.5 g per 24 hours for convenience.  3.  Aggressive local wound care.

## 2022-08-04 NOTE — ASSESSMENT & PLAN NOTE
Continue zosyn. Was on Vanc but held due to high levels. Now cultures back, polymicrobial, all sensitive to zosyn. Will not resume vanc.  Home IV abx if able.

## 2022-08-04 NOTE — PROGRESS NOTES
[facility] Hospital Medicine  Progress Note    Patient Name: Chaim Johnson  MRN: [id number]  Patient Class: IP- Surgery Admit   Admission Date: 7/28/2022  Length of Stay: 6 days  Attending Physician: Tripp Palmer DO  Primary Care Provider: LANDON Evans        Subjective:     Principal Problem:Aftercare following surgery        HPI:  47 yo M underwent colostomy closure, MARIAM, appy and parasternal hernia repair today.  He is c/o pain.  He is about to have NGT placed to LIS.  He has ALEX but does not want a CPAP at this time q hs due to NGT and due to risk of N/V and aspiration this is probably a good idea.  Patient has had necrotizing fascitis of left thigh and has transmetarsal amputation of left foot with diabetic foot ulcer.  He follows in [place] for wound care with Rivas Stafford MD with [facility] [place], operated by [facility].  He is on full dose anticoagulation with eliquis which is being held at this time.  He does not know why he is on anticoagulation and I can find no record in hospital chart.      Patient had SMITH when he had alcoholic pancreatitis last year and follows with Dr. Flores and creat is now 1.3 and was previously 4.3.  He has been able to restart his glucophage and lisinopril.  Dr. Palmer is holding ACEI and CCB but continuing BB post op and monitoring BP closely.        Overview/Hospital Course:  07/29 Patient complains of abdominal pain. Pain pump in place. NGT in place. Accucheck 155.POD#1.  07/30 OR today for debridement of right foot ulcer. NGT in place.   07/31 Patient voices no complaints. NG removed. Wound care. Accuchek 134    08/01 Records reviewed.   OR procedure 07/28 closure colostomy, repair hernia, appendectomy  OR procedure 07/30 debridement left lower extremity          Interval History: Diarrhea improved. He feels better.     Review of Systems   Respiratory:  Negative for chest tightness and shortness of breath.    Cardiovascular:  Negative for chest  pain and palpitations.   Objective:     Vital Signs (Most Recent):  Temp: 98 °F (36.7 °C) (08/03/22 1930)  Pulse: 76 (08/03/22 1930)  Resp: 18 (08/03/22 1930)  BP: (!) 153/94 (08/03/22 1930)  SpO2: 98 % (08/03/22 1930)   Vital Signs (24h Range):  Temp:  [97.2 °F (36.2 °C)-99.3 °F (37.4 °C)] 98 °F (36.7 °C)  Pulse:  [75-85] 76  Resp:  [14-18] 18  SpO2:  [96 %-99 %] 98 %  BP: (153-171)/() 153/94     Weight: 121.1 kg (267 lb)  Body mass index is 35.23 kg/m².    Intake/Output Summary (Last 24 hours) at 8/3/2022 2000  Last data filed at 8/3/2022 1810  Gross per 24 hour   Intake 740 ml   Output 2125 ml   Net -1385 ml      Physical Exam  Vitals reviewed.   Constitutional:       General: He is not in acute distress.     Appearance: He is obese. He is not toxic-appearing.   Eyes:      General: No scleral icterus.  Cardiovascular:      Rate and Rhythm: Normal rate and regular rhythm.      Heart sounds: Normal heart sounds.   Pulmonary:      Effort: Pulmonary effort is normal. No respiratory distress.      Breath sounds: Normal breath sounds. No wheezing.   Abdominal:      General: Abdomen is flat. Bowel sounds are normal. There is no distension.      Palpations: Abdomen is soft.   Skin:     General: Skin is warm and dry.      Comments: Dressing L foot.    Neurological:      Mental Status: He is alert.       Significant Labs: All pertinent labs within the past 24 hours have been reviewed.  BMP:   Recent Labs   Lab 08/03/22  0303   *      K 3.2*   *   CO2 25   BUN 13   CREATININE 2.06*   CALCIUM 8.6   MG 1.7     CBC:   Recent Labs   Lab 08/02/22  0347 08/03/22  0303   WBC 8.95 7.37   HGB 10.2* 8.9*   HCT 32.2* 28.1*    362       Significant Imaging: I have reviewed all pertinent imaging results/findings within the past 24 hours.      Assessment/Plan:      * Aftercare following surgery  Defer to primary team.        Diabetes mellitus  Will increase mealtime insulin and levemir slightly    Diabetic  foot infection  Continue zosyn. Was on Vanc but held due to high levels. Now cultures back, polymicrobial, all sensitive to zosyn. Will not resume vanc.  Home IV abx if able.     Osteomyelitis of left foot    Polymicrobial.  ID consultation for antibiotic recommendation.     Acute renal failure superimposed on stage 3a chronic kidney disease    SMITH possibly due to high vanc levels. Monitoring.     ALEX (obstructive sleep apnea)  Holding CPAP until NGT discontinued and no longer aspiration risk.       Benign essential HTN  Holding ACE and CCB at present.  Restart when BP tolerates  Continue BB.         VTE Risk Mitigation (From admission, onward)         Ordered     heparin (porcine) injection 5,000 Units  Every 8 hours         08/03/22 0816     IP VTE HIGH RISK PATIENT  Once         07/28/22 1754     Place sequential compression device  Until discontinued         07/28/22 1754     Place sequential compression device  Until discontinued         07/28/22 1754                Discharge Planning   KASSIE:      Code Status: Full Code   Is the patient medically ready for discharge?:     Reason for patient still in hospital (select all that apply): Treatment  Discharge Plan A: Home Health ([facility])   Discharge Delays: None known at this time              Rj Aceves Jr, MD  Department of Hospital Medicine   [facility]

## 2022-08-05 VITALS
DIASTOLIC BLOOD PRESSURE: 100 MMHG | SYSTOLIC BLOOD PRESSURE: 147 MMHG | OXYGEN SATURATION: 99 % | TEMPERATURE: 97 F | WEIGHT: 267 LBS | HEIGHT: 73 IN | BODY MASS INDEX: 35.39 KG/M2 | RESPIRATION RATE: 18 BRPM | HEART RATE: 98 BPM

## 2022-08-05 LAB
ANION GAP SERPL CALCULATED.3IONS-SCNC: 11 MMOL/L (ref 7–16)
BUN SERPL-MCNC: 20 MG/DL (ref 7–18)
BUN/CREAT SERPL: 10 (ref 6–20)
CALCIUM SERPL-MCNC: 8.7 MG/DL (ref 8.5–10.1)
CHLORIDE SERPL-SCNC: 105 MMOL/L (ref 98–107)
CO2 SERPL-SCNC: 26 MMOL/L (ref 21–32)
CREAT SERPL-MCNC: 2.1 MG/DL (ref 0.7–1.3)
GLUCOSE SERPL-MCNC: 303 MG/DL (ref 70–105)
GLUCOSE SERPL-MCNC: 308 MG/DL (ref 70–105)
GLUCOSE SERPL-MCNC: 331 MG/DL (ref 70–105)
GLUCOSE SERPL-MCNC: 337 MG/DL (ref 74–106)
POTASSIUM SERPL-SCNC: 3.4 MMOL/L (ref 3.5–5.1)
SODIUM SERPL-SCNC: 139 MMOL/L (ref 136–145)

## 2022-08-05 PROCEDURE — 25000003 PHARM REV CODE 250: Performed by: STUDENT IN AN ORGANIZED HEALTH CARE EDUCATION/TRAINING PROGRAM

## 2022-08-05 PROCEDURE — 36415 COLL VENOUS BLD VENIPUNCTURE: CPT | Performed by: NURSE PRACTITIONER

## 2022-08-05 PROCEDURE — 99900035 HC TECH TIME PER 15 MIN (STAT)

## 2022-08-05 PROCEDURE — 80048 BASIC METABOLIC PNL TOTAL CA: CPT | Performed by: NURSE PRACTITIONER

## 2022-08-05 PROCEDURE — 94761 N-INVAS EAR/PLS OXIMETRY MLT: CPT

## 2022-08-05 PROCEDURE — 25000003 PHARM REV CODE 250: Performed by: HOSPITALIST

## 2022-08-05 PROCEDURE — 82962 GLUCOSE BLOOD TEST: CPT

## 2022-08-05 PROCEDURE — 63600175 PHARM REV CODE 636 W HCPCS: Performed by: STUDENT IN AN ORGANIZED HEALTH CARE EDUCATION/TRAINING PROGRAM

## 2022-08-05 PROCEDURE — 99024 POSTOP FOLLOW-UP VISIT: CPT | Mod: ,,, | Performed by: NURSE PRACTITIONER

## 2022-08-05 PROCEDURE — 25000003 PHARM REV CODE 250: Performed by: NURSE PRACTITIONER

## 2022-08-05 PROCEDURE — 63600175 PHARM REV CODE 636 W HCPCS: Performed by: FAMILY MEDICINE

## 2022-08-05 PROCEDURE — 63600175 PHARM REV CODE 636 W HCPCS: Performed by: NURSE PRACTITIONER

## 2022-08-05 PROCEDURE — 82310 ASSAY OF CALCIUM: CPT | Performed by: NURSE PRACTITIONER

## 2022-08-05 PROCEDURE — 99024 PR POST-OP FOLLOW-UP VISIT: ICD-10-PCS | Mod: ,,, | Performed by: NURSE PRACTITIONER

## 2022-08-05 RX ADMIN — SODIUM CHLORIDE, POTASSIUM CHLORIDE, SODIUM LACTATE AND CALCIUM CHLORIDE: 600; 310; 30; 20 INJECTION, SOLUTION INTRAVENOUS at 03:08

## 2022-08-05 RX ADMIN — INSULIN ASPART 5 UNITS: 100 INJECTION, SOLUTION INTRAVENOUS; SUBCUTANEOUS at 11:08

## 2022-08-05 RX ADMIN — CARVEDILOL 25 MG: 25 TABLET, FILM COATED ORAL at 07:08

## 2022-08-05 RX ADMIN — PIPERACILLIN SODIUM AND TAZOBACTAM SODIUM 4.5 G: 4; .5 INJECTION, POWDER, LYOPHILIZED, FOR SOLUTION INTRAVENOUS at 06:08

## 2022-08-05 RX ADMIN — INSULIN ASPART 5 UNITS: 100 INJECTION, SOLUTION INTRAVENOUS; SUBCUTANEOUS at 07:08

## 2022-08-05 RX ADMIN — MUPIROCIN: 20 OINTMENT TOPICAL at 09:08

## 2022-08-05 RX ADMIN — HEPARIN SODIUM 5000 UNITS: 5000 INJECTION INTRAVENOUS; SUBCUTANEOUS at 06:08

## 2022-08-05 RX ADMIN — GABAPENTIN 300 MG: 300 CAPSULE ORAL at 09:08

## 2022-08-05 RX ADMIN — HYDROCODONE BITARTRATE AND ACETAMINOPHEN 1 TABLET: 7.5; 325 TABLET ORAL at 04:08

## 2022-08-05 RX ADMIN — AMLODIPINE BESYLATE 10 MG: 10 TABLET ORAL at 09:08

## 2022-08-05 NOTE — SUBJECTIVE & OBJECTIVE
Interval History: Diarrhea improved. He feels better.     Review of Systems   Respiratory:  Negative for chest tightness and shortness of breath.    Cardiovascular:  Negative for chest pain and palpitations.   Objective:     Vital Signs (Most Recent):  Temp: 98.3 °F (36.8 °C) (08/04/22 1909)  Pulse: 92 (08/04/22 1909)  Resp: 16 (08/04/22 1909)  BP: (!) 185/96 (08/04/22 1909)  SpO2: 96 % (08/04/22 1909)   Vital Signs (24h Range):  Temp:  [97.7 °F (36.5 °C)-98.7 °F (37.1 °C)] 98.3 °F (36.8 °C)  Pulse:  [75-99] 92  Resp:  [16-18] 16  SpO2:  [96 %-98 %] 96 %  BP: (155-188)/(89-99) 185/96     Weight: 121.1 kg (267 lb)  Body mass index is 35.23 kg/m².    Intake/Output Summary (Last 24 hours) at 8/4/2022 2028  Last data filed at 8/3/2022 2335  Gross per 24 hour   Intake 100 ml   Output 1550 ml   Net -1450 ml        Physical Exam  Vitals reviewed.   Constitutional:       General: He is not in acute distress.     Appearance: He is obese. He is not toxic-appearing.   Eyes:      General: No scleral icterus.  Cardiovascular:      Rate and Rhythm: Normal rate and regular rhythm.      Heart sounds: Normal heart sounds.   Pulmonary:      Effort: Pulmonary effort is normal. No respiratory distress.      Breath sounds: Normal breath sounds. No wheezing.   Abdominal:      General: Abdomen is flat. Bowel sounds are normal. There is no distension.      Palpations: Abdomen is soft.   Skin:     General: Skin is warm and dry.      Comments: Dressing L foot.    Neurological:      Mental Status: He is alert.       Significant Labs: All pertinent labs within the past 24 hours have been reviewed.  BMP:   Recent Labs   Lab 08/04/22 0228   *      K 3.1*   *   CO2 25   BUN 14   CREATININE 1.84*   CALCIUM 8.9   MG 1.8       CBC:   Recent Labs   Lab 08/03/22  0303 08/04/22 0228   WBC 7.37 8.42   HGB 8.9* 8.9*   HCT 28.1* 27.9*    368         Significant Imaging: I have reviewed all pertinent imaging results/findings  within the past 24 hours.

## 2022-08-05 NOTE — ASSESSMENT & PLAN NOTE
Didn't get levemir this am and glucoses have been higher . Will give one time dose tonight and he will need to resume his home Tresiba tomorrow night.

## 2022-08-05 NOTE — PLAN OF CARE
Problem: Infection  Goal: Absence of Infection Signs and Symptoms  Outcome: Ongoing, Progressing  Intervention: Prevent or Manage Infection  Flowsheets (Taken 8/5/2022 0128)  Fever Reduction/Comfort Measures: lightweight clothing     Problem: Fluid and Electrolyte Imbalance (Acute Kidney Injury/Impairment)  Goal: Fluid and Electrolyte Balance  Outcome: Ongoing, Progressing     Problem: Oral Intake Inadequate (Acute Kidney Injury/Impairment)  Goal: Optimal Nutrition Intake  Outcome: Ongoing, Progressing  Intervention: Promote and Optimize Nutrition  Flowsheets (Taken 8/5/2022 0128)  Oral Nutrition Promotion: rest periods promoted     Problem: Renal Function Impairment (Acute Kidney Injury/Impairment)  Goal: Effective Renal Function  Outcome: Ongoing, Progressing  Intervention: Monitor and Support Renal Function  Flowsheets (Taken 8/5/2022 0128)  Medication Review/Management: medications reviewed     Problem: Adult Inpatient Plan of Care  Goal: Plan of Care Review  Outcome: Ongoing, Progressing  Flowsheets (Taken 8/5/2022 0128)  Plan of Care Reviewed With: patient  Goal: Patient-Specific Goal (Individualized)  Outcome: Ongoing, Progressing  Flowsheets (Taken 8/5/2022 0128)  Anxieties, Fears or Concerns: wound care  Individualized Care Needs: wound care  Patient-Specific Goals (Include Timeframe): `discharge with antibiotic therapy  Goal: Absence of Hospital-Acquired Illness or Injury  Outcome: Ongoing, Progressing  Intervention: Identify and Manage Fall Risk  Flowsheets (Taken 8/5/2022 0128)  Safety Promotion/Fall Prevention: assistive device/personal item within reach  Intervention: Prevent Skin Injury  Flowsheets (Taken 8/5/2022 0128)  Body Position: position changed independently  Skin Protection: adhesive use limited  Intervention: Prevent and Manage VTE (Venous Thromboembolism) Risk  Flowsheets (Taken 8/5/2022 0128)  Activity Management:   Ankle pumps - L1   Ambulated to bathroom - L4  VTE  Prevention/Management: ambulation promoted  Range of Motion: active ROM (range of motion) encouraged  Intervention: Prevent Infection  Flowsheets (Taken 8/5/2022 0128)  Infection Prevention:   hand hygiene promoted   rest/sleep promoted   personal protective equipment utilized   single patient room provided  Goal: Optimal Comfort and Wellbeing  Outcome: Ongoing, Progressing  Intervention: Monitor Pain and Promote Comfort  Flowsheets (Taken 8/5/2022 0128)  Pain Management Interventions:   care clustered   pain management plan reviewed with patient/caregiver  Intervention: Provide Person-Centered Care  Flowsheets (Taken 8/5/2022 0128)  Trust Relationship/Rapport: care explained  Goal: Readiness for Transition of Care  Outcome: Ongoing, Progressing

## 2022-08-05 NOTE — DISCHARGE SUMMARY
[facility]  General Surgery  Discharge Summary      Patient Name: Chaim Johnson  MRN: [id number]  Admission Date: 7/28/2022  Hospital Length of Stay: 8 days  Discharge Date and Time:  08/05/2022 8:21 AM  Attending Physician: Tripp Mckinney DO   Discharging Provider: PITER Bhatt  Primary Care Provider: LANDON Evans    HPI:   No notes on file    Procedure(s) (LRB):  DEBRIDEMENT, LOWER EXTREMITY (Left)      Indwelling Lines/Drains at time of discharge:   Lines/Drains/Airways     Peripherally Inserted Central Catheter Line  Duration           PICC Double Lumen 08/03/22 1302 1 day          Drain  Duration                Closed/Suction Drain 07/28/22 1516 Right;Lateral RUQ Bulb 10 Fr. 7 days              Hospital Course: No notes on file   07/29/2022 lateral  left foot wound gangrenous with abscess purelent drainage expressed  malodorous  Start vanc/zosyn npo after midnight for drainage debridement    7/31: s/p debridement. Await cultures; continue antibiotics    08/01/2022 LLQ wound  7x5x3 cm per dr mckinney in OR, ostomy take down wound vac with seal, +flatus, advance to full liquids diet, francisco serous 20cc,   Left foot wound cx GNB, enterococcus, awiat final cultures, consult ID, for possible long term iv abx, ostemyelitis,     08/3 PICC placed    08/05 DC home with iv zosyn per ID recs until 9-9 f/u home wound vac, DC FRANCISCO drain F/U with PCP 1 week monitor renal function        Goals of Care Treatment Preferences:  Code Status: Full Code      Consults:   Consults (From admission, onward)        Status Ordering Provider     Inpatient consult to Social Work  Once        Provider:  (Not yet assigned)    Completed RENETTA RAPP     Inpatient consult to Social Work  Once        Provider:  (Not yet assigned)    Completed RENETTA RAPP     Inpatient consult to Social Work  Once        Provider:  (Not yet assigned)    Completed MORRO HOLLINGSWORTH     Inpatient  consult to Infectious Diseases  Once        Provider:  Nahomy Ash MD    Completed RENETTA RAPP     Inpatient consult to Social Work  Once        Provider:  (Not yet assigned)    Completed RENETTA RAPP     Inpatient consult to Internal Medicine  Once        Provider:  Ishaan Franco MD    Acknowledged TRIPP RENO          Significant Diagnostic Studies: Microbiology:     Pending Diagnostic Studies:     None        Final Active Diagnoses:    Diagnosis Date Noted POA    PRINCIPAL PROBLEM:  Aftercare following surgery [Z48.89] 07/28/2022 Not Applicable    Osteomyelitis of left foot [M86.9] 08/03/2022 Yes    Diabetic foot infection [E11.628, L08.9] 07/29/2022 Yes    Parastomal hernia [K43.5] 07/29/2022 Yes    Diabetes mellitus [E11.9] 07/28/2022 Yes    Benign essential HTN [I10] 11/12/2021 Yes    ALEX (obstructive sleep apnea) [G47.33] 11/12/2021 Yes    Acute renal failure superimposed on stage 3a chronic kidney disease [N17.9, N18.31] 11/12/2021 Yes      Problems Resolved During this Admission:      Discharged Condition: good    [place] [facility]    Follow Up:   Contact information for follow-up providers     Tripp Reno DO. Schedule an appointment as soon as possible for a visit in 2 week(s).    Specialties: General Surgery, Surgery  Why: post op f/u  Contact information:  [address] [facility] [place] [address]  [phone]             TWIN Evans Schedule an appointment as soon as possible for a visit in 1 week(s).    Specialties: Family Medicine, Emergency Medicine  Why: hospital discharge check BMP monitor renal function  Contact information:  [address]  [facility] [place] [address]  [phone]                   Contact information for after-discharge care     Durable Medical Equipment     [employer] .    Service: Durable Medical Equipment  Contact information:  [address] [place] [address]  [phone]                           Patient Instructions:      Diet diabetic     Notify your health care provider if you experience any of the following:  temperature >100.4     Notify your health care provider if you experience any of the following:  redness, tenderness, or signs of infection (pain, swelling, redness, odor or green/yellow discharge around incision site)     Remove dressing in 24 hours   Order Comments: Clean wound left foot wound with vashe wet to dry daily    LLQ abdominal wound vac negative 125 change every 72 hours     Activity as tolerated     Medications:  Reconciled Home Medications:      Medication List      START taking these medications    HYDROcodone-acetaminophen 7.5-325 mg per tablet  Commonly known as: NORCO  Take 1 tablet by mouth every 6 (six) hours as needed for Pain.        CONTINUE taking these medications    * amLODIPine 10 MG tablet  Commonly known as: NORVASC  Take 1 tablet (10 mg total) by mouth once daily. for 14 days     * amLODIPine 10 MG tablet  Commonly known as: NORVASC  Take 10 mg by mouth once daily.     apixaban 2.5 mg Tab  Commonly known as: ELIQUIS  Take 2.5 mg by mouth 2 (two) times daily.     carvediloL 25 MG tablet  Commonly known as: COREG  Take 25 mg by mouth 2 (two) times daily with meals.     gabapentin 300 MG capsule  Commonly known as: NEURONTIN  Take 300 mg by mouth 2 (two) times daily.     lisinopriL 10 MG tablet  Take 10 mg by mouth once daily.     metFORMIN 500 MG tablet  Commonly known as: GLUCOPHAGE  Take 500 mg by mouth 2 (two) times daily with meals.     TRESIBA FLEXTOUCH U-100 100 unit/mL (3 mL) insulin pen  Generic drug: insulin degludec  Inject 30 Units into the skin once daily.         * This list has 2 medication(s) that are the same as other medications prescribed for you. Read the directions carefully, and ask your doctor or other care provider to review them with you.              Time spent on the  discharge of patient: 30 minutes    Lizabeth Krause, ACNP  General Surgery  [facility]

## 2022-08-05 NOTE — PLAN OF CARE
[facility]  Discharge Final Note    Primary Care Provider: LANDON Evans    Expected Discharge Date: 8/5/2022    Final Discharge Note (most recent)     Final Note - 08/05/22 1111        Final Note    Assessment Type Final Discharge Note     Anticipated Discharge Disposition Home-Health Care Svc   [facility]    What phone number can be called within the next 1-3 days to see how you are doing after discharge? [phone]        Post-Acute Status    Post-Acute Authorization Home Health;IV Infusion     HME Status Set-up Complete/Auth obtained     Home Health Status Set-up Complete/Auth obtained     IV Infusion Status Set-up Complete/Auth obtained     Patient choice form signed by patient/caregiver List with quality metrics by geographic area provided;List from CMS Compare     Discharge Delays None known at this time                 Important Message from Medicare              Follow-up providers     Tripp Reno DO   Specialty: General Surgery, Surgery    [address] [facility] [address]   Phone: [phone]       Next Steps: Schedule an appointment as soon as possible for a visit in 2 week(s)    Instructions: post op f/u on August 22 at 9:15    LANDON Evans   Specialty: Family Medicine, Emergency Medicine   Relationship: PCP - General    [address]  [facility] [place] [address]   Phone: [phone]       Next Steps: Schedule an appointment as soon as possible for a visit in 1 week(s)    Instructions: hospital discharge check BMP monitor renal function please follow up on August 15 at 1:30          After-discharge care            Durable Medical Equipment     [facility]   Service: Durable Medical Equipment    [address] [place] [address]   Phone: [phone]             Dialysis/Infusion     [facility]   Service: Home Infusion and Injection    [address] [place] [address] [place] [address]   Phone: [phone]             Home Medical Care     [facility]   Service: Home Health Services    [address] [facility] [place] [address]   Phone: [phone]                       Pt to dc home today. MÓNICA faxed dc orders to [employer] and spoke with Lotus at [facility] and informed her of dc today. MÓNICA spoke with nurse and [facility] has delivered iv abx. Pt received dose of iv abx today at 0630. No other needs.

## 2022-08-05 NOTE — ASSESSMENT & PLAN NOTE
Continue zosyn. Was on Vanc but held due to high levels. Now cultures back, polymicrobial, all sensitive to zosyn. Will not resume vanc.  Home IV abx now approved. Home tomorrow. .

## 2022-08-05 NOTE — ASSESSMENT & PLAN NOTE
07/29/2022 lateral  left foot wound gangrenous with abscess purelent drainage expressed  malodorous  Start vanc/zosyn npo after midnight for drainage debridement    7/31: s/p debridement. Await cultures; continue antibiotics    08/01/2022 LLQ wound  7x5x3 cm per dr mckinney in OR, ostomy take down wound vac with seal, +flatus, advance to full liquids diet, francisco serous 20cc,   Left foot wound cx GNB, enterococcus, awiat final cultures, consult ID, for possible long term iv abx, ostemyelitis,     08/3 PICC placed    08/05 DC home with iv zosyn per ID recs until 9-9 f/u with PCP 1 week monitor renal function

## 2022-08-05 NOTE — NURSING
Patient's room smelled of smoke, security removed 1 pack of cigarettes and lighter from patient's bathroom. Patient reports smoking in the bathroom. Patient received education of the safety of not smoking in the hospital.

## 2022-08-05 NOTE — PROGRESS NOTES
"[facility]  Adult Nutrition  Progress Note    SUMMARY       Recommendations    Recommendation/Intervention: Recommend Renal, Diabetic diet to better meet nutritional needs. Recommend Jah BID to aid in wound healing. May also consider 500 mg Vit C BID + 220 mg ZnSO4 BID + mvi daily to aid in wound healing.  Goals: Consume % of meals, tolerate PO intake, weight maintenance, wound healing  Nutrition Goal Status: new    Assessment and Plan  Pt seen for LOS. Weight is well over IBW range and BMI considered obese per guidelines. Pt with PMH of CK3 and also with wounds per flowsheets, needs adjusted. Pt is currently receiving a Consistent Carbohydrate 1800 kcals diet. Per flowsheets, pt consuming 100% of meals. Recommend Renal, Diabetic to better meet nutritional needs. Recommend Jah BID to aid in wound healing. Consider 500 mg Vit C BID + 220 mg ZnSO4 BID + mvi daily to aid in wound healing as well. Last BM 8/4 per flowsheets. Labs/meds reviewed. Will continue to monitor. RD following.   No new Assessment & Plan notes have been filed under this hospital service since the last note was generated.  Service: Nutrition       Reason for Assessment    Reason For Assessment: length of stay  Diagnosis: other (see comments) (aftercare following surgery)  Relevant Medical History: DM, HTN, renal disorder, CKD3    Nutrition Risk Screen    Nutrition Risk Screen: no indicators present    Nutrition/Diet History    Spiritual, Cultural Beliefs, Tenriism Practices, Values that Affect Care: no    Anthropometrics    Temp: 98.3 °F (36.8 °C)  Height Method: Stated  Height: 6' 1" (185.4 cm)  Height (inches): 73 in  Weight Method: Bed Scale  Weight: 121.1 kg (266 lb 15.6 oz)  Weight (lb): 266.98 lb  Ideal Body Weight (IBW), Male: 184 lb  % Ideal Body Weight, Male (lb): 145.1 %  BMI (Calculated): 35.2       Lab/Procedures/Meds   Latest Reference Range & Units 08/05/22 03:51   Sodium 136 - 145 mmol/L 139 "   Potassium 3.5 - 5.1 mmol/L 3.4 (L)   Chloride 98 - 107 mmol/L 105   CO2 21 - 32 mmol/L 26   Anion Gap 7 - 16 mmol/L 11   BUN 7 - 18 mg/dL 20 (H)   Creatinine 0.70 - 1.30 mg/dL 2.10 (H)   BUN/CREAT RATIO 6 - 20  10   eGFR if non African American >=60 mL/min/1.73m² 36 (L)   Glucose 74 - 106 mg/dL 337 (H)   Calcium 8.5 - 10.1 mg/dL 8.7   (L): Data is abnormally low  (H): Data is abnormally high    Note: Elevated BUN, Cr and low GFR as expected given PMH of CKD3. Elevated glucose, continue to monitor. Low K, replete to WNL as needed.     Pertinent Labs Reviewed: reviewed  Pertinent Medications Reviewed: reviewed  Pertinent Medications Comments: amlodipine, carvedilol, gabapentin, heparin, insulin, pantoprazole, zosyn, dakins    Estimated/Assessed Needs    Weight Used For Calorie Calculations: 93 kg (205 lb 0.4 oz)  Energy Calorie Requirements (kcal): 7356-9389 kcals/day (25-30 kcals/kg Adj BW)     Protein Requirements: 74-93 g/day (0.8-1.0 g/kg IBW)  Weight Used For Protein Calculations: 93 kg (205 lb 0.4 oz)  Fluid Requirements (mL): 500 mL + total output or per MD     RDA Method (mL): 2325         Nutrition Prescription Ordered    Current Diet Order: Consistent Carbohydrate 1800 kcals  Nutrition Order Comments: Recommend altering diet to Renal, Diabetic to better meet nutritional needs    Evaluation of Received Nutrient/Fluid Intake       % Intake of Estimated Energy Needs: 75 - 100 %  % Meal Intake: 75 - 100 %    Nutrition Risk    Level of Risk/Frequency of Follow-up: moderate - high     Monitor and Evaluation      1. Monitor PO intake/tolerance   2. Monitor weight changes  3. Monitor labs/meds  4. Monitor wound healing   5. Monitor POC     Nutrition Follow-Up    RD Follow-up?: Yes     all other ROS negative except as per HPI

## 2022-08-05 NOTE — PROGRESS NOTES
[facility] Medicine  Progress Note    Patient Name: Chaim Johnson  MRN: [id number]  Patient Class: IP- Surgery Admit   Admission Date: 7/28/2022  Length of Stay: 7 days  Attending Physician: Tripp Palmer DO  Primary Care Provider: LANDON Evans        Subjective:     Principal Problem:Aftercare following surgery        HPI:  45 yo M underwent colostomy closure, MARIAM, appy and parasternal hernia repair today.  He is c/o pain.  He is about to have NGT placed to LIS.  He has ALEX but does not want a CPAP at this time q hs due to NGT and due to risk of N/V and aspiration this is probably a good idea.  Patient has had necrotizing fascitis of left thigh and has transmetarsal amputation of left foot with diabetic foot ulcer.  He follows in [place] for wound care with Rivas Stafford MD with [facility].  He is on full dose anticoagulation with eliquis which is being held at this time.  He does not know why he is on anticoagulation and I can find no record in hospital chart.      Patient had SMITH when he had alcoholic pancreatitis last year and follows with Dr. Flores and creat is now 1.3 and was previously 4.3.  He has been able to restart his glucophage and lisinopril.  Dr. Palmer is holding ACEI and CCB but continuing BB post op and monitoring BP closely.        Overview/Hospital Course:  07/29 Patient complains of abdominal pain. Pain pump in place. NGT in place. Accucheck 155.POD#1.  07/30 OR today for debridement of right foot ulcer. NGT in place.   07/31 Patient voices no complaints. NG removed. Wound care. Accuchek 134    08/01 Records reviewed.   OR procedure 07/28 closure colostomy, repair hernia, appendectomy  OR procedure 07/30 debridement left lower extremity          Interval History: Diarrhea improved. He feels better.     Review of Systems   Respiratory:  Negative for chest tightness and shortness of breath.    Cardiovascular:  Negative for chest  pain and palpitations.   Objective:     Vital Signs (Most Recent):  Temp: 98.3 °F (36.8 °C) (08/04/22 1909)  Pulse: 92 (08/04/22 1909)  Resp: 16 (08/04/22 1909)  BP: (!) 185/96 (08/04/22 1909)  SpO2: 96 % (08/04/22 1909)   Vital Signs (24h Range):  Temp:  [97.7 °F (36.5 °C)-98.7 °F (37.1 °C)] 98.3 °F (36.8 °C)  Pulse:  [75-99] 92  Resp:  [16-18] 16  SpO2:  [96 %-98 %] 96 %  BP: (155-188)/(89-99) 185/96     Weight: 121.1 kg (267 lb)  Body mass index is 35.23 kg/m².    Intake/Output Summary (Last 24 hours) at 8/4/2022 2028  Last data filed at 8/3/2022 2335  Gross per 24 hour   Intake 100 ml   Output 1550 ml   Net -1450 ml        Physical Exam  Vitals reviewed.   Constitutional:       General: He is not in acute distress.     Appearance: He is obese. He is not toxic-appearing.   Eyes:      General: No scleral icterus.  Cardiovascular:      Rate and Rhythm: Normal rate and regular rhythm.      Heart sounds: Normal heart sounds.   Pulmonary:      Effort: Pulmonary effort is normal. No respiratory distress.      Breath sounds: Normal breath sounds. No wheezing.   Abdominal:      General: Abdomen is flat. Bowel sounds are normal. There is no distension.      Palpations: Abdomen is soft.   Skin:     General: Skin is warm and dry.      Comments: Dressing L foot.    Neurological:      Mental Status: He is alert.       Significant Labs: All pertinent labs within the past 24 hours have been reviewed.  BMP:   Recent Labs   Lab 08/04/22 0228   *      K 3.1*   *   CO2 25   BUN 14   CREATININE 1.84*   CALCIUM 8.9   MG 1.8       CBC:   Recent Labs   Lab 08/03/22  0303 08/04/22 0228   WBC 7.37 8.42   HGB 8.9* 8.9*   HCT 28.1* 27.9*    368         Significant Imaging: I have reviewed all pertinent imaging results/findings within the past 24 hours.      Assessment/Plan:      * Aftercare following surgery  Defer to primary team.        Diabetes mellitus  Didn't get levemir this am and glucoses have been  higher . Will give one time dose tonight and he will need to resume his home Tresiba tomorrow night.     Diabetic foot infection  Continue zosyn. Was on Vanc but held due to high levels. Now cultures back, polymicrobial, all sensitive to zosyn. Will not resume vanc.  Home IV abx now approved. Home tomorrow. .     Osteomyelitis of left foot    Polymicrobial.  ID consultation for antibiotic recommendation.     Acute renal failure superimposed on stage 3a chronic kidney disease    SMITH possibly due to high vanc levels. Improving.     ALEX (obstructive sleep apnea)  Holding CPAP until NGT discontinued and no longer aspiration risk.       Benign essential HTN  Holding ACE and CCB at present.  Restart when BP tolerates  Continue BB.         VTE Risk Mitigation (From admission, onward)         Ordered     heparin (porcine) injection 5,000 Units  Every 8 hours         08/03/22 0816     IP VTE HIGH RISK PATIENT  Once         07/28/22 1754     Place sequential compression device  Until discontinued         07/28/22 1754                Discharge Planning   KASSIE: 8/5/2022     Code Status: Full Code   Is the patient medically ready for discharge?:     Reason for patient still in hospital (select all that apply): Treatment  Discharge Plan A: Home Health ([facility])   Discharge Delays: None known at this time              Rj Aceves Jr, MD  Department of Hospital Medicine   [facility]

## 2022-08-07 LAB
BACTERIA BLD CULT: NORMAL
BACTERIA BLD CULT: NORMAL

## 2022-08-23 ENCOUNTER — OFFICE VISIT (OUTPATIENT)
Dept: SURGERY | Facility: CLINIC | Age: 47
End: 2022-08-23
Payer: COMMERCIAL

## 2022-08-23 DIAGNOSIS — Z93.3 S/P COLOSTOMY: Primary | ICD-10-CM

## 2022-08-23 DIAGNOSIS — L08.9 DIABETIC FOOT INFECTION: ICD-10-CM

## 2022-08-23 DIAGNOSIS — M86.372 CHRONIC MULTIFOCAL OSTEOMYELITIS OF LEFT FOOT: ICD-10-CM

## 2022-08-23 DIAGNOSIS — E11.628 DIABETIC FOOT INFECTION: ICD-10-CM

## 2022-08-23 PROCEDURE — 1159F MED LIST DOCD IN RCRD: CPT | Mod: CPTII,,, | Performed by: STUDENT IN AN ORGANIZED HEALTH CARE EDUCATION/TRAINING PROGRAM

## 2022-08-23 PROCEDURE — 3051F PR MOST RECENT HEMOGLOBIN A1C LEVEL 7.0 - < 8.0%: ICD-10-PCS | Mod: CPTII,,, | Performed by: STUDENT IN AN ORGANIZED HEALTH CARE EDUCATION/TRAINING PROGRAM

## 2022-08-23 PROCEDURE — 99024 PR POST-OP FOLLOW-UP VISIT: ICD-10-PCS | Mod: ,,, | Performed by: STUDENT IN AN ORGANIZED HEALTH CARE EDUCATION/TRAINING PROGRAM

## 2022-08-23 PROCEDURE — 1159F PR MEDICATION LIST DOCUMENTED IN MEDICAL RECORD: ICD-10-PCS | Mod: CPTII,,, | Performed by: STUDENT IN AN ORGANIZED HEALTH CARE EDUCATION/TRAINING PROGRAM

## 2022-08-23 PROCEDURE — 3051F HG A1C>EQUAL 7.0%<8.0%: CPT | Mod: CPTII,,, | Performed by: STUDENT IN AN ORGANIZED HEALTH CARE EDUCATION/TRAINING PROGRAM

## 2022-08-23 PROCEDURE — 4010F PR ACE/ARB THEARPY RXD/TAKEN: ICD-10-PCS | Mod: CPTII,,, | Performed by: STUDENT IN AN ORGANIZED HEALTH CARE EDUCATION/TRAINING PROGRAM

## 2022-08-23 PROCEDURE — 99213 OFFICE O/P EST LOW 20 MIN: CPT | Mod: PBBFAC | Performed by: STUDENT IN AN ORGANIZED HEALTH CARE EDUCATION/TRAINING PROGRAM

## 2022-08-23 PROCEDURE — 99024 POSTOP FOLLOW-UP VISIT: CPT | Mod: ,,, | Performed by: STUDENT IN AN ORGANIZED HEALTH CARE EDUCATION/TRAINING PROGRAM

## 2022-08-23 PROCEDURE — 4010F ACE/ARB THERAPY RXD/TAKEN: CPT | Mod: CPTII,,, | Performed by: STUDENT IN AN ORGANIZED HEALTH CARE EDUCATION/TRAINING PROGRAM

## 2022-08-23 NOTE — PROGRESS NOTES
Subjective:       Patient ID: Chaim Johnson is a 46 y.o. male.    Chief Complaint: Follow-up    46-year-old  male presents to the clinic for 2 week postop evaluation status post exploratory laparotomy with lysis of adhesions, repair of parastomal hernia, appendectomy and reversal of colostomy with colorectal anastomosis, with wound VAC placement.  Patient has been doing well.  Tolerating diet and having normal bowel function.  His ostomy site has almost completely healed over and does not need wound VAC coverage.  Patient did also have some osteomyelitis of the left foot which was debrided in the hospital and patient was started on 6 weeks of IV antibiotics.  Patient states he has 3 more weeks of IV antibiotics.  Denies any chest pain/shortness of breath, nausea/vomiting/diarrhea, fever/chills.    Review of Systems   Constitutional: Negative.    HENT: Negative.    Eyes: Negative.    Respiratory: Negative for shortness of breath.    Cardiovascular: Negative.    Gastrointestinal: Negative for abdominal pain, blood in stool, change in bowel habit, vomiting and change in bowel habit.   Endocrine: Negative.    Genitourinary: Negative.  Negative for hematuria.   Musculoskeletal: Negative for back pain and myalgias.   Integumentary:  Negative.   Allergic/Immunologic: Negative.    Neurological: Negative for dizziness, weakness and light-headedness.   Psychiatric/Behavioral: Negative.          Objective:      Physical Exam  Constitutional:       General: He is not in acute distress.     Appearance: Normal appearance.   HENT:      Head: Normocephalic.   Cardiovascular:      Rate and Rhythm: Normal rate.   Pulmonary:      Effort: Pulmonary effort is normal. No respiratory distress.   Abdominal:      General: There is no distension.      Tenderness: There is no abdominal tenderness.          Comments: Midline incision well healed, no signs of infection; staples intact.  Left quadrant ostomy with very minimal pink  granulation tissue exposed, ostomy healed over and no opening present   Musculoskeletal:         General: Normal range of motion.        Feet:    Feet:      Comments: Right foot with 2 clean ulcers on the plantar ball of the foot, no necrosis or infection, with surrounding callus tissue    Wound to left lateral foot healing with granulation tissue present, no bone exposed.  Two small clean ulcers on the plantar surface of the lateral left great toe and medial left 2nd toe  Skin:     General: Skin is warm.      Coloration: Skin is not jaundiced.   Neurological:      General: No focal deficit present.      Mental Status: He is alert and oriented to person, place, and time.      Cranial Nerves: No cranial nerve deficit.         Assessment:       Problem List Items Addressed This Visit        ID    Osteomyelitis of left foot       Endocrine    Diabetic foot infection      Other Visit Diagnoses     S/P colostomy    -  Primary          Plan:       Staples removed.  Band-Aid placed over ostomy site.  Continue dressing changes to bilateral feet.  Try to air dry and keep both feet and wounds dry.      Patient to follow-up in 4 weeks after IV antibiotics were completed; we will get a CRP, sed rate and procalcitonin prior to that appointment to see if we can go ahead and stop IV therapy.

## (undated) DEVICE — SYRINGE ASEPTO IRRIGATION BULB 60CC LF DISP

## (undated) DEVICE — GLOVE SURGICAL PROTEXIS PI SIZE 6.5

## (undated) DEVICE — SCISSORS MONOPOLAR CURVED ENDOWRIST DAVINCI XI 10US/EA

## (undated) DEVICE — TUBING SUCTION 5MM STERILE 3/16IN X 12FT

## (undated) DEVICE — SYRINGE 30CC LL

## (undated) DEVICE — SPONGE LAP STRL 18X18 5/PK

## (undated) DEVICE — SLEEVE SURGICAL STERILE

## (undated) DEVICE — SPONGE GAUZE 4X4 12 PLY STL AMD 10/TRAY

## (undated) DEVICE — GLOVE SURGICAL PROTEXIS PI SIZE 7

## (undated) DEVICE — FORCEP FENESTRATED BIPOLAR ENDOWRIST DAVINCI XI 14US/EA

## (undated) DEVICE — SUTURE PDS 1 MONO VIOL TP-1 48

## (undated) DEVICE — SUTURE PROLENE 2-0 SH BLU 48 IN

## (undated) DEVICE — STAPLER SKIN REFLEX ONE 35 WIDE DISP

## (undated) DEVICE — TRAY SKIN PREP PROVIDONE IODINE STERILE LATEX FREE

## (undated) DEVICE — CUTTER LINEAR RELOAD GIA 55MM BLUE

## (undated) DEVICE — GOWN SURGICAL SMARTGOWN LEVEL 4 / EXTRA LARGE STERILE

## (undated) DEVICE — GLOVE SURGICAL PROTEXIS PI SIZE 8.0

## (undated) DEVICE — Device

## (undated) DEVICE — ADHESIVE LIQUID MASTISOL 2/3CC

## (undated) DEVICE — CDS EXTREMITY

## (undated) DEVICE — CDS LITHOTOMY

## (undated) DEVICE — SYRINGE 60CC CATHETER TIP

## (undated) DEVICE — KIT DRESSING INVIA FOAM W/FITPAD LARGE (MEDELA)

## (undated) DEVICE — DAVINCI 5 X 8 UNIVERSAL SEAL

## (undated) DEVICE — DAVINCI VESSEL SEALER ENDOWRIST

## (undated) DEVICE — DAVINICI OPTICAL 8MM BLADLESS OBTURATOR

## (undated) DEVICE — SOL IRRIGATION SALINE 0.9% 1000ML BOTTLE

## (undated) DEVICE — SUTURE SILK 2-0 FS 18 BLACK

## (undated) DEVICE — DRAPE SURGICAL EXTREMITY 86 X 126.5" REINFORCED"

## (undated) DEVICE — SUTURE VICRYL 0 UR-6 27 IN VIOLET

## (undated) DEVICE — CDS ENT

## (undated) DEVICE — PICO 2X DRESSING KIT 10CM X 30CM

## (undated) DEVICE — DAVINCI ENDOWRIST STAPLER 45MM

## (undated) DEVICE — WARMER SCOPE DISP

## (undated) DEVICE — GLOVE SURGICAL PROTEXIS PI BLUE SIZE 8.0

## (undated) DEVICE — PENCIL HANDSWITCHING ROCKER SW

## (undated) DEVICE — CURETTE DERMAL DISP 5MM

## (undated) DEVICE — KIT CATH HEMODIALYSIS GLIDEPATH 19CM STR.

## (undated) DEVICE — DRESSING ABD SURGIPAD 8X7.5IN

## (undated) DEVICE — CUTTER LINEAR GIA 55MM

## (undated) DEVICE — SUTURE PDS II 1 CT VIOLET 36IN

## (undated) DEVICE — GOWN SURGICAL STERILE LEVEL 3 / XX-LARGE

## (undated) DEVICE — COVER PROBE 6X96 IN STERILE CS/20

## (undated) DEVICE — SUTURE SILK 0-18 BLACK TIES LA

## (undated) DEVICE — DRESSING GAUZE 4X4 12 PLY 2EA PK

## (undated) DEVICE — DAVINICI ENDOWRIST STAPLER 45 BLUE RELOAD

## (undated) DEVICE — APPLICATOR CHLORAPREP HI-LITE TINTED ORANGE 26ML

## (undated) DEVICE — CDS BASIC

## (undated) DEVICE — STRIP CLOSURE SKIN 1/2 X 4 IN

## (undated) DEVICE — GLOVE SURGICAL PROTEXIS PI BLUE SIZE 7.0

## (undated) DEVICE — TUBING INVIA LIBERTY DBL LUMEN W/QUICK CONNECT (MEDELA)

## (undated) DEVICE — GLOVE SURGICAL PROTEXIS PI BLUE SIZE 6.0

## (undated) DEVICE — CULTURETTE ANAEROBIC COLLECTOR

## (undated) DEVICE — HANDLE YANKAUER SUCTION K80 LATEX FREE

## (undated) DEVICE — KIT COLOSTOMY/ILEOSTOMY MOLDABLE DRAIN 2 3/4 INCH

## (undated) DEVICE — GRASPER TIP UP FENESTRATED ENDOWRIST DAVINCI XI 10US/EA

## (undated) DEVICE — NEEDLE ECLIPSE 25GX1 1/2 IN

## (undated) DEVICE — SOL IRRIGATION SALINE 3000ML BAG

## (undated) DEVICE — GLOVE SURGICAL PROTEXIS PI BLUE SIZE 7.5

## (undated) DEVICE — BANDAGE KERLIX AMD  4.5IN  SPONGE ROLL

## (undated) DEVICE — ACCESSORY TIP COVER

## (undated) DEVICE — STAPLER REDUCER DISPOSABLE XI

## (undated) DEVICE — DAVINCI ENDOWRIST STAPLER SHEATH 45MM

## (undated) DEVICE — DRAPE SURGICAL 3/4 SHEET 52IN X 76IN STERILE

## (undated) DEVICE — GLOVE SURGICAL PROTEXIS PI SIZE 6

## (undated) DEVICE — FUSION DEVICE VOYANT OPEN

## (undated) DEVICE — SUTURE VICRYL 0 CT1 UNDYED 36"

## (undated) DEVICE — SUTURE VICRYL 3-0  SH UNDYED 18IN

## (undated) DEVICE — TOWEL OR STERILE BLUE 4/PK 20PK/CS

## (undated) DEVICE — SUTURE ETHILON 2-0 18 BLACK FS

## (undated) DEVICE — DAVINCI ENDOWRIST STAPLER CANNULA SEAL XI

## (undated) DEVICE — LEGGINGS SURGICAL 6IN STERILE

## (undated) DEVICE — BLADE INSULATED COATED 6IN

## (undated) DEVICE — DAVINCI ARM DRAPE

## (undated) DEVICE — BINDER ABDOMINAL 12 IN X 4 PANEL 63-74 IN L/XL

## (undated) DEVICE — BLADE CARBON SURG SS SZ 15 STERILE

## (undated) DEVICE — DRAPE FLUORO C ARM 36X30IN

## (undated) DEVICE — CANISTER INVIA LIBERTY 800ML W/SOLIDIFIER (MEDELA)

## (undated) DEVICE — SYRINGE 10-12CC LURE -LOK TIP

## (undated) DEVICE — SUCTION YANKER STRAIGHT TIP STERILE

## (undated) DEVICE — CDS LAP CHOLE

## (undated) DEVICE — CATHETER FOLEY 16FR SILVER 5CC

## (undated) DEVICE — BLADE BOVIE INSULATED COATED 2.75IN

## (undated) DEVICE — SUTURE VICRYL 3-0 SH UNDYED 27IN

## (undated) DEVICE — BAG-A-JET FLUID DISPENSER SYSTEM

## (undated) DEVICE — WIPE ANTI FOG DEFOGGER

## (undated) DEVICE — GLOVE SURGICAL PROTEXIS PI SIZE 7.5

## (undated) DEVICE — DRAPE SURGICAL 3/4 SHEET 56 X 77" STERILE"

## (undated) DEVICE — DRAIN SURGICAL J/P FLAT HUBLESS 10MM X 20CM

## (undated) DEVICE — TRAY FOLEY CATH 16FR SILICONE LUBRI-SIL W/DRAINAGE BAG

## (undated) DEVICE — NEEDLE ECLIPSE 22GX1 1/2 IN SAFETY

## (undated) DEVICE — CULTURETTE AEROBIC SWAB

## (undated) DEVICE — DRESSING TELFA MINI ISLAND LF NONADH

## (undated) DEVICE — SUTURE MONOCRYL 4-0 27IN

## (undated) DEVICE — SPONGE XRAY DETECT 4X4IN PK/10

## (undated) DEVICE — BULB RESERVOIR J/P 100CC

## (undated) DEVICE — IRRIGATOR SUCTION STYKERFLOW II DISP

## (undated) DEVICE — SUTURE MONOCRYL 4-0 18 PS-2

## (undated) DEVICE — GLOVE SURGICAL PROTEXIS PI BLUE SIZE 6.5